# Patient Record
Sex: FEMALE | Race: WHITE | NOT HISPANIC OR LATINO
[De-identification: names, ages, dates, MRNs, and addresses within clinical notes are randomized per-mention and may not be internally consistent; named-entity substitution may affect disease eponyms.]

---

## 2018-06-22 ENCOUNTER — APPOINTMENT (OUTPATIENT)
Dept: ANESTHESIOLOGY | Facility: CLINIC | Age: 68
End: 2018-06-22

## 2018-06-22 ENCOUNTER — OUTPATIENT (OUTPATIENT)
Dept: OUTPATIENT SERVICES | Facility: HOSPITAL | Age: 68
LOS: 1 days | End: 2018-06-22
Payer: MEDICARE

## 2018-06-22 DIAGNOSIS — M54.16 RADICULOPATHY, LUMBAR REGION: ICD-10-CM

## 2018-06-22 PROCEDURE — 64483 NJX AA&/STRD TFRM EPI L/S 1: CPT

## 2018-07-13 ENCOUNTER — OUTPATIENT (OUTPATIENT)
Dept: OUTPATIENT SERVICES | Facility: HOSPITAL | Age: 68
LOS: 1 days | End: 2018-07-13
Payer: MEDICARE

## 2018-07-13 ENCOUNTER — APPOINTMENT (OUTPATIENT)
Dept: ANESTHESIOLOGY | Facility: CLINIC | Age: 68
End: 2018-07-13

## 2018-07-13 ENCOUNTER — TRANSCRIPTION ENCOUNTER (OUTPATIENT)
Age: 68
End: 2018-07-13

## 2018-07-13 DIAGNOSIS — M54.16 RADICULOPATHY, LUMBAR REGION: ICD-10-CM

## 2018-07-13 PROCEDURE — 62323 NJX INTERLAMINAR LMBR/SAC: CPT

## 2020-12-05 ENCOUNTER — TRANSCRIPTION ENCOUNTER (OUTPATIENT)
Age: 70
End: 2020-12-05

## 2020-12-17 ENCOUNTER — TRANSCRIPTION ENCOUNTER (OUTPATIENT)
Age: 70
End: 2020-12-17

## 2020-12-21 ENCOUNTER — TRANSCRIPTION ENCOUNTER (OUTPATIENT)
Age: 70
End: 2020-12-21

## 2021-01-19 DIAGNOSIS — Z80.3 FAMILY HISTORY OF MALIGNANT NEOPLASM OF BREAST: ICD-10-CM

## 2021-01-19 DIAGNOSIS — E55.9 VITAMIN D DEFICIENCY, UNSPECIFIED: ICD-10-CM

## 2021-01-20 ENCOUNTER — NON-APPOINTMENT (OUTPATIENT)
Age: 71
End: 2021-01-20

## 2021-01-21 ENCOUNTER — APPOINTMENT (OUTPATIENT)
Dept: INTERNAL MEDICINE | Facility: CLINIC | Age: 71
End: 2021-01-21
Payer: MEDICARE

## 2021-01-21 ENCOUNTER — NON-APPOINTMENT (OUTPATIENT)
Age: 71
End: 2021-01-21

## 2021-01-21 VITALS — DIASTOLIC BLOOD PRESSURE: 40 MMHG | SYSTOLIC BLOOD PRESSURE: 82 MMHG

## 2021-01-21 VITALS
BODY MASS INDEX: 22.2 KG/M2 | TEMPERATURE: 96.6 F | HEIGHT: 64 IN | HEART RATE: 77 BPM | WEIGHT: 130 LBS | OXYGEN SATURATION: 96 % | DIASTOLIC BLOOD PRESSURE: 64 MMHG | SYSTOLIC BLOOD PRESSURE: 112 MMHG

## 2021-01-21 PROCEDURE — 93000 ELECTROCARDIOGRAM COMPLETE: CPT

## 2021-01-21 PROCEDURE — G0439: CPT

## 2021-01-21 PROCEDURE — 36415 COLL VENOUS BLD VENIPUNCTURE: CPT

## 2021-01-21 RX ORDER — ROPINIROLE 0.25 MG/1
0.25 TABLET, FILM COATED ORAL
Refills: 0 | Status: ACTIVE | COMMUNITY

## 2021-01-21 NOTE — PHYSICAL EXAM
[No Acute Distress] : no acute distress [Well Nourished] : well nourished [Well Developed] : well developed [Well-Appearing] : well-appearing [Normal Sclera/Conjunctiva] : normal sclera/conjunctiva [PERRL] : pupils equal round and reactive to light [EOMI] : extraocular movements intact [Normal Outer Ear/Nose] : the outer ears and nose were normal in appearance [Normal Oropharynx] : the oropharynx was normal [No JVD] : no jugular venous distention [No Lymphadenopathy] : no lymphadenopathy [Supple] : supple [Thyroid Normal, No Nodules] : the thyroid was normal and there were no nodules present [No Respiratory Distress] : no respiratory distress  [No Accessory Muscle Use] : no accessory muscle use [Clear to Auscultation] : lungs were clear to auscultation bilaterally [Normal Rate] : normal rate  [Regular Rhythm] : with a regular rhythm [Normal S1, S2] : normal S1 and S2 [No Murmur] : no murmur heard [No Carotid Bruits] : no carotid bruits [No Varicosities] : no varicosities [No Abdominal Bruit] : a ~M bruit was not heard ~T in the abdomen [Pedal Pulses Present] : the pedal pulses are present [No Edema] : there was no peripheral edema [No Palpable Aorta] : no palpable aorta [No Extremity Clubbing/Cyanosis] : no extremity clubbing/cyanosis [Normal Appearance] : normal in appearance [No Nipple Discharge] : no nipple discharge [No Axillary Lymphadenopathy] : no axillary lymphadenopathy [Soft] : abdomen soft [Non Tender] : non-tender [Non-distended] : non-distended [No Masses] : no abdominal mass palpated [No HSM] : no HSM [Normal Bowel Sounds] : normal bowel sounds [No Stool to Guaiac] : no stool to guaiac [No Mass] : no mass [Normal Sphincter Tone] : normal sphincter tone [Normal Posterior Cervical Nodes] : no posterior cervical lymphadenopathy [Normal Anterior Cervical Nodes] : no anterior cervical lymphadenopathy [No CVA Tenderness] : no CVA  tenderness [No Spinal Tenderness] : no spinal tenderness [No Joint Swelling] : no joint swelling [Grossly Normal Strength/Tone] : grossly normal strength/tone [No Rash] : no rash [Coordination Grossly Intact] : coordination grossly intact [No Focal Deficits] : no focal deficits [Normal Gait] : normal gait [Deep Tendon Reflexes (DTR)] : deep tendon reflexes were 2+ and symmetric [Normal Affect] : the affect was normal [Normal Insight/Judgement] : insight and judgment were intact [Stool Occult Blood] : stool negative for occult blood

## 2021-01-21 NOTE — ASSESSMENT
[FreeTextEntry1] : hold FLU and shingrx in hopes of immunization for Covid 19\par All preventative measures were reviewed with the patient and the patient is due for and agrees to the following as outlined  in the plan  below.\par  75.12

## 2021-01-21 NOTE — HEALTH RISK ASSESSMENT
[No] : In the past 12 months have you used drugs other than those required for medical reasons? No [0] : 2) Feeling down, depressed, or hopeless: Not at all (0) [Smoke Detector] : smoke detector [Carbon Monoxide Detector] : carbon monoxide detector [Seat Belt] :  uses seat belt [Sunscreen] : uses sunscreen [30 or more] : 30 or more [No falls in past year] : Patient reported no falls in the past year [None] : None [With Significant Other] : lives with significant other [] :  [Fully functional (bathing, dressing, toileting, transferring, walking, feeding)] : Fully functional (bathing, dressing, toileting, transferring, walking, feeding) [Fully functional (using the telephone, shopping, preparing meals, housekeeping, doing laundry, using] : Fully functional and needs no help or supervision to perform IADLs (using the telephone, shopping, preparing meals, housekeeping, doing laundry, using transportation, managing medications and managing finances) [Reports changes in hearing] : Reports changes in hearing [With Patient/Caregiver] : With Patient/Caregiver [] : No [YearQuit] : 1990 [de-identified] : not regular [de-identified] : reg [GQX5Ovnaq] : 0 [EyeExamDate] : 03/2020 [Change in mental status noted] : No change in mental status noted [Guns at Home] : no guns at home [MammogramDate] : 07/2020 [PapSmearDate] : 0 [BoneDensityDate] : 07/2020 [AdvancecareDate] : 1/21/21

## 2021-01-25 ENCOUNTER — TRANSCRIPTION ENCOUNTER (OUTPATIENT)
Age: 71
End: 2021-01-25

## 2021-01-25 LAB
25(OH)D3 SERPL-MCNC: 34.1 NG/ML
ALBUMIN SERPL ELPH-MCNC: 4.2 G/DL
ALP BLD-CCNC: 153 U/L
ALT SERPL-CCNC: 14 U/L
ANION GAP SERPL CALC-SCNC: 15 MMOL/L
APPEARANCE: CLEAR
AST SERPL-CCNC: 18 U/L
BACTERIA: NEGATIVE
BASOPHILS # BLD AUTO: 0.05 K/UL
BASOPHILS NFR BLD AUTO: 1.1 %
BILIRUB SERPL-MCNC: 0.4 MG/DL
BILIRUBIN URINE: NEGATIVE
BLOOD URINE: NEGATIVE
BUN SERPL-MCNC: 15 MG/DL
CALCIUM SERPL-MCNC: 9.6 MG/DL
CHLORIDE SERPL-SCNC: 105 MMOL/L
CHOLEST SERPL-MCNC: 185 MG/DL
CO2 SERPL-SCNC: 23 MMOL/L
COLOR: YELLOW
CREAT SERPL-MCNC: 0.95 MG/DL
EOSINOPHIL # BLD AUTO: 0.26 K/UL
EOSINOPHIL NFR BLD AUTO: 5.5 %
GLUCOSE QUALITATIVE U: NEGATIVE
GLUCOSE SERPL-MCNC: 89 MG/DL
HCT VFR BLD CALC: 42.7 %
HDLC SERPL-MCNC: 45 MG/DL
HGB BLD-MCNC: 13.7 G/DL
HYALINE CASTS: 0 /LPF
IMM GRANULOCYTES NFR BLD AUTO: 0.2 %
KETONES URINE: NEGATIVE
LDLC SERPL CALC-MCNC: 123 MG/DL
LEUKOCYTE ESTERASE URINE: NEGATIVE
LYMPHOCYTES # BLD AUTO: 1.95 K/UL
LYMPHOCYTES NFR BLD AUTO: 41.2 %
MAN DIFF?: NORMAL
MCHC RBC-ENTMCNC: 30.9 PG
MCHC RBC-ENTMCNC: 32.1 GM/DL
MCV RBC AUTO: 96.4 FL
MICROSCOPIC-UA: NORMAL
MONOCYTES # BLD AUTO: 0.55 K/UL
MONOCYTES NFR BLD AUTO: 11.6 %
NEUTROPHILS # BLD AUTO: 1.91 K/UL
NEUTROPHILS NFR BLD AUTO: 40.4 %
NITRITE URINE: NEGATIVE
NONHDLC SERPL-MCNC: 140 MG/DL
PH URINE: 6
PLATELET # BLD AUTO: 250 K/UL
POTASSIUM SERPL-SCNC: 5.5 MMOL/L
PROT SERPL-MCNC: 6.4 G/DL
PROTEIN URINE: NEGATIVE
RBC # BLD: 4.43 M/UL
RBC # FLD: 13.4 %
RED BLOOD CELLS URINE: 2 /HPF
SODIUM SERPL-SCNC: 143 MMOL/L
SPECIFIC GRAVITY URINE: 1.02
SQUAMOUS EPITHELIAL CELLS: 0 /HPF
T4 SERPL-MCNC: 5.3 UG/DL
TRIGL SERPL-MCNC: 83 MG/DL
TSH SERPL-ACNC: 3.39 UIU/ML
UROBILINOGEN URINE: NORMAL
WBC # FLD AUTO: 4.73 K/UL
WHITE BLOOD CELLS URINE: 1 /HPF

## 2021-03-31 ENCOUNTER — RX RENEWAL (OUTPATIENT)
Age: 71
End: 2021-03-31

## 2021-04-12 ENCOUNTER — TRANSCRIPTION ENCOUNTER (OUTPATIENT)
Age: 71
End: 2021-04-12

## 2021-04-13 ENCOUNTER — TRANSCRIPTION ENCOUNTER (OUTPATIENT)
Age: 71
End: 2021-04-13

## 2021-07-13 ENCOUNTER — TRANSCRIPTION ENCOUNTER (OUTPATIENT)
Age: 71
End: 2021-07-13

## 2021-08-12 ENCOUNTER — NON-APPOINTMENT (OUTPATIENT)
Age: 71
End: 2021-08-12

## 2021-08-12 ENCOUNTER — TRANSCRIPTION ENCOUNTER (OUTPATIENT)
Age: 71
End: 2021-08-12

## 2021-10-07 ENCOUNTER — RX RENEWAL (OUTPATIENT)
Age: 71
End: 2021-10-07

## 2021-10-26 ENCOUNTER — TRANSCRIPTION ENCOUNTER (OUTPATIENT)
Age: 71
End: 2021-10-26

## 2021-10-28 ENCOUNTER — TRANSCRIPTION ENCOUNTER (OUTPATIENT)
Age: 71
End: 2021-10-28

## 2021-12-01 ENCOUNTER — TRANSCRIPTION ENCOUNTER (OUTPATIENT)
Age: 71
End: 2021-12-01

## 2021-12-09 ENCOUNTER — APPOINTMENT (OUTPATIENT)
Dept: PULMONOLOGY | Facility: CLINIC | Age: 71
End: 2021-12-09
Payer: MEDICARE

## 2021-12-09 ENCOUNTER — APPOINTMENT (OUTPATIENT)
Dept: INTERNAL MEDICINE | Facility: CLINIC | Age: 71
End: 2021-12-09
Payer: MEDICARE

## 2021-12-09 VITALS
DIASTOLIC BLOOD PRESSURE: 62 MMHG | BODY MASS INDEX: 21.34 KG/M2 | SYSTOLIC BLOOD PRESSURE: 104 MMHG | OXYGEN SATURATION: 95 % | HEART RATE: 94 BPM | RESPIRATION RATE: 18 BRPM | HEIGHT: 64 IN | WEIGHT: 125 LBS | TEMPERATURE: 98 F

## 2021-12-09 DIAGNOSIS — Z80.1 FAMILY HISTORY OF MALIGNANT NEOPLASM OF TRACHEA, BRONCHUS AND LUNG: ICD-10-CM

## 2021-12-09 LAB — SARS-COV-2 N GENE NPH QL NAA+PROBE: NOT DETECTED

## 2021-12-09 PROCEDURE — 94727 GAS DIL/WSHOT DETER LNG VOL: CPT

## 2021-12-09 PROCEDURE — 99204 OFFICE O/P NEW MOD 45 MIN: CPT | Mod: 25

## 2021-12-09 PROCEDURE — ZZZZZ: CPT

## 2021-12-09 PROCEDURE — 94729 DIFFUSING CAPACITY: CPT

## 2021-12-09 PROCEDURE — 94010 BREATHING CAPACITY TEST: CPT

## 2021-12-09 RX ORDER — ALBUTEROL SULFATE 90 UG/1
108 (90 BASE) INHALANT RESPIRATORY (INHALATION)
Qty: 2 | Refills: 3 | Status: ACTIVE | COMMUNITY
Start: 2021-12-09 | End: 1900-01-01

## 2021-12-10 NOTE — ASSESSMENT
[FreeTextEntry1] : 71 year-old woman presents for shortness of breath.\par --She has severe emphysema and obstructive lung defect c/w COPD.\par \par Recommendations:\par --Start Anoro Ellipta Daily \par --Albuterol as needed\par --Advised to get influenza vaccination\par --Will refer to cardiology to insure no underlying CAD\par --She will likely need repeat CT chest - I discussed with radiology - she has compressive atelectasis in RML which is slightly larger than previos scans. We can consider repeat CT chest at reasonable time 3-6 months to insure no growth. \par \par RTC in 2 months

## 2021-12-10 NOTE — PROCEDURE
[FreeTextEntry1] : CT Chest NON Contrast\par 10/26/2021\par \par Impression:\par 1. Marked emphysema\par 2. Linear parenchymal markings in the medial segment of the RML\par 3. Scattered areas of linear scarring appear unchanged\par \par

## 2021-12-10 NOTE — HISTORY OF PRESENT ILLNESS
[TextBox_4] : 71 year-old woman presents for shorntess of breath.\par \par She describes intermittent shortness of breath, mostly with exertion. \par This has been present for a few years. \par She believes it is slowly getting worse. \par She has no associated symptoms, no chest tightness, no cough or wheezing. \par She has no nocturnal symptoms. \par No preceding events \par No relieving symptoms. \par No previous history of lung disorders. \par \par Former smoker\par quit >20 years\par Started age 16, 1.5ppd \par \par

## 2022-01-05 ENCOUNTER — RX RENEWAL (OUTPATIENT)
Age: 72
End: 2022-01-05

## 2022-01-17 ENCOUNTER — TRANSCRIPTION ENCOUNTER (OUTPATIENT)
Age: 72
End: 2022-01-17

## 2022-01-26 ENCOUNTER — NON-APPOINTMENT (OUTPATIENT)
Age: 72
End: 2022-01-26

## 2022-01-26 ENCOUNTER — APPOINTMENT (OUTPATIENT)
Dept: INTERNAL MEDICINE | Facility: CLINIC | Age: 72
End: 2022-01-26
Payer: MEDICARE

## 2022-01-26 VITALS
DIASTOLIC BLOOD PRESSURE: 56 MMHG | SYSTOLIC BLOOD PRESSURE: 100 MMHG | WEIGHT: 131 LBS | HEIGHT: 64 IN | BODY MASS INDEX: 22.36 KG/M2 | OXYGEN SATURATION: 97 % | TEMPERATURE: 98.6 F | HEART RATE: 77 BPM

## 2022-01-26 VITALS — DIASTOLIC BLOOD PRESSURE: 50 MMHG | SYSTOLIC BLOOD PRESSURE: 90 MMHG

## 2022-01-26 PROCEDURE — 36415 COLL VENOUS BLD VENIPUNCTURE: CPT

## 2022-01-26 PROCEDURE — 93000 ELECTROCARDIOGRAM COMPLETE: CPT

## 2022-01-26 PROCEDURE — G0439: CPT

## 2022-01-26 NOTE — HEALTH RISK ASSESSMENT
[Former] : Former [20 or more] : 20 or more [No] : In the past 12 months have you used drugs other than those required for medical reasons? No [No falls in past year] : Patient reported no falls in the past year [0] : 2) Feeling down, depressed, or hopeless: Not at all (0) [PHQ-2 Negative - No further assessment needed] : PHQ-2 Negative - No further assessment needed [I have developed a follow-up plan documented below in the note.] : I have developed a follow-up plan documented below in the note. [Fully functional (bathing, dressing, toileting, transferring, walking, feeding)] : Fully functional (bathing, dressing, toileting, transferring, walking, feeding) [Fully functional (using the telephone, shopping, preparing meals, housekeeping, doing laundry, using] : Fully functional and needs no help or supervision to perform IADLs (using the telephone, shopping, preparing meals, housekeeping, doing laundry, using transportation, managing medications and managing finances) [Smoke Detector] : smoke detector [Carbon Monoxide Detector] : carbon monoxide detector [Seat Belt] :  uses seat belt [Sunscreen] : uses sunscreen [With Patient/Caregiver] : , with patient/caregiver [de-identified] : no [de-identified] : no [[id number]] : 0 [EyeExamDate] : 2021 [MammogramDate] : 07/2021 [PapSmearComments] : Total Hysterectomy  [BoneDensityDate] : 07/2020 [ColonoscopyDate] : 03/2006 [AdvancecareDate] : 1/26/22

## 2022-01-27 ENCOUNTER — TRANSCRIPTION ENCOUNTER (OUTPATIENT)
Age: 72
End: 2022-01-27

## 2022-01-27 LAB
25(OH)D3 SERPL-MCNC: 42.7 NG/ML
ALBUMIN SERPL ELPH-MCNC: 4.4 G/DL
ALP BLD-CCNC: 78 U/L
ALT SERPL-CCNC: 18 U/L
ANION GAP SERPL CALC-SCNC: 9 MMOL/L
APPEARANCE: CLEAR
AST SERPL-CCNC: 22 U/L
BACTERIA: NEGATIVE
BASOPHILS # BLD AUTO: 0.05 K/UL
BASOPHILS NFR BLD AUTO: 0.9 %
BILIRUB SERPL-MCNC: 0.5 MG/DL
BILIRUBIN URINE: NEGATIVE
BLOOD URINE: NEGATIVE
BUN SERPL-MCNC: 12 MG/DL
CALCIUM SERPL-MCNC: 9.6 MG/DL
CHLORIDE SERPL-SCNC: 106 MMOL/L
CHOLEST SERPL-MCNC: 147 MG/DL
CO2 SERPL-SCNC: 27 MMOL/L
COLOR: NORMAL
CREAT SERPL-MCNC: 0.77 MG/DL
EOSINOPHIL # BLD AUTO: 0.21 K/UL
EOSINOPHIL NFR BLD AUTO: 3.7 %
GLUCOSE QUALITATIVE U: NEGATIVE
GLUCOSE SERPL-MCNC: 97 MG/DL
HCT VFR BLD CALC: 43.1 %
HCV AB SER QL: NONREACTIVE
HCV S/CO RATIO: 0.08 S/CO
HDLC SERPL-MCNC: 46 MG/DL
HGB BLD-MCNC: 13.6 G/DL
HYALINE CASTS: 0 /LPF
IMM GRANULOCYTES NFR BLD AUTO: 0.4 %
KETONES URINE: NEGATIVE
LDLC SERPL CALC-MCNC: 82 MG/DL
LEUKOCYTE ESTERASE URINE: NEGATIVE
LYMPHOCYTES # BLD AUTO: 1.84 K/UL
LYMPHOCYTES NFR BLD AUTO: 32.3 %
MAN DIFF?: NORMAL
MCHC RBC-ENTMCNC: 30.9 PG
MCHC RBC-ENTMCNC: 31.6 GM/DL
MCV RBC AUTO: 98 FL
MICROSCOPIC-UA: NORMAL
MONOCYTES # BLD AUTO: 0.63 K/UL
MONOCYTES NFR BLD AUTO: 11.1 %
NEUTROPHILS # BLD AUTO: 2.94 K/UL
NEUTROPHILS NFR BLD AUTO: 51.6 %
NITRITE URINE: NEGATIVE
NONHDLC SERPL-MCNC: 100 MG/DL
PH URINE: 6
PLATELET # BLD AUTO: 282 K/UL
POTASSIUM SERPL-SCNC: 5.2 MMOL/L
PROT SERPL-MCNC: 6.1 G/DL
PROTEIN URINE: NEGATIVE
RBC # BLD: 4.4 M/UL
RBC # FLD: 14.3 %
RED BLOOD CELLS URINE: 2 /HPF
SODIUM SERPL-SCNC: 142 MMOL/L
SPECIFIC GRAVITY URINE: 1.01
SQUAMOUS EPITHELIAL CELLS: 0 /HPF
T4 SERPL-MCNC: 5.6 UG/DL
TRIGL SERPL-MCNC: 95 MG/DL
TSH SERPL-ACNC: 3.14 UIU/ML
UROBILINOGEN URINE: NORMAL
WBC # FLD AUTO: 5.69 K/UL
WHITE BLOOD CELLS URINE: 0 /HPF

## 2022-01-28 ENCOUNTER — RX RENEWAL (OUTPATIENT)
Age: 72
End: 2022-01-28

## 2022-02-09 ENCOUNTER — APPOINTMENT (OUTPATIENT)
Dept: PULMONOLOGY | Facility: CLINIC | Age: 72
End: 2022-02-09
Payer: MEDICARE

## 2022-02-09 VITALS
BODY MASS INDEX: 21.85 KG/M2 | WEIGHT: 128 LBS | DIASTOLIC BLOOD PRESSURE: 64 MMHG | RESPIRATION RATE: 16 BRPM | SYSTOLIC BLOOD PRESSURE: 98 MMHG | HEIGHT: 64 IN | HEART RATE: 78 BPM | TEMPERATURE: 97.9 F | OXYGEN SATURATION: 95 %

## 2022-02-09 PROCEDURE — 99214 OFFICE O/P EST MOD 30 MIN: CPT

## 2022-02-09 NOTE — ASSESSMENT
[FreeTextEntry1] : 71 year-old woman presents for shortness of breath.\par --She has severe emphysema and obstructive lung defect c/w COPD.\par \par Recommendations:\par --c/w Anoro Ellipta Daily . Given 90 day supply. \par --Albuterol as needed - has not used. \par --Obtain repeat CT chest. At her last visit I discussed with radiology - she has compressive atelectasis in RML which is slightly larger than previous scans. Recommended repeat CT chest at  3-6 months to insure no growth. \par RTC in 3 weeks to review CT chest

## 2022-02-09 NOTE — HISTORY OF PRESENT ILLNESS
[TextBox_4] : 71 year-old woman presents for shortness of breath.\par \par \par Former smoker\par quit >20 years\par Started age 16, 1.5ppd \par \par At her last visit 12/9/2021 she was started on Anoro Ellipta for COPD. \par Presents today for follow up. \par \par She describes intermittent shortness of breath, mostly with exertion. \par This has been present for a few years. \par She believes it is slowly getting worse. \par Since starting Anoro Ellipta she has some mild improvement. \par She has no associated symptoms, no chest tightness, no cough or wheezing. \par She has no nocturnal symptoms. \par Has chronic back pain - follows with a pain specialist. \par \par \par

## 2022-03-02 ENCOUNTER — APPOINTMENT (OUTPATIENT)
Dept: PULMONOLOGY | Facility: CLINIC | Age: 72
End: 2022-03-02
Payer: MEDICARE

## 2022-03-02 VITALS
OXYGEN SATURATION: 95 % | RESPIRATION RATE: 16 BRPM | HEART RATE: 75 BPM | DIASTOLIC BLOOD PRESSURE: 54 MMHG | SYSTOLIC BLOOD PRESSURE: 96 MMHG | WEIGHT: 128 LBS | BODY MASS INDEX: 21.85 KG/M2 | TEMPERATURE: 97.8 F | HEIGHT: 64 IN

## 2022-03-02 PROCEDURE — 99213 OFFICE O/P EST LOW 20 MIN: CPT

## 2022-03-03 ENCOUNTER — TRANSCRIPTION ENCOUNTER (OUTPATIENT)
Age: 72
End: 2022-03-03

## 2022-03-03 NOTE — HISTORY OF PRESENT ILLNESS
[TextBox_4] : 71 year-old woman presents for follow up of shortness of breath.\par \par Former smoker\par quit >20 years\par Started age 16, 1.5ppd \par \par At her initial visit 12/9/2021 she was started on Anoro Ellipta for COPD. She has no felt significant improvement in her breathing  using this medication. She still has occasional shortness of breath. . \par No cough or wheezing. \par She has no nocturnal symptoms. \par Has chronic back pain - follows with a pain specialist. \par \par At her last visit, CT chest report done at [facility] was reviewed which noted a linear parenchymal marking in the medial segment of the RML, slightly greater in extendt compared to prior study. She has since underwent repeat CT chest at [facility] 2/16/22. Repeat CT chest showed stable mild scarring atelectasis in RML. \par \par \par

## 2022-03-03 NOTE — ASSESSMENT
[FreeTextEntry1] : 71 year-old woman presents for shortness of breath.\par --She has severe emphysema and moderate obstructive lung defect on PFT c/w COPD. Has not had significant relief by inhaler regimen with Anoro Ellipta.She has albuterol which she has not used. \par --At her last visit, CT chest report done at [facility] 10/2021 was reviewed which noted a linear parenchymal marking in the medial segment of the RML, slightly greater in extent compared to prior study. She was therefor sent for repeat CT chest which she had done 2/16/22. Report and imaging reviewed, she has stable scarring in RML, this is very mild, and has been present since 2019. \par \par \par Recommendations:\par --Can stop Anoro Ellipta and monitor symptoms off therapy. \par --Use albuterol as needed - both when she has symptoms and prophylactically\par --Regarding RML scarring is mild, and present since 2019 no additional follow up for this lesion is needed.\par --Should continue to follow with PMD. Recommended cardiac eval if dyspnea persists or worsenings. She would like ot hold off for now and continue to follow with PMD.\par --She would like to follow with pulmonologist - will refer to Dr. Mata in my absence.

## 2022-04-08 ENCOUNTER — NON-APPOINTMENT (OUTPATIENT)
Age: 72
End: 2022-04-08

## 2022-04-08 ENCOUNTER — TRANSCRIPTION ENCOUNTER (OUTPATIENT)
Age: 72
End: 2022-04-08

## 2022-05-27 ENCOUNTER — TRANSCRIPTION ENCOUNTER (OUTPATIENT)
Age: 72
End: 2022-05-27

## 2022-06-06 ENCOUNTER — TRANSCRIPTION ENCOUNTER (OUTPATIENT)
Age: 72
End: 2022-06-06

## 2022-06-06 ENCOUNTER — RX RENEWAL (OUTPATIENT)
Age: 72
End: 2022-06-06

## 2022-07-07 ENCOUNTER — NON-APPOINTMENT (OUTPATIENT)
Age: 72
End: 2022-07-07

## 2022-07-07 ENCOUNTER — APPOINTMENT (OUTPATIENT)
Dept: INTERNAL MEDICINE | Facility: CLINIC | Age: 72
End: 2022-07-07

## 2022-07-07 VITALS
RESPIRATION RATE: 16 BRPM | WEIGHT: 129 LBS | DIASTOLIC BLOOD PRESSURE: 62 MMHG | HEART RATE: 78 BPM | BODY MASS INDEX: 22.02 KG/M2 | TEMPERATURE: 98.7 F | OXYGEN SATURATION: 96 % | HEIGHT: 64 IN | SYSTOLIC BLOOD PRESSURE: 108 MMHG

## 2022-07-07 PROCEDURE — 99214 OFFICE O/P EST MOD 30 MIN: CPT | Mod: 25

## 2022-07-07 PROCEDURE — 94060 EVALUATION OF WHEEZING: CPT

## 2022-07-07 RX ORDER — GABAPENTIN 300 MG/1
300 CAPSULE ORAL
Qty: 270 | Refills: 0 | Status: ACTIVE | COMMUNITY
Start: 2022-06-06

## 2022-07-07 RX ORDER — TRAMADOL HYDROCHLORIDE 50 MG/1
50 TABLET, COATED ORAL
Qty: 90 | Refills: 0 | Status: ACTIVE | COMMUNITY
Start: 2022-03-28

## 2022-07-07 NOTE — DISCUSSION/SUMMARY
[FreeTextEntry1] : Ms. Pugh is a 72-year-old female who presents for a pulmonary followup. She has moderate COPD as indicated by previous pulmonary function tests and current spirometry. She will restart Anoro Ellipta 62.5-25 one puff daily. Previous CT scan of the chest showed stable linear atelectasis. Patient has albuterol metered-dose inhaler for rescue therapy. She will not require oral steroids. Followup in 6 months.

## 2022-07-07 NOTE — HISTORY OF PRESENT ILLNESS
[TextBox_4] : Ms. Pugh presents for followup evaluation. She has history of COPD. She previously been under the care of Dr. Cohn. Patient had been started on Anoro Ellipta for her COPD, but, she discontinued it because she felt no significant improvement in her breathing. Patient does get short of breath with exertion. Activity such as going uphill, going up stairs or carrying heavy objects will close and shortness of breath. The symptoms do resolve with rest. She has no nocturnal symptoms of cough or dyspnea.

## 2022-07-07 NOTE — PROCEDURE
[FreeTextEntry1] : Spirometry pre-and postbronchodilator therapy shows moderate obstructive lung disease. FEV1 is 1.27 L which is 57% predicted. FVC is 2.44 L which is 83% predicted. FEV1/FVC ratio is 52%. There is significant bronchodilator response.

## 2022-08-23 ENCOUNTER — TRANSCRIPTION ENCOUNTER (OUTPATIENT)
Age: 72
End: 2022-08-23

## 2022-09-07 ENCOUNTER — RX RENEWAL (OUTPATIENT)
Age: 72
End: 2022-09-07

## 2022-09-29 ENCOUNTER — TRANSCRIPTION ENCOUNTER (OUTPATIENT)
Age: 72
End: 2022-09-29

## 2022-10-02 ENCOUNTER — RX RENEWAL (OUTPATIENT)
Age: 72
End: 2022-10-02

## 2022-10-26 ENCOUNTER — APPOINTMENT (OUTPATIENT)
Dept: INTERNAL MEDICINE | Facility: CLINIC | Age: 72
End: 2022-10-26

## 2022-10-26 VITALS
DIASTOLIC BLOOD PRESSURE: 62 MMHG | HEIGHT: 64 IN | TEMPERATURE: 98.1 F | OXYGEN SATURATION: 96 % | WEIGHT: 129 LBS | BODY MASS INDEX: 22.02 KG/M2 | HEART RATE: 87 BPM | SYSTOLIC BLOOD PRESSURE: 98 MMHG

## 2022-10-26 PROCEDURE — 99214 OFFICE O/P EST MOD 30 MIN: CPT | Mod: 25

## 2022-10-26 PROCEDURE — G0008: CPT

## 2022-10-26 PROCEDURE — 90662 IIV NO PRSV INCREASED AG IM: CPT

## 2022-10-26 RX ORDER — UMECLIDINIUM BROMIDE AND VILANTEROL TRIFENATATE 62.5; 25 UG/1; UG/1
62.5-25 POWDER RESPIRATORY (INHALATION)
Qty: 6 | Refills: 0 | Status: DISCONTINUED | COMMUNITY
Start: 2022-02-09 | End: 2022-10-26

## 2022-10-26 RX ORDER — GABAPENTIN 300 MG
300 TABLET ORAL 3 TIMES DAILY
Refills: 0 | Status: DISCONTINUED | COMMUNITY
End: 2022-10-26

## 2022-10-26 NOTE — PHYSICAL EXAM
[No Acute Distress] : no acute distress [Well Nourished] : well nourished [Well Developed] : well developed [No Respiratory Distress] : no respiratory distress  [No Accessory Muscle Use] : no accessory muscle use [Clear to Auscultation] : lungs were clear to auscultation bilaterally [Normal Rate] : normal rate  [Regular Rhythm] : with a regular rhythm [Normal S1, S2] : normal S1 and S2 [Pedal Pulses Present] : the pedal pulses are present [No Extremity Clubbing/Cyanosis] : no extremity clubbing/cyanosis [Coordination Grossly Intact] : coordination grossly intact [No Focal Deficits] : no focal deficits [Normal Gait] : normal gait [Normal Affect] : the affect was normal [Alert and Oriented x3] : oriented to person, place, and time [Normal Insight/Judgement] : insight and judgment were intact [de-identified] : left arm tremor noted when outstretched

## 2022-10-26 NOTE — HISTORY OF PRESENT ILLNESS
[FreeTextEntry1] : f/up  [de-identified] : Pt is a 72 yr. old female with a h/o of benign essential tremors, COPD, HLD, thyroid nodule. She is here for followup. She is requesting renewal of Ambien 10 m gpo daily PRN . . She takes it occasionally for insomnia .\par She continues to see  neurology Dr. Reddy  for h/ of essential tremors of her left arm. She takes Primidone with little relief of sx's.   \par Pt has appt on Friday with Dr. Gardiner for followup on her thyroid nodule. \par She is requesting a flu shot today. \par Denies fever, chills, SOB, chest pain, palpitations.

## 2022-10-26 NOTE — ASSESSMENT
[FreeTextEntry1] : 1. insomnia \par Ambien renewed, I stop reviewed prior \par potential for sedation discussed with pt \par 2. essential tremors\par F/up with neuro MD \par 3. thyroid nodule \par Has appt Friday with endocrinologist \par 4. encounter for immunization \par High dose flu vax given \par CPE scheduled 2/7/22 Dr. Lopez \par

## 2022-11-01 ENCOUNTER — TRANSCRIPTION ENCOUNTER (OUTPATIENT)
Age: 72
End: 2022-11-01

## 2022-11-06 ENCOUNTER — RX RENEWAL (OUTPATIENT)
Age: 72
End: 2022-11-06

## 2022-12-17 ENCOUNTER — INPATIENT (INPATIENT)
Facility: HOSPITAL | Age: 72
LOS: 7 days | Discharge: HOME HEALTH | DRG: 177 | End: 2022-12-25
Attending: STUDENT IN AN ORGANIZED HEALTH CARE EDUCATION/TRAINING PROGRAM | Admitting: INTERNAL MEDICINE
Payer: MEDICARE

## 2022-12-17 VITALS — HEIGHT: 64 IN | WEIGHT: 126.1 LBS

## 2022-12-17 DIAGNOSIS — U07.1 COVID-19: ICD-10-CM

## 2022-12-17 LAB
ALBUMIN SERPL ELPH-MCNC: 3.3 G/DL — SIGNIFICANT CHANGE UP (ref 3.3–5)
ALP SERPL-CCNC: 61 U/L — SIGNIFICANT CHANGE UP (ref 40–120)
ALT FLD-CCNC: 24 U/L — SIGNIFICANT CHANGE UP (ref 12–78)
ANION GAP SERPL CALC-SCNC: 6 MMOL/L — SIGNIFICANT CHANGE UP (ref 5–17)
APPEARANCE CSF: CLEAR — SIGNIFICANT CHANGE UP
AST SERPL-CCNC: 31 U/L — SIGNIFICANT CHANGE UP (ref 15–37)
BASOPHILS # BLD AUTO: 0.01 K/UL — SIGNIFICANT CHANGE UP (ref 0–0.2)
BASOPHILS NFR BLD AUTO: 0.3 % — SIGNIFICANT CHANGE UP (ref 0–2)
BILIRUB SERPL-MCNC: 0.5 MG/DL — SIGNIFICANT CHANGE UP (ref 0.2–1.2)
BUN SERPL-MCNC: 14 MG/DL — SIGNIFICANT CHANGE UP (ref 7–23)
CALCIUM SERPL-MCNC: 8.3 MG/DL — LOW (ref 8.5–10.1)
CHLORIDE SERPL-SCNC: 99 MMOL/L — SIGNIFICANT CHANGE UP (ref 96–108)
CO2 SERPL-SCNC: 28 MMOL/L — SIGNIFICANT CHANGE UP (ref 22–31)
COLOR CSF: SIGNIFICANT CHANGE UP
CREAT SERPL-MCNC: 0.69 MG/DL — SIGNIFICANT CHANGE UP (ref 0.5–1.3)
EGFR: 93 ML/MIN/1.73M2 — SIGNIFICANT CHANGE UP
EOSINOPHIL # BLD AUTO: 0 K/UL — SIGNIFICANT CHANGE UP (ref 0–0.5)
EOSINOPHIL NFR BLD AUTO: 0 % — SIGNIFICANT CHANGE UP (ref 0–6)
FLUAV AG NPH QL: SIGNIFICANT CHANGE UP
FLUBV AG NPH QL: SIGNIFICANT CHANGE UP
GLUCOSE SERPL-MCNC: 91 MG/DL — SIGNIFICANT CHANGE UP (ref 70–99)
HCT VFR BLD CALC: 40.2 % — SIGNIFICANT CHANGE UP (ref 34.5–45)
HGB BLD-MCNC: 13.9 G/DL — SIGNIFICANT CHANGE UP (ref 11.5–15.5)
IMM GRANULOCYTES NFR BLD AUTO: 0.8 % — SIGNIFICANT CHANGE UP (ref 0–0.9)
LYMPHOCYTES # BLD AUTO: 0.92 K/UL — LOW (ref 1–3.3)
LYMPHOCYTES # BLD AUTO: 26 % — SIGNIFICANT CHANGE UP (ref 13–44)
MCHC RBC-ENTMCNC: 31 PG — SIGNIFICANT CHANGE UP (ref 27–34)
MCHC RBC-ENTMCNC: 34.6 GM/DL — SIGNIFICANT CHANGE UP (ref 32–36)
MCV RBC AUTO: 89.7 FL — SIGNIFICANT CHANGE UP (ref 80–100)
MONOCYTES # BLD AUTO: 0.66 K/UL — SIGNIFICANT CHANGE UP (ref 0–0.9)
MONOCYTES NFR BLD AUTO: 18.6 % — HIGH (ref 2–14)
NEUTROPHILS # BLD AUTO: 1.92 K/UL — SIGNIFICANT CHANGE UP (ref 1.8–7.4)
NEUTROPHILS NFR BLD AUTO: 54.3 % — SIGNIFICANT CHANGE UP (ref 43–77)
NRBC NFR CSF: <1 — SIGNIFICANT CHANGE UP (ref 0–5)
PLATELET # BLD AUTO: 173 K/UL — SIGNIFICANT CHANGE UP (ref 150–400)
POTASSIUM SERPL-MCNC: 3.7 MMOL/L — SIGNIFICANT CHANGE UP (ref 3.5–5.3)
POTASSIUM SERPL-SCNC: 3.7 MMOL/L — SIGNIFICANT CHANGE UP (ref 3.5–5.3)
PROT SERPL-MCNC: 6.6 GM/DL — SIGNIFICANT CHANGE UP (ref 6–8.3)
RBC # BLD: 4.48 M/UL — SIGNIFICANT CHANGE UP (ref 3.8–5.2)
RBC # CSF: 0 /UL — SIGNIFICANT CHANGE UP (ref 0–0)
RBC # FLD: 13.5 % — SIGNIFICANT CHANGE UP (ref 10.3–14.5)
RSV RNA NPH QL NAA+NON-PROBE: SIGNIFICANT CHANGE UP
SARS-COV-2 RNA SPEC QL NAA+PROBE: DETECTED
SODIUM SERPL-SCNC: 133 MMOL/L — LOW (ref 135–145)
TUBE TYPE: SIGNIFICANT CHANGE UP
WBC # BLD: 3.54 K/UL — LOW (ref 3.8–10.5)
WBC # FLD AUTO: 3.54 K/UL — LOW (ref 3.8–10.5)

## 2022-12-17 PROCEDURE — G1004: CPT

## 2022-12-17 PROCEDURE — 70551 MRI BRAIN STEM W/O DYE: CPT

## 2022-12-17 PROCEDURE — 71045 X-RAY EXAM CHEST 1 VIEW: CPT | Mod: 26

## 2022-12-17 PROCEDURE — 97535 SELF CARE MNGMENT TRAINING: CPT | Mod: GO

## 2022-12-17 PROCEDURE — 97112 NEUROMUSCULAR REEDUCATION: CPT | Mod: GO

## 2022-12-17 PROCEDURE — 97116 GAIT TRAINING THERAPY: CPT | Mod: GP

## 2022-12-17 PROCEDURE — 85027 COMPLETE CBC AUTOMATED: CPT

## 2022-12-17 PROCEDURE — 83615 LACTATE (LD) (LDH) ENZYME: CPT

## 2022-12-17 PROCEDURE — 82607 VITAMIN B-12: CPT

## 2022-12-17 PROCEDURE — 85379 FIBRIN DEGRADATION QUANT: CPT

## 2022-12-17 PROCEDURE — 97166 OT EVAL MOD COMPLEX 45 MIN: CPT | Mod: GO

## 2022-12-17 PROCEDURE — 70544 MR ANGIOGRAPHY HEAD W/O DYE: CPT

## 2022-12-17 PROCEDURE — 97110 THERAPEUTIC EXERCISES: CPT | Mod: GO

## 2022-12-17 PROCEDURE — 36415 COLL VENOUS BLD VENIPUNCTURE: CPT

## 2022-12-17 PROCEDURE — 93306 TTE W/DOPPLER COMPLETE: CPT

## 2022-12-17 PROCEDURE — 93010 ELECTROCARDIOGRAM REPORT: CPT

## 2022-12-17 PROCEDURE — 86140 C-REACTIVE PROTEIN: CPT

## 2022-12-17 PROCEDURE — 70450 CT HEAD/BRAIN W/O DYE: CPT | Mod: 26,MG

## 2022-12-17 PROCEDURE — 82728 ASSAY OF FERRITIN: CPT

## 2022-12-17 PROCEDURE — 72141 MRI NECK SPINE W/O DYE: CPT

## 2022-12-17 PROCEDURE — 97533 SENSORY INTEGRATION: CPT | Mod: GO

## 2022-12-17 PROCEDURE — 99285 EMERGENCY DEPT VISIT HI MDM: CPT | Mod: CS,25

## 2022-12-17 PROCEDURE — 80053 COMPREHEN METABOLIC PANEL: CPT

## 2022-12-17 PROCEDURE — 97162 PT EVAL MOD COMPLEX 30 MIN: CPT | Mod: GP

## 2022-12-17 PROCEDURE — 80061 LIPID PANEL: CPT

## 2022-12-17 PROCEDURE — 80076 HEPATIC FUNCTION PANEL: CPT

## 2022-12-17 PROCEDURE — 80048 BASIC METABOLIC PNL TOTAL CA: CPT

## 2022-12-17 PROCEDURE — 86803 HEPATITIS C AB TEST: CPT

## 2022-12-17 PROCEDURE — 70547 MR ANGIOGRAPHY NECK W/O DYE: CPT

## 2022-12-17 PROCEDURE — 82248 BILIRUBIN DIRECT: CPT

## 2022-12-17 PROCEDURE — 82565 ASSAY OF CREATININE: CPT

## 2022-12-17 PROCEDURE — 94640 AIRWAY INHALATION TREATMENT: CPT

## 2022-12-17 PROCEDURE — 85652 RBC SED RATE AUTOMATED: CPT

## 2022-12-17 PROCEDURE — 97530 THERAPEUTIC ACTIVITIES: CPT | Mod: GP

## 2022-12-17 PROCEDURE — 92610 EVALUATE SWALLOWING FUNCTION: CPT | Mod: GN

## 2022-12-17 PROCEDURE — 62270 DX LMBR SPI PNXR: CPT

## 2022-12-17 PROCEDURE — 82746 ASSAY OF FOLIC ACID SERUM: CPT

## 2022-12-17 RX ORDER — ALBUTEROL 90 UG/1
1 AEROSOL, METERED ORAL ONCE
Refills: 0 | Status: COMPLETED | OUTPATIENT
Start: 2022-12-17 | End: 2022-12-17

## 2022-12-17 RX ORDER — NIRMATRELVIR AND RITONAVIR 150-100 MG
3 KIT ORAL
Qty: 30 | Refills: 0
Start: 2022-12-17 | End: 2022-12-21

## 2022-12-17 RX ORDER — IPRATROPIUM/ALBUTEROL SULFATE 18-103MCG
3 AEROSOL WITH ADAPTER (GRAM) INHALATION ONCE
Refills: 0 | Status: COMPLETED | OUTPATIENT
Start: 2022-12-17 | End: 2022-12-17

## 2022-12-17 RX ORDER — SODIUM CHLORIDE 9 MG/ML
1000 INJECTION INTRAMUSCULAR; INTRAVENOUS; SUBCUTANEOUS ONCE
Refills: 0 | Status: COMPLETED | OUTPATIENT
Start: 2022-12-17 | End: 2022-12-17

## 2022-12-17 RX ADMIN — Medication 3 MILLILITER(S): at 20:12

## 2022-12-17 RX ADMIN — SODIUM CHLORIDE 2000 MILLILITER(S): 9 INJECTION INTRAMUSCULAR; INTRAVENOUS; SUBCUTANEOUS at 20:12

## 2022-12-17 NOTE — ED ADULT TRIAGE NOTE - CHIEF COMPLAINT QUOTE
pt bibems from home for b/l lower extremities weakness, "I can't walk, feels llike jello."  pt also reports flu like symptoms 2 nights ago; coughing, difficulty breathing.

## 2022-12-17 NOTE — ED ADULT NURSE NOTE - NSIMPLEMENTINTERV_GEN_ALL_ED
Implemented All Universal Safety Interventions:  [place] to call system. Call bell, personal items and telephone within reach. Instruct patient to call for assistance. Room bathroom lighting operational. Non-slip footwear when patient is off stretcher. Physically safe environment: no spills, clutter or unnecessary equipment. Stretcher in lowest position, wheels locked, appropriate side rails in place.

## 2022-12-17 NOTE — ED PROVIDER NOTE - NS ED ROS FT
Patient made aware of Bone Density Test results and that a referral has been placed to Endocrine for further evaluation and treatment options for her condition. Patient verbalized understanding of all of the above. Patient has no further questions at this time.    Gen: No fever, normal appetite, + weakness   Eyes: No eye irritation or discharge  ENT: No ear pain, congestion, sore throat  Resp: + cough and trouble breathing  Cardiovascular: No chest pain or palpitation  Gastroenteric: No nausea/vomiting, diarrhea, constipation  :  No change in urine output; no dysuria  MS: No joint or muscle pain  Skin: No rashes  Neuro: No headache; no abnormal movements  Remainder negative, except as per the HPI

## 2022-12-17 NOTE — ED ADULT NURSE NOTE - OBJECTIVE STATEMENT
Pt presents A&OX4 c/o generalized weakness and flu like symptoms X 2 days. PT reports legs feel like they are going to give out, denies falling. PT denies subjective fever, nvd. PT skin color appropriate for race, respirations even and unlabored. ED workup in progress, medicated as per orders. Will continue to monitor. Michael Hartley RN

## 2022-12-17 NOTE — ED PROVIDER NOTE - PROGRESS NOTE DETAILS
Yang Dsouza for Dr. Young     On ambulation trial pt unable to stand secondary to leg weakness. Pt lacks the ability to flex her left foot. Denies bowel or bladder incontinence. Denies any history of injection drug use. Endorses her Flu shot was 2-3 months ago. Pt notes associated tingling in finger tips and on her face. Given left foot drop, concern for Guillain-Barré Syndrome. Will consult Neuro. Yang Dsouza for Dr. Young     On ambulation trial pt unable to stand secondary to leg weakness.  + foot drop on left, 4/5 strength bilaterally.  Denies bowel or bladder incontinence. no saddle anesthesia. Denies any history of injection drug use. Endorses her Flu shot was 2-3 months ago. Pt notes associated tingling in finger tips and on her face. given ascending weakness, concern for Guillain-Barré Syndrome. Will consult Neuro. Suman Young MD : signed out to Dr. Maldonado pending CT head Yang Dsouza for Dr. Young     On ambulation trial pt unable to stand secondary to leg weakness.  + foot drop on left, 4/5 strength bilaterally.  Denies bowel or bladder incontinence. no saddle anesthesia. Denies any history of injection drug use. Endorses her Flu shot was 2-3 months ago. Pt notes associated tingling in finger tips bilaterally and on her face. given ascending weakness, concern for Guillain-Barré Syndrome. Will consult Neuro. Yang Dsouza for Dr. Young     On ambulation trial pt unable to stand secondary to leg weakness.  + foot drop on left, 4/5 strength bilaterally.  Denies bowel or bladder incontinence. no saddle anesthesia. Denies any history of injection drug use. Endorses her Flu shot was 2-3 months ago. Pt notes associated tingling in finger tips bilaterally and on her face. given ascending weakness, concern for Guillain-Barré Syndrome, also possible CVA however no clear territory w/ bilateral symptoms and unclear onset. Will consult Neuro. Suman Young MD : signed out to Dr. Maldonado pending CT head, neuro aware

## 2022-12-17 NOTE — ED PROVIDER NOTE - CLINICAL SUMMARY MEDICAL DECISION MAKING FREE TEXT BOX
Pt with hx COPD presenting with viral syndrome and COPD exacerbation. Will give nebs, check labs, chest x-ray to rule out pneumonia, RVP, and reassess.

## 2022-12-17 NOTE — ED PROVIDER NOTE - CARE PLAN
1 Principal Discharge DX:	2019 novel coronavirus disease (COVID-19)  Secondary Diagnosis:	Lower extremity weakness

## 2022-12-17 NOTE — ED PROVIDER NOTE - PHYSICAL EXAMINATION
GEN - NAD; well appearing; A+O x3   HEAD - NC/AT     EYES - EOMI, no conjunctival pallor, no scleral icterus  ENT -   mucous membranes  moist , no discharge      NECK - Neck supple  PULM - symmetric breath sounds, bilateral wheezes  COR -  RRR, S1 S2, no murmurs  ABD - , ND, NT, soft, no guarding, no rebound, no masses    BACK - no CVA tenderness, nontender spine     EXTREMS - no edema, no deformity, warm and well perfused    SKIN - no rash or bruising      NEUROLOGIC - alert, sensation nl, motor 5/5 RUE/LUE/RLE/LLE GEN - NAD; well appearing; A+O x3   HEAD - NC/AT     EYES - EOMI, no conjunctival pallor, no scleral icterus  ENT -   mucous membranes  moist , no discharge      NECK - Neck supple  PULM - bilateral wheezes  COR -  RRR, S1 S2, no murmurs  ABD - , ND, NT, soft, no guarding, no rebound, no masses    BACK - no CVA tenderness, nontender spine     EXTREMS - no edema, no deformity, warm and well perfused    SKIN - no rash or bruising   Neuro- A&Ox3, PERRLA, EOMI, no nystagmus, mild facial asymmetry-  notes looks same as usual , no pronator drift, normal finger to nose  normal sensation and 4/5 strength bilaterally  NIH2

## 2022-12-17 NOTE — ED PROVIDER NOTE - OBJECTIVE STATEMENT
72 year old female with PMHx of COPD/emphysema presents to the ED BIBEMS complaining of flu-like symptoms, worsening weakness in bilateral lower extremities, and difficulty breathing x 12/14. Pt uses nebulizers 1x/day at home. Pt is COVID vaccinated and received the Flu shot this year. 72 year old female with PMHx of COPD/emphysema presents to the ED BIBEMS complaining of flu-like symptoms, worsening weakness in bilateral lower extremities, and difficulty breathing x 12/14. pt states legs feel like jello.  reports she was crawling to the bathroom. Pt uses nebulizers 1x/day at home. Pt is COVID vaccinated and received the Flu shot this year. 72 year old female with PMHx of COPD/emphysema presents to the ED BIBEMS complaining of flu-like symptoms, worsening weakness in bilateral lower extremities, and difficulty breathing x 12/14. pt states legs feel like jello.  reports she was crawling to the bathroom, unclear when weakness started.. Pt uses nebulizers 1x/day at home. Pt is COVID vaccinated and received the Flu shot this year.

## 2022-12-18 LAB
ALBUMIN SERPL ELPH-MCNC: 3.2 G/DL — LOW (ref 3.3–5)
ALP SERPL-CCNC: 62 U/L — SIGNIFICANT CHANGE UP (ref 40–120)
ALT FLD-CCNC: 25 U/L — SIGNIFICANT CHANGE UP (ref 12–78)
AST SERPL-CCNC: 33 U/L — SIGNIFICANT CHANGE UP (ref 15–37)
BILIRUB DIRECT SERPL-MCNC: <0.1 MG/DL — SIGNIFICANT CHANGE UP (ref 0–0.3)
BILIRUB INDIRECT FLD-MCNC: >0.3 MG/DL — SIGNIFICANT CHANGE UP (ref 0.2–1)
BILIRUB SERPL-MCNC: 0.4 MG/DL — SIGNIFICANT CHANGE UP (ref 0.2–1.2)
CREAT SERPL-MCNC: 0.59 MG/DL — SIGNIFICANT CHANGE UP (ref 0.5–1.3)
CSF PCR RESULT: SIGNIFICANT CHANGE UP
EGFR: 96 ML/MIN/1.73M2 — SIGNIFICANT CHANGE UP
FOLATE SERPL-MCNC: >20 NG/ML — SIGNIFICANT CHANGE UP
HCV AB S/CO SERPL IA: 0.1 S/CO — SIGNIFICANT CHANGE UP (ref 0–0.99)
HCV AB SERPL-IMP: SIGNIFICANT CHANGE UP
NEUTROPHILS # CSF: SIGNIFICANT CHANGE UP (ref 0–6)
PROT SERPL-MCNC: 6.5 GM/DL — SIGNIFICANT CHANGE UP (ref 6–8.3)
VIT B12 SERPL-MCNC: 804 PG/ML — SIGNIFICANT CHANGE UP (ref 232–1245)

## 2022-12-18 PROCEDURE — 12345: CPT | Mod: NC

## 2022-12-18 PROCEDURE — 99223 1ST HOSP IP/OBS HIGH 75: CPT

## 2022-12-18 PROCEDURE — 99222 1ST HOSP IP/OBS MODERATE 55: CPT

## 2022-12-18 RX ORDER — REMDESIVIR 5 MG/ML
100 INJECTION INTRAVENOUS EVERY 24 HOURS
Refills: 0 | Status: COMPLETED | OUTPATIENT
Start: 2022-12-19 | End: 2022-12-22

## 2022-12-18 RX ORDER — ALBUTEROL 90 UG/1
2 AEROSOL, METERED ORAL EVERY 6 HOURS
Refills: 0 | Status: DISCONTINUED | OUTPATIENT
Start: 2022-12-18 | End: 2022-12-25

## 2022-12-18 RX ORDER — TIOTROPIUM BROMIDE 18 UG/1
2 CAPSULE ORAL; RESPIRATORY (INHALATION) DAILY
Refills: 0 | Status: DISCONTINUED | OUTPATIENT
Start: 2022-12-18 | End: 2022-12-25

## 2022-12-18 RX ORDER — REMDESIVIR 5 MG/ML
200 INJECTION INTRAVENOUS EVERY 24 HOURS
Refills: 0 | Status: COMPLETED | OUTPATIENT
Start: 2022-12-18 | End: 2022-12-18

## 2022-12-18 RX ORDER — REMDESIVIR 5 MG/ML
INJECTION INTRAVENOUS
Refills: 0 | Status: COMPLETED | OUTPATIENT
Start: 2022-12-18 | End: 2022-12-22

## 2022-12-18 RX ORDER — DULOXETINE HYDROCHLORIDE 30 MG/1
60 CAPSULE, DELAYED RELEASE ORAL DAILY
Refills: 0 | Status: DISCONTINUED | OUTPATIENT
Start: 2022-12-18 | End: 2022-12-25

## 2022-12-18 RX ORDER — ZOLPIDEM TARTRATE 10 MG/1
5 TABLET ORAL AT BEDTIME
Refills: 0 | Status: DISCONTINUED | OUTPATIENT
Start: 2022-12-18 | End: 2022-12-18

## 2022-12-18 RX ORDER — CHOLECALCIFEROL (VITAMIN D3) 125 MCG
1000 CAPSULE ORAL DAILY
Refills: 0 | Status: DISCONTINUED | OUTPATIENT
Start: 2022-12-18 | End: 2022-12-25

## 2022-12-18 RX ORDER — GABAPENTIN 400 MG/1
300 CAPSULE ORAL AT BEDTIME
Refills: 0 | Status: DISCONTINUED | OUTPATIENT
Start: 2022-12-18 | End: 2022-12-25

## 2022-12-18 RX ORDER — TRAMADOL HYDROCHLORIDE 50 MG/1
50 TABLET ORAL EVERY 6 HOURS
Refills: 0 | Status: DISCONTINUED | OUTPATIENT
Start: 2022-12-18 | End: 2022-12-22

## 2022-12-18 RX ORDER — ATORVASTATIN CALCIUM 80 MG/1
80 TABLET, FILM COATED ORAL AT BEDTIME
Refills: 0 | Status: DISCONTINUED | OUTPATIENT
Start: 2022-12-18 | End: 2022-12-25

## 2022-12-18 RX ORDER — ZOLPIDEM TARTRATE 10 MG/1
5 TABLET ORAL AT BEDTIME
Refills: 0 | Status: DISCONTINUED | OUTPATIENT
Start: 2022-12-18 | End: 2022-12-25

## 2022-12-18 RX ORDER — ENOXAPARIN SODIUM 100 MG/ML
40 INJECTION SUBCUTANEOUS EVERY 24 HOURS
Refills: 0 | Status: DISCONTINUED | OUTPATIENT
Start: 2022-12-18 | End: 2022-12-25

## 2022-12-18 RX ORDER — ASPIRIN/CALCIUM CARB/MAGNESIUM 324 MG
81 TABLET ORAL DAILY
Refills: 0 | Status: DISCONTINUED | OUTPATIENT
Start: 2022-12-18 | End: 2022-12-25

## 2022-12-18 RX ORDER — ROPINIROLE 8 MG/1
0.25 TABLET, FILM COATED, EXTENDED RELEASE ORAL DAILY
Refills: 0 | Status: DISCONTINUED | OUTPATIENT
Start: 2022-12-18 | End: 2022-12-25

## 2022-12-18 RX ORDER — DEXAMETHASONE 0.5 MG/5ML
6 ELIXIR ORAL DAILY
Refills: 0 | Status: DISCONTINUED | OUTPATIENT
Start: 2022-12-18 | End: 2022-12-22

## 2022-12-18 RX ORDER — PRIMIDONE 250 MG/1
50 TABLET ORAL
Refills: 0 | Status: DISCONTINUED | OUTPATIENT
Start: 2022-12-18 | End: 2022-12-25

## 2022-12-18 RX ORDER — ATORVASTATIN CALCIUM 80 MG/1
10 TABLET, FILM COATED ORAL AT BEDTIME
Refills: 0 | Status: DISCONTINUED | OUTPATIENT
Start: 2022-12-18 | End: 2022-12-18

## 2022-12-18 RX ADMIN — Medication 81 MILLIGRAM(S): at 10:46

## 2022-12-18 RX ADMIN — GABAPENTIN 300 MILLIGRAM(S): 400 CAPSULE ORAL at 21:04

## 2022-12-18 RX ADMIN — Medication 600 MILLIGRAM(S): at 21:04

## 2022-12-18 RX ADMIN — ATORVASTATIN CALCIUM 80 MILLIGRAM(S): 80 TABLET, FILM COATED ORAL at 21:03

## 2022-12-18 RX ADMIN — REMDESIVIR 200 MILLIGRAM(S): 5 INJECTION INTRAVENOUS at 06:58

## 2022-12-18 RX ADMIN — PRIMIDONE 50 MILLIGRAM(S): 250 TABLET ORAL at 18:12

## 2022-12-18 RX ADMIN — ENOXAPARIN SODIUM 40 MILLIGRAM(S): 100 INJECTION SUBCUTANEOUS at 06:48

## 2022-12-18 RX ADMIN — ALBUTEROL 1 PUFF(S): 90 AEROSOL, METERED ORAL at 05:19

## 2022-12-18 RX ADMIN — TRAMADOL HYDROCHLORIDE 50 MILLIGRAM(S): 50 TABLET ORAL at 06:47

## 2022-12-18 RX ADMIN — Medication 6 MILLIGRAM(S): at 10:46

## 2022-12-18 RX ADMIN — TRAMADOL HYDROCHLORIDE 50 MILLIGRAM(S): 50 TABLET ORAL at 07:41

## 2022-12-18 RX ADMIN — PRIMIDONE 50 MILLIGRAM(S): 250 TABLET ORAL at 10:46

## 2022-12-18 RX ADMIN — TRAMADOL HYDROCHLORIDE 50 MILLIGRAM(S): 50 TABLET ORAL at 23:36

## 2022-12-18 RX ADMIN — ROPINIROLE 0.25 MILLIGRAM(S): 8 TABLET, FILM COATED, EXTENDED RELEASE ORAL at 10:45

## 2022-12-18 RX ADMIN — DULOXETINE HYDROCHLORIDE 60 MILLIGRAM(S): 30 CAPSULE, DELAYED RELEASE ORAL at 10:45

## 2022-12-18 RX ADMIN — Medication 1000 UNIT(S): at 10:47

## 2022-12-18 RX ADMIN — Medication 600 MILLIGRAM(S): at 18:13

## 2022-12-18 RX ADMIN — PRIMIDONE 50 MILLIGRAM(S): 250 TABLET ORAL at 14:18

## 2022-12-18 RX ADMIN — PRIMIDONE 50 MILLIGRAM(S): 250 TABLET ORAL at 23:10

## 2022-12-18 NOTE — H&P ADULT - NSHPREVIEWOFSYSTEMS_GEN_ALL_CORE
REVIEW OF SYSTEMS:  CONSTITUTIONAL: (+) weakness, (-) fevers, (-) chills  EYES/ENT: (-) visual changes,  (-) vertigo,  (-) throat pain   NECK:  (-) pain, (-) stiffness  RESPIRATORY:  (+) shortness of breath, (+) cough,  (-) wheezing,  (-) hemoptysis   CARDIOVASCULAR:  (-) chest pain, (-) palpitations  GASTROINTESTINAL:  (-) abdominal or epigastric pain, (-) nausea, (-) vomiting, (-) diarrhea, (-) constipation, (-) melena,  (-) hematemesis,  (-) hematochezia  GENITOURINARY: (-) dysuria, (-) frequency, (-) hematuria  NEUROLOGICAL: (-) numbness, (+) weakness  SKIN: (-) itching, (-) rashes, (-) lesions

## 2022-12-18 NOTE — H&P ADULT - HISTORY OF PRESENT ILLNESS
71 y/o F with PMHx of Emphysema, Breast Ca s/p mastectomy, Melanoma, Ovarian tumor (benign, per pt) Hx of herniated disc, chronic back pain, neuropathy with chronic left foot drop, Essential Tremors presents to  c/o URI like symptoms. Reports of cough, congestion, genearlized malaise. States symptoms started on 12/14. Patient also reports of generalized weakness, reports of perioral tingling. Reports of "curling" of the fingers in the right hand. Patient reports Hx of multiple herniated discs in the "L region" and had MRI done last year at [facility]. Patient denies  headache, dizziness, change in vision, blurry vision, anosmia, ageusia, chest pain, palpitations, shortness of breath, abdominal pain, nausea, vomiting, diarrhea, constipation, change in bowel movement, weight loss, dysuria, urinary frequency, urgency, hematuria, urinary/bowel incontinence or saddle anesthesia.   In ED, T: 98.2, HR: 77, BP: 105/60, RR: 18, SpO2 90% on RA. Pt received IV fluids and dose of duoneb. Reports feeling better after the treatment. LP was performed as there was concern for ascending weakness/paralysis     PMH  1) Emphysema, not on home oxygen   2) Breast CA   3) Melanoma   4) Herniated disc, chronic back pain with sciatica   5) Neuropathy with chronic left foot   6) Benign ovarian tumor   7) Essential Tremor     SHx  1) Mastectomy   2) Stapedectomy    FHx  1) Father: none   2) Mother: Breast CA

## 2022-12-18 NOTE — PROGRESS NOTE ADULT - SUBJECTIVE AND OBJECTIVE BOX
CC: shortness of breath (18 Dec 2022 10:57)    HPI:  71 y/o F with PMHx of Emphysema, Breast Ca s/p mastectomy, Melanoma, Ovarian tumor (benign, per pt) Hx of herniated disc, chronic back pain, neuropathy with chronic left foot drop, Essential Tremors presents to  c/o URI like symptoms. Reports of cough, congestion, genearlized malaise. States symptoms started on 12/14. Patient also reports of generalized weakness, reports of perioral tingling. Reports of "curling" of the fingers in the right hand. Patient reports Hx of multiple herniated discs in the "L region" and had MRI done last year at [facility], [place]. Patient denies  headache, dizziness, change in vision, blurry vision, anosmia, ageusia, chest pain, palpitations, shortness of breath, abdominal pain, nausea, vomiting, diarrhea, constipation, change in bowel movement, weight loss, dysuria, urinary frequency, urgency, hematuria, urinary/bowel incontinence or saddle anesthesia.   In ED, T: 98.2, HR: 77, BP: 105/60, RR: 18, SpO2 90% on RA. Pt received IV fluids and dose of duoneb. Reports feeling better after the treatment. LP was performed as there was concern for ascending weakness/paralysis       INTERVAL HPI/OVERNIGHT EVENTS:    Vital Signs Last 24 Hrs  T(C): 36.6 (18 Dec 2022 08:51), Max: 37.2 (18 Dec 2022 03:21)  T(F): 97.8 (18 Dec 2022 08:51), Max: 99 (18 Dec 2022 03:21)  HR: 88 (18 Dec 2022 08:51) (68 - 88)  BP: 104/69 (18 Dec 2022 08:51) (104/69 - 121/67)  BP(mean): --  RR: 18 (18 Dec 2022 08:51) (18 - 18)  SpO2: 90% (18 Dec 2022 08:51) (90% - 95%)    Parameters below as of 18 Dec 2022 08:51  Patient On (Oxygen Delivery Method): room air      I&O's Detail    REVIEW OF SYSTEMS:    CONSTITUTIONAL: No weakness, fevers or chills  EYES/ENT: No visual changes;  No vertigo or throat pain   NECK: No pain or stiffness  RESPIRATORY: No cough, wheezing, hemoptysis; No shortness of breath  CARDIOVASCULAR: No chest pain or palpitations  GASTROINTESTINAL: No abdominal or epigastric pain. No nausea, vomiting, or hematemesis; No diarrhea or constipation. No melena or hematochezia.  GENITOURINARY: No dysuria, frequency or hematuria  NEUROLOGICAL: No numbness or weakness  SKIN: No itching, burning, rashes, or lesions   All other review of systems is negative unless indicated above.  PHYSICAL EXAM:    General: Well developed; well nourished; in no acute distress  Eyes: PERRLA, EOMI; conjunctiva and sclera clear  Head: Normocephalic; atraumatic  ENMT: No nasal discharge; airway clear  Neck: Supple; non tender; no masses  Respiratory: No wheezes, rales or rhonchi  Cardiovascular: Regular rate and rhythm. S1 and S2 Normal; No murmurs, gallops or rubs  Gastrointestinal: Soft non-tender non-distended; Normal bowel sounds  Genitourinary: No  suprapubic  tenderness  Extremities: Normal range of motion, No clubbing, cyanosis or edema  Vascular: Peripheral pulses palpable 2+ bilaterally  Neurological: Alert and oriented x4  Skin: Warm and dry. No acute rash  Lymph Nodes: No acute cervical adenopathy  Musculoskeletal: Normal muscle tone, without deformities  Psychiatric: Cooperative and appropriate                            13.9   3.54  )-----------( 173      ( 17 Dec 2022 20:01 )             40.2     18 Dec 2022 08:14    x      |  x      |  x      ----------------------------<  x      x       |  x      |  0.59     Ca    8.3        17 Dec 2022 20:01    TPro  6.5    /  Alb  3.2    /  TBili  0.4    /  DBili  <0.1   /  AST  33     /  ALT  25     /  AlkPhos  62     18 Dec 2022 08:14      CAPILLARY BLOOD GLUCOSE        LIVER FUNCTIONS - ( 18 Dec 2022 08:14 )  Alb: 3.2 g/dL / Pro: 6.5 gm/dL / ALK PHOS: 62 U/L / ALT: 25 U/L / AST: 33 U/L / GGT: x               MEDICATIONS  (STANDING):  aspirin enteric coated 81 milliGRAM(s) Oral daily  atorvastatin 10 milliGRAM(s) Oral at bedtime  cholecalciferol 1000 Unit(s) Oral daily  dexAMETHasone  Injectable 6 milliGRAM(s) IV Push daily  DULoxetine 60 milliGRAM(s) Oral daily  enoxaparin Injectable 40 milliGRAM(s) SubCutaneous every 24 hours  gabapentin 300 milliGRAM(s) Oral at bedtime  primidone 50 milliGRAM(s) Oral four times a day  remdesivir  IVPB   IV Intermittent   rOPINIRole 0.25 milliGRAM(s) Oral daily    MEDICATIONS  (PRN):  traMADol 50 milliGRAM(s) Oral every 6 hours PRN Moderate Pain (4 - 6)  zolpidem 5 milliGRAM(s) Oral at bedtime PRN Insomnia  zolpidem 5 milliGRAM(s) Oral at bedtime PRN Insomnia      RADIOLOGY & ADDITIONAL TESTS: CC: shortness of breath (18 Dec 2022 10:57)    HPI:  71 y/o F with PMHx of Emphysema, Breast Ca s/p mastectomy, Melanoma, Ovarian tumor (benign, per pt) Hx of herniated disc, chronic back pain, neuropathy with chronic left foot drop, Essential Tremors presents to  c/o URI like symptoms. Reports of cough, congestion, genearlized malaise. States symptoms started on 12/14. Patient also reports of generalized weakness, reports of perioral tingling. Reports of "curling" of the fingers in the right hand. Patient reports Hx of multiple herniated discs in the "L region" and had MRI done last year at [facility]. Patient denies  headache, dizziness, change in vision, blurry vision, anosmia, ageusia, chest pain, palpitations, shortness of breath, abdominal pain, nausea, vomiting, diarrhea, constipation, change in bowel movement, weight loss, dysuria, urinary frequency, urgency, hematuria, urinary/bowel incontinence or saddle anesthesia.   In ED, T: 98.2, HR: 77, BP: 105/60, RR: 18, SpO2 90% on RA. Pt received IV fluids and dose of duoneb. Reports feeling better after the treatment. LP was performed as there was concern for ascending weakness/paralysis       INTERVAL HPI/ OVERNIGHT EVENTS: chart reviewed, Pt was seen and examined, confirmed history above, reports start feeling not well last Wednesday, has some cough, congestion with SOB and wheezing which is all better now,  later Pt noted that her L foot weaker then usual  (has L neuropathy and weakness) but also noted R leg and R hand weakness. Denies difficulty swallowing or speech problem. Results and further plan d/w Pt     Vital Signs Last 24 Hrs  T(C): 36.6 (18 Dec 2022 08:51), Max: 37.2 (18 Dec 2022 03:21)  T(F): 97.8 (18 Dec 2022 08:51), Max: 99 (18 Dec 2022 03:21)  HR: 88 (18 Dec 2022 08:51) (68 - 88)  BP: 104/69 (18 Dec 2022 08:51) (104/69 - 121/67)  RR: 18 (18 Dec 2022 08:51) (18 - 18)  SpO2: 90% (18 Dec 2022 08:51) (90% - 95%)    Parameters below as of 18 Dec 2022 08:51  Patient On (Oxygen Delivery Method): room air          REVIEW OF SYSTEMS:  All other review of systems is negative unless indicated above.        PHYSICAL EXAM:  General: Well developed;  frail elderly female,  in no acute distress  Eyes: PERRLA, EOMI; conjunctiva and sclera clear  Head: Normocephalic; atraumatic  ENMT: No nasal discharge; airway clear  Neck: Supple; non tender; no masses  Respiratory: No wheezes, rales or rhonchi  Cardiovascular: Regular rate and rhythm. S1 and S2 Normal; No murmurs, gallops or rubs  Gastrointestinal: Soft non-tender non-distended; Normal bowel sounds  Genitourinary: No  suprapubic  tenderness  Extremities: Normal range of motion, No clubbing, cyanosis or edema  Vascular: Peripheral pulses palpable 2+ bilaterally  Neurological: Alert and oriented x4  Skin: Warm and dry. No acute rash  Lymph Nodes: No acute cervical adenopathy  Musculoskeletal: Normal muscle tone, without deformities  Psychiatric: Cooperative and appropriate                            13.9   3.54  )-----------( 173      ( 17 Dec 2022 20:01 )             40.2     18 Dec 2022 08:14    x      |  x      |  x      ----------------------------<  x      x       |  x      |  0.59     Ca    8.3        17 Dec 2022 20:01    TPro  6.5    /  Alb  3.2    /  TBili  0.4    /  DBili  <0.1   /  AST  33     /  ALT  25     /  AlkPhos  62     18 Dec 2022 08:14      CAPILLARY BLOOD GLUCOSE        LIVER FUNCTIONS - ( 18 Dec 2022 08:14 )  Alb: 3.2 g/dL / Pro: 6.5 gm/dL / ALK PHOS: 62 U/L / ALT: 25 U/L / AST: 33 U/L / GGT: x               MEDICATIONS  (STANDING):  aspirin enteric coated 81 milliGRAM(s) Oral daily  atorvastatin 10 milliGRAM(s) Oral at bedtime  cholecalciferol 1000 Unit(s) Oral daily  dexAMETHasone  Injectable 6 milliGRAM(s) IV Push daily  DULoxetine 60 milliGRAM(s) Oral daily  enoxaparin Injectable 40 milliGRAM(s) SubCutaneous every 24 hours  gabapentin 300 milliGRAM(s) Oral at bedtime  primidone 50 milliGRAM(s) Oral four times a day  remdesivir  IVPB   IV Intermittent   rOPINIRole 0.25 milliGRAM(s) Oral daily    MEDICATIONS  (PRN):  traMADol 50 milliGRAM(s) Oral every 6 hours PRN Moderate Pain (4 - 6)  zolpidem 5 milliGRAM(s) Oral at bedtime PRN Insomnia  zolpidem 5 milliGRAM(s) Oral at bedtime PRN Insomnia      RADIOLOGY & ADDITIONAL TESTS:

## 2022-12-18 NOTE — H&P ADULT - ASSESSMENT
71 y/o F with PMHx of Emphysema, Breast Ca s/p mastectomy, Melanoma, Ovarian tumor (benign, per pt) Hx of herniated disc, chronic back pain, neuropathy with chronic left foot drop, Essential Tremors presents to  c/o URI like symptom, back pain      #Acute Hypoxic Respiratory Failure due to COVID-19   -Remdesvir + Decadron   -duonebs PRN   -supplemental oxygen PRN with target SpO2>92  -antipyretics and antiemetics PRN   -ID consult     #Chronic back pain, Hx of herniated disc   -Duloxetine   -Gabapentin   -Ropinirole    #Essential Tremor   -Primidone     #Insomnia   -Zolpidem     #DVT Prophylaxis       #DIET   -DASH/TLC    73 y/o F with PMHx of Emphysema, Breast Ca s/p mastectomy, Melanoma, Ovarian tumor (benign, per pt) Hx of herniated disc, chronic back pain, neuropathy with chronic left foot drop, Essential Tremors presents to  c/o URI like symptom, back pain      #Acute Hypoxic Respiratory Failure due to COVID-19   -Remdesvir + Decadron   -duonebs PRN   -supplemental oxygen PRN with target SpO2>92  -antipyretics and antiemetics PRN   -ID consult     #Chronic back pain, Hx of herniated disc, ????worsening LE weakness and right hand cramping, ? concern for GBS s/p flu vaccination 2 months ago   -s/p LP in ED   -f/u on CSF studies   -f/u on Vitamin B-12, folate   -neurochecks q4   -Neurology consult   -Duloxetine   -Gabapentin   -Ropinirole    #Essential Tremor   -Primidone     #Insomnia   -Zolpidem     #DVT Prophylaxis   -Lovenox     #DIET   -DASH/TLC    73 y/o F with PMHx of Emphysema, Breast Ca s/p mastectomy, Melanoma, Ovarian tumor (benign, per pt) Hx of herniated disc, chronic back pain, neuropathy with chronic left foot drop, Essential Tremors presents to  c/o URI like symptom, back pain      #Acute Hypoxic Respiratory Failure due to COVID-19   -Remdesvir + Decadron   -duonebs PRN   -supplemental oxygen PRN with target SpO2>92  -antipyretics and antiemetics PRN   -ID consult     #Hyponatremia   -mild   -trend     #Chronic back pain, Hx of herniated disc, ????worsening LE weakness and right hand cramping, ? concern for GBS s/p flu vaccination 2 months ago   -s/p LP in ED   -f/u on CSF studies   -f/u on Vitamin B-12, folate   -neurochecks q4   -Neurology consult   -Duloxetine   -Gabapentin   -Ropinirole    #Emphysema   -on Anoro Ellipta     #Essential Tremor   -Primidone     #Insomnia   -Zolpidem     #DVT Prophylaxis   -Lovenox     #DIET   -DASH/TLC    73 y/o F with PMHx of Emphysema, Breast Ca s/p mastectomy, Melanoma, Ovarian tumor (benign, per pt) Hx of herniated disc, chronic back pain, neuropathy with chronic left foot drop, Essential Tremors presents to  c/o URI like symptom, back pain      #Acute Hypoxic Respiratory Failure due to COVID-19   -Remdesvir + Decadron   -duonebs PRN   -supplemental oxygen PRN with target SpO2>92  -antipyretics and antiemetics PRN   -ID consult     #Hyponatremia   -mild   -trend     #Chronic back pain, Hx of herniated disc, ????worsening LE weakness and right hand cramping, ? concern for GBS s/p flu vaccination 2 months ago   -s/p LP in ED   -f/u on CSF studies   -f/u on Vitamin B-12, folate   -neurochecks q4   -Neurology consult   -Duloxetine   -Gabapentin   -Ropinirole  -consider ortho eval     #Emphysema   -on Anoro Ellipta     #Essential Tremor   -Primidone     #Insomnia   -Zolpidem     #DVT Prophylaxis   -Lovenox     #DIET   -DASH/TLC

## 2022-12-18 NOTE — CONSULT NOTE ADULT - SUBJECTIVE AND OBJECTIVE BOX
Patient is a 72y old  Female who presents with a chief complaint of shortness of breath    HPI:  71 y/o Female with h/o Emphysema, Breast Ca s/p mastectomy, Melanoma, Ovarian tumor (benign, per pt), lumbar herniated disc, chronic back pain, neuropathy with chronic left foot drop, essential tremors was admitted on 12/17 for URI like symptoms. Reports of cough, congestion, generalized malaise. States symptoms started on 12/14. Patient also reports of generalized weakness, reports of perioral tingling. Reports of "curling" of the fingers in the right hand. Patient denies  headache, dizziness, change in vision, blurry vision, anosmia, ageusia, chest pain, palpitations, abdominal pain, nausea, vomiting, diarrhea, constipation, change in bowel movement, weight loss, dysuria, urinary frequency, urgency, hematuria, urinary/bowel incontinence or saddle anesthesia. In ER she was noted afebrile, but hypoxic and an LP was performed as there was concern for ascending weakness/paralysis. A COVID-19 PCR test was detected. She received remdesivir.     PMH  1) Emphysema, not on home oxygen   2) Breast CA   3) Melanoma   4) Herniated disc, chronic back pain with sciatica   5) Neuropathy with chronic left foot   6) Benign ovarian tumor   7) Essential Tremor     SHx  1) Mastectomy   2) Stapedectomy    Meds: per reconciliation sheet, noted below  MEDICATIONS  (STANDING):  aspirin enteric coated 81 milliGRAM(s) Oral daily  atorvastatin 10 milliGRAM(s) Oral at bedtime  cholecalciferol 1000 Unit(s) Oral daily  dexAMETHasone  Injectable 6 milliGRAM(s) IV Push daily  DULoxetine 60 milliGRAM(s) Oral daily  enoxaparin Injectable 40 milliGRAM(s) SubCutaneous every 24 hours  gabapentin 300 milliGRAM(s) Oral at bedtime  primidone 50 milliGRAM(s) Oral four times a day  remdesivir  IVPB   IV Intermittent   rOPINIRole 0.25 milliGRAM(s) Oral daily    MEDICATIONS  (PRN):  traMADol 50 milliGRAM(s) Oral every 6 hours PRN Moderate Pain (4 - 6)  zolpidem 5 milliGRAM(s) Oral at bedtime PRN Insomnia  zolpidem 5 milliGRAM(s) Oral at bedtime PRN Insomnia    Allergies    No Known Allergies    Intolerances      Social: no smoking, no alcohol, no illegal drugs; no recent travel, no exposure to TB  FAMILY HISTORY:    no history of premature cardiovascular disease in first degree relatives    ROS: the patient denies HA, no seizures, no dizziness, no sore throat, no nasal congestion, no blurry vision, no CP, no palpitations, no SOB, has cough, no abdominal pain, no diarrhea, no N/V, no dysuria, no leg pain, no claudication, no rash, no joint aches, no rectal pain or bleeding, no night sweats, has low back pain, has increased weakness  All other systems reviewed and are negative    Vital Signs Last 24 Hrs  T(C): 36.6 (18 Dec 2022 08:51), Max: 37.2 (18 Dec 2022 03:21)  T(F): 97.8 (18 Dec 2022 08:51), Max: 99 (18 Dec 2022 03:21)  HR: 88 (18 Dec 2022 08:51) (68 - 88)  BP: 104/69 (18 Dec 2022 08:51) (104/69 - 121/67)  BP(mean): --  RR: 18 (18 Dec 2022 08:51) (18 - 18)  SpO2: 90% (18 Dec 2022 08:51) (90% - 95%)    Parameters below as of 18 Dec 2022 08:51  Patient On (Oxygen Delivery Method): room air      Daily Height in cm: 162.56 (17 Dec 2022 19:04)    Daily     PE:    Constitutional:  No acute distress  HEENT: NC/AT, EOMI, PERRLA, conjunctivae clear; ears and nose atraumatic; pharynx benign  Neck: supple; thyroid not palpable  Back: no tenderness  Respiratory: respiratory effort normal; crackles at bases  Cardiovascular: S1S2 regular, no murmurs  Abdomen: soft, not tender, not distended, positive BS; no liver or spleen organomegaly  Genitourinary: no suprapubic tenderness  Lymphatic: no LN palpable  Musculoskeletal: no muscle tenderness, no joint swelling or tenderness  Extremities: no pedal edema  Neurological/ Psychiatric: AxOx3, judgement and insight normal; moving all extremities  Skin: no rashes; no palpable lesions    Labs: all available labs reviewed                        13.9   3.54  )-----------( 173      ( 17 Dec 2022 20:01 )             40.2     12-18    x   |  x   |  x   ----------------------------<  x   x    |  x   |  0.59    Ca    8.3<L>      17 Dec 2022 20:01    TPro  6.5  /  Alb  3.2<L>  /  TBili  0.4  /  DBili  <0.1  /  AST  33  /  ALT  25  /  AlkPhos  62  12-18     LIVER FUNCTIONS - ( 18 Dec 2022 08:14 )  Alb: 3.2 g/dL / Pro: 6.5 gm/dL / ALK PHOS: 62 U/L / ALT: 25 U/L / AST: 33 U/L / GGT: x             Culture - CSF with Gram Stain (collected 17 Dec 2022 22:24)  Source: .CSF  Gram Stain (18 Dec 2022 06:18):    No polymorphonuclear leukocytes seen    No organisms seen    by cytocentrifuge    Radiology: all available radiological tests reviewed    < from: CT Head No Cont (12.17.22 @ 23:20) >  No acute intracranial hemorrhage, vasogenic edema, extra-axial collection   or hydrocephalus. Tiny chronic lacunar infarcts head of the right caudate   nucleus and right corona radiata.  < end of copied text >    Advanced directives addressed: full resuscitation

## 2022-12-18 NOTE — H&P ADULT - NSHPPHYSICALEXAM_GEN_ALL_CORE
Vitals  T(F): 99 (12-18-22 @ 03:21), Max: 99 (12-18-22 @ 03:21)  HR: 69 (12-18-22 @ 03:21) (68 - 77)  BP: 116/60 (12-18-22 @ 03:21) (105/60 - 116/60)  RR: 18 (12-18-22 @ 03:21) (18 - 18)  SpO2: 95% (12-18-22 @ 03:21) (90% - 95%)    PHYSICAL EXAM   Gen: NAD, comfortable, AA&Ox4  HEENT: head atrumatic and normocephalic, PEERLA, EOMI,  no gross abnormalities of ears, mucous membranes moist, no oral lesions, neck supple without masses/goiter/lymphadenopathy, no JVD  CVS: +s1, s2, regular rate and rhythm, no murmurs, rubs or gallops, no thrill, normal PMI  Pulmonary: normal respiratory effort, clear to auscultation b/l, no wheezes/crackles/rhonchi  Abdomen: soft, non-tender, non-distended, +bowel sounds in all 4 quadrants, no masses noted, no guarding or rigidity   Back: no scoliosis, lordosis, or kyphosis, no point tenderness, no CVA tenderness   Extremities: no pedal edema, pedal pulses palpable, <2 sec. cap refill   Skin: nl warm and dry, no wounds   Neuro: answering questions appropriately, face symmetric, sensation equal bilaterally in face, tongue midline, no dysarthria, 5/5 strength in upper and lower extremities bilaterally, sensation intact in upper and lower extremities bilaterally, nl finger to nose, and nl heel to shin Vitals  T(F): 99 (12-18-22 @ 03:21), Max: 99 (12-18-22 @ 03:21)  HR: 69 (12-18-22 @ 03:21) (68 - 77)  BP: 116/60 (12-18-22 @ 03:21) (105/60 - 116/60)  RR: 18 (12-18-22 @ 03:21) (18 - 18)  SpO2: 95% (12-18-22 @ 03:21) (90% - 95%)    PHYSICAL EXAM   Gen: NAD, comfortable, AA&Ox4  HEENT: head atraumatic and normocephalic, PEERLA, EOMI,  no gross abnormalities of ears, mucous membranes moist, no oral lesions, neck supple without masses/goiter/lymphadenopathy, no JVD  CVS: +s1, s2, regular rate and rhythm, no murmurs, rubs or gallops, no thrill, normal PMI  Pulmonary: +expiratory wheeze noted   Abdomen: soft, non-tender, non-distended, +bowel sounds in all 4 quadrants, no masses noted, no guarding or rigidity   Back no point tenderness, no CVA tenderness   Extremities: no pedal edema, pedal pulses palpable, <2 sec. cap refill   Skin: nl warm and dry, no wounds   Neuro: answering questions appropriately, face symmetric, sensation equal bilaterally in face, tongue midline, no dysarthria, 5/5 strength in upper extremities and 4/5 in LLE and 5/5 in RLE extremities, sensation intact in upper and lower extremities bilaterally, nl finger to nose, and nl heel to shin

## 2022-12-18 NOTE — CONSULT NOTE ADULT - SUBJECTIVE AND OBJECTIVE BOX
Patient is a 72y old  Female who presents with a chief complaint of shortness of breath (18 Dec 2022 11:47)      CC: weakness    HPI:  71 y/o F with PMHx of Emphysema, Breast Ca s/p mastectomy, Melanoma, Ovarian tumor (benign, per pt) Hx of herniated disc, chronic back pain, neuropathy with chronic left foot drop, Essential Tremors presents to  c/o URI like symptoms. Reports of cough, congestion, genearlized malaise. States symptoms started on 12/14. Patient also reports of generalized weakness, reports of perioral tingling. Reports of "curling" of the fingers in the right hand. Patient reports Hx of multiple herniated discs in the "L region" and had MRI done last year at [facility]. Patient denies  headache, dizziness, change in vision, blurry vision, anosmia, ageusia, chest pain, palpitations, shortness of breath, abdominal pain, nausea, vomiting, diarrhea, constipation, change in bowel movement, weight loss, dysuria, urinary frequency, urgency, hematuria, urinary/bowel incontinence or saddle anesthesia.   In ED, T: 98.2, HR: 77, BP: 105/60, RR: 18, SpO2 90% on RA. Pt received IV fluids and dose of duoneb. Reports feeling better after the treatment. LP was performed as there was concern for ascending weakness/paralysis     Patient reports weakness in the right hand/forearm and right leg since Friday.    CSF: WBC < 1, protein = 39, glucose = 63    PAST MEDICAL & SURGICAL HISTORY:  Emphysema/COPD  Breast CA  melanoma  benign ovarian tumor  chronic LBP with left foot drop    Social Hx:  Nonsmoker, no drug or alcohol use    MEDICATIONS  (STANDING):  aspirin enteric coated 81 milliGRAM(s) Oral daily  atorvastatin 80 milliGRAM(s) Oral at bedtime  cholecalciferol 1000 Unit(s) Oral daily  dexAMETHasone  Injectable 6 milliGRAM(s) IV Push daily  DULoxetine 60 milliGRAM(s) Oral daily  enoxaparin Injectable 40 milliGRAM(s) SubCutaneous every 24 hours  gabapentin 300 milliGRAM(s) Oral at bedtime  guaiFENesin  milliGRAM(s) Oral every 12 hours  primidone 50 milliGRAM(s) Oral four times a day  remdesivir  IVPB   IV Intermittent   rOPINIRole 0.25 milliGRAM(s) Oral daily  tiotropium 2.5 MICROgram(s) Inhaler 2 Puff(s) Inhalation daily     Allergies  No Known Allergies    ROS: Pertinent positives in HPI, all other ROS were reviewed and are negative.      Vital Signs Last 24 Hrs  T(C): 36.7 (18 Dec 2022 17:23), Max: 37.2 (18 Dec 2022 03:21)  T(F): 98 (18 Dec 2022 17:23), Max: 99 (18 Dec 2022 03:21)  HR: 88 (18 Dec 2022 17:23) (68 - 88)  BP: 120/67 (18 Dec 2022 17:23) (104/69 - 121/67)  BP(mean): --  RR: 18 (18 Dec 2022 17:23) (18 - 18)  SpO2: 94% (18 Dec 2022 17:23) (90% - 95%)    Parameters below as of 18 Dec 2022 17:23  Patient On (Oxygen Delivery Method): room air      Neurological exam:  HF: A x O x 3. Appropriately interactive, normal affect. Speech fluent, No Aphasia or paraphasic errors. Naming /repetition intact   CN: ADAN, EOMI, VFF, facial sensation normal, +flattened left NL fold, tongue midline, Palate moves equally, SCM equal bilaterally  Motor: 4/5 right hand , +right pronator drift, RLE 3/5, LUE 5/5, LLE 5/5 proximally, 2/5 left foot dorsiflexion (chronic)  Sens: Intact to light touch / PP/ VS/ JS    Reflexes: 2+ in LUE/LLE (except for 0 ankle jerk), relatively brisk in RUE/RLE, downgoing toe on left, mute toe on right  Coord:  No dysmetria    NIHSS: 5    1A: Level of consciousness       0= Alert; keenly responsive       +1= Arouses to minor stimulation       +2= Requires repeated stimulation to arouse       +2= Movements to pain       +3= Postures or unresponsive    1B: Ask month and age       0= Both questions right       +1= 1 question right       +2= 0 questions right       +1= Dysarthric/intubated/trauma/language barrier       +2= Aphasic    1C: "Blink eyes" and "Squeeze Hands"       0= Performs both       +1= Performs 1 task       +2= Performs 0 tasks    2: Horizontal EOMs       0= Normal       +1= Partial gaze palsy: can be overcome       +1= Partial gaze palsy: corrects w/ oculocephalic reflex        +2= Forzed gaze palsy: cannot be overcome    3: Visual fields       0= No visual loss       +1= Partial hemianopia       +2= Complete hemianopia       +3= Patient is b/l blind       +3= B/l hemianopia    4: Facial palsy (use grimace if obtunded)       0= Normal symmetry       +1= Minor paralysis (flat NLF, smile asymmetry)       +2= Partial paralysis ( lower face)       +3= Unilateral complete paralysis (upper/lower face)       +3= B/l complete paralysis (upper/lower face)    5A: Left arm motor drift (count out loud and use fingers to show count)       0= No drift x 10 seconds       +1= Drift but doesn't hit bed       +2= Drift, hits bed       +2= Some effort against gravity       +3= No effort against gravity       +4= No movement       0= Amputation/joint fusion    5B: Right arm motor drift       0= No drift x 10 seconds       +1= Drift but doesn't hit bed       +2= Drift, hits bed       +2= Some effort against gravity       +3= No effort against gravity       +4= No movement       0= Amputation/joint fusion    6A: Left leg motor drift       0= No drift x 10 seconds       +1= Drift but doesn't hit bed       +2= Drift, hits bed       +2= Some effort against gravity       +3= No effort against gravity       +4= No movement       0= Amputation/joint fusion    6B: Right leg motor drift       0= No drift x 10 seconds       +1= Drift but doesn't hit bed       +2= Drift, hits bed       +2= Some effort against gravity       +3= No effort against gravity       +4= No movement       0= Amputation/joint fusion    7: Limb ataxia (FNF/heel-shin)       0= No ataxia       +1= Ataxia in 1 limb       +2= Ataxia in 2 limbs       0= Does not understand       0= Paralyzed       0= Amputation/joint fusion    8: Sensation       0= Normal, no sensory loss       +1= mild-moderate loss: less sharp/more dull       +1= mild-moderate loss: can sense being touched       +2= Complete loss: cannot sense being touched at all       +2= No response and quadriplegic       +2= Coma/unresponsive    9: Language/aphasia- describe the scene (on dennis); name the items; read the sentences (on dennis)       0= Normal, no aphasia       +1= mild-moderate aphaisa: some obvious changes without significant limitation       +2= Severe aphasia: fragmentary expression, inference needed, cannot identify materials       +3= Mute/global aphasia: no usable speech/auditory comprehension       +3= coma/unresponsive    10: Dysarthria- read the words       0= Normal       +1= mild-moderate dysarthria: slurring but can be understood       +2= Severe dysarthria: unintelligible slurring or out of proportion to dysphasia       +2= Mute/anarthric        0= Intubated/unable to test    11: Extinction/inattention       0= No abnormality       +1= visual/tactile/auditory/spatial/personal inattention       +1= Extinction to b/l simultaneous stimulation       +2= Profound shivam-inattention (e.g. does not recognize own hand)       +2= extinction to > 1 modality          A/P:   73 yo woman with acute COVID infection, reporting symptoms of a right hemiparesis since Friday, and exam suggestive of an acute ischemic stroke (apparent crossed findings of left facial weakness and right hemiparesis which might suggest brainstem infarct).    Recommend:  1. Aspirin 81mg daily  2. Intensive statin therapy, target LDL < 70   3. Can optimize BP control as patient is beyone 24-48 hrs from onset  4. MRI brain, MRA head/neck  5. TTE  6. Telemetry monitoring  7. PT/OT    Beni Tafoya MD  Neurology Attending Physician

## 2022-12-18 NOTE — CONSULT NOTE ADULT - ASSESSMENT
73 y/o Female with h/o Emphysema, Breast Ca s/p mastectomy, Melanoma, Ovarian tumor (benign, per pt), lumbar herniated disc, chronic back pain, neuropathy with chronic left foot drop, essential tremors was admitted on 12/17 for URI like symptoms. Reports of cough, congestion, generalized malaise. States symptoms started on 12/14. Patient also reports of generalized weakness, reports of perioral tingling. Reports of "curling" of the fingers in the right hand. Patient denies  headache, dizziness, change in vision, blurry vision, anosmia, ageusia, chest pain, palpitations, abdominal pain, nausea, vomiting, diarrhea, constipation, change in bowel movement, weight loss, dysuria, urinary frequency, urgency, hematuria, urinary/bowel incontinence or saddle anesthesia. In ER she was noted afebrile and an LP was performed as there was concern for ascending weakness/paralysis. A COVID-19 PCR test was detected.     1. COVID-19 viral syndrome. ?possible early multifocal pneumonia. Breast Ca. Melanoma. Ovarian tumor. COPD.  -obtain BC x 2, urine c/s  -agree with remdesivir protocol  -remdesivir risks and benefits reviewed with patient  -O2 therapy  -steroids  -AC  -droplet isolation  -respiratory care  -old chart reviewed to assess prior cultures  -monitor temps  -f/u CBC  -supportive care  2. Other issues:   -care per medicine     73 y/o Female with h/o Emphysema, Breast Ca s/p mastectomy, Melanoma, Ovarian tumor (benign, per pt), lumbar herniated disc, chronic back pain, neuropathy with chronic left foot drop, essential tremors was admitted on 12/17 for URI like symptoms. Reports of cough, congestion, generalized malaise. States symptoms started on 12/14. Patient also reports of generalized weakness, reports of perioral tingling. Reports of "curling" of the fingers in the right hand. Patient denies  headache, dizziness, change in vision, blurry vision, anosmia, ageusia, chest pain, palpitations, abdominal pain, nausea, vomiting, diarrhea, constipation, change in bowel movement, weight loss, dysuria, urinary frequency, urgency, hematuria, urinary/bowel incontinence or saddle anesthesia. In ER she was noted afebrile and an LP was performed as there was concern for ascending weakness/paralysis. A COVID-19 PCR test was detected.     1. COVID-19 viral syndrome. ?possible early multifocal pneumonia. Breast Ca. Melanoma. Ovarian tumor. COPD.  -difficulty walking  -neurological evaluation in progress  -obtain BC x 2, urine c/s  -agree with remdesivir protocol  -remdesivir risks and benefits reviewed with patient  -O2 therapy  -steroids  -AC  -droplet isolation  -respiratory care  -old chart reviewed to assess prior cultures  -monitor temps  -f/u CBC  -supportive care  2. Other issues:   -care per medicine

## 2022-12-18 NOTE — PATIENT PROFILE ADULT - FALL HARM RISK - HARM RISK INTERVENTIONS
Assistance with ambulation/Assistance OOB with selected safe patient handling equipment/Communicate Risk of Fall with Harm to all staff/Discuss with provider need for PT consult/Monitor gait and stability/Reinforce activity limits and safety measures with patient and family/Tailored Fall Risk Interventions/Visual Cue: Yellow wristband and red socks/Bed in lowest position, wheels locked, appropriate side rails in place/Call bell, personal items and telephone in reach/Instruct patient to call for assistance before getting out of bed or chair/Non-slip footwear when patient is out of bed/[place] to call system/Physically safe environment - no spills, clutter or unnecessary equipment/Purposeful Proactive Rounding/Room/bathroom lighting operational, light cord in reach

## 2022-12-19 ENCOUNTER — TRANSCRIPTION ENCOUNTER (OUTPATIENT)
Age: 72
End: 2022-12-19

## 2022-12-19 LAB
ANION GAP SERPL CALC-SCNC: 11 MMOL/L — SIGNIFICANT CHANGE UP (ref 5–17)
BUN SERPL-MCNC: 16 MG/DL — SIGNIFICANT CHANGE UP (ref 7–23)
CALCIUM SERPL-MCNC: 8.2 MG/DL — LOW (ref 8.5–10.1)
CHLORIDE SERPL-SCNC: 106 MMOL/L — SIGNIFICANT CHANGE UP (ref 96–108)
CHOLEST SERPL-MCNC: 127 MG/DL — SIGNIFICANT CHANGE UP
CO2 SERPL-SCNC: 21 MMOL/L — LOW (ref 22–31)
CREAT SERPL-MCNC: 0.55 MG/DL — SIGNIFICANT CHANGE UP (ref 0.5–1.3)
CRP SERPL-MCNC: 9 MG/L — HIGH
D DIMER BLD IA.RAPID-MCNC: <150 NG/ML DDU — SIGNIFICANT CHANGE UP
EGFR: 98 ML/MIN/1.73M2 — SIGNIFICANT CHANGE UP
ERYTHROCYTE [SEDIMENTATION RATE] IN BLOOD: 10 MM/HR — SIGNIFICANT CHANGE UP (ref 0–20)
FERRITIN SERPL-MCNC: 393 NG/ML — HIGH (ref 15–150)
GLUCOSE SERPL-MCNC: 95 MG/DL — SIGNIFICANT CHANGE UP (ref 70–99)
HCT VFR BLD CALC: 39.3 % — SIGNIFICANT CHANGE UP (ref 34.5–45)
HDLC SERPL-MCNC: 36 MG/DL — LOW
HGB BLD-MCNC: 13.6 G/DL — SIGNIFICANT CHANGE UP (ref 11.5–15.5)
LDH SERPL L TO P-CCNC: 199 U/L — SIGNIFICANT CHANGE UP (ref 84–241)
LIPID PNL WITH DIRECT LDL SERPL: 81 MG/DL — SIGNIFICANT CHANGE UP
MCHC RBC-ENTMCNC: 30.9 PG — SIGNIFICANT CHANGE UP (ref 27–34)
MCHC RBC-ENTMCNC: 34.6 GM/DL — SIGNIFICANT CHANGE UP (ref 32–36)
MCV RBC AUTO: 89.3 FL — SIGNIFICANT CHANGE UP (ref 80–100)
NON HDL CHOLESTEROL: 91 MG/DL — SIGNIFICANT CHANGE UP
PLATELET # BLD AUTO: 197 K/UL — SIGNIFICANT CHANGE UP (ref 150–400)
POTASSIUM SERPL-MCNC: 3.8 MMOL/L — SIGNIFICANT CHANGE UP (ref 3.5–5.3)
POTASSIUM SERPL-SCNC: 3.8 MMOL/L — SIGNIFICANT CHANGE UP (ref 3.5–5.3)
RBC # BLD: 4.4 M/UL — SIGNIFICANT CHANGE UP (ref 3.8–5.2)
RBC # FLD: 13.8 % — SIGNIFICANT CHANGE UP (ref 10.3–14.5)
SODIUM SERPL-SCNC: 138 MMOL/L — SIGNIFICANT CHANGE UP (ref 135–145)
TRIGL SERPL-MCNC: 51 MG/DL — SIGNIFICANT CHANGE UP
WBC # BLD: 4.74 K/UL — SIGNIFICANT CHANGE UP (ref 3.8–10.5)
WBC # FLD AUTO: 4.74 K/UL — SIGNIFICANT CHANGE UP (ref 3.8–10.5)

## 2022-12-19 PROCEDURE — 99233 SBSQ HOSP IP/OBS HIGH 50: CPT

## 2022-12-19 PROCEDURE — 99232 SBSQ HOSP IP/OBS MODERATE 35: CPT

## 2022-12-19 RX ORDER — CLOPIDOGREL BISULFATE 75 MG/1
75 TABLET, FILM COATED ORAL DAILY
Refills: 0 | Status: DISCONTINUED | OUTPATIENT
Start: 2022-12-19 | End: 2022-12-19

## 2022-12-19 RX ADMIN — PRIMIDONE 50 MILLIGRAM(S): 250 TABLET ORAL at 17:41

## 2022-12-19 RX ADMIN — Medication 1000 UNIT(S): at 08:48

## 2022-12-19 RX ADMIN — TRAMADOL HYDROCHLORIDE 50 MILLIGRAM(S): 50 TABLET ORAL at 00:15

## 2022-12-19 RX ADMIN — DULOXETINE HYDROCHLORIDE 60 MILLIGRAM(S): 30 CAPSULE, DELAYED RELEASE ORAL at 08:48

## 2022-12-19 RX ADMIN — PRIMIDONE 50 MILLIGRAM(S): 250 TABLET ORAL at 12:37

## 2022-12-19 RX ADMIN — Medication 600 MILLIGRAM(S): at 08:48

## 2022-12-19 RX ADMIN — Medication 81 MILLIGRAM(S): at 08:47

## 2022-12-19 RX ADMIN — TRAMADOL HYDROCHLORIDE 50 MILLIGRAM(S): 50 TABLET ORAL at 23:00

## 2022-12-19 RX ADMIN — PRIMIDONE 50 MILLIGRAM(S): 250 TABLET ORAL at 23:50

## 2022-12-19 RX ADMIN — REMDESIVIR 200 MILLIGRAM(S): 5 INJECTION INTRAVENOUS at 05:05

## 2022-12-19 RX ADMIN — ENOXAPARIN SODIUM 40 MILLIGRAM(S): 100 INJECTION SUBCUTANEOUS at 05:04

## 2022-12-19 RX ADMIN — ATORVASTATIN CALCIUM 80 MILLIGRAM(S): 80 TABLET, FILM COATED ORAL at 22:25

## 2022-12-19 RX ADMIN — PRIMIDONE 50 MILLIGRAM(S): 250 TABLET ORAL at 05:05

## 2022-12-19 RX ADMIN — ROPINIROLE 0.25 MILLIGRAM(S): 8 TABLET, FILM COATED, EXTENDED RELEASE ORAL at 08:48

## 2022-12-19 RX ADMIN — GABAPENTIN 300 MILLIGRAM(S): 400 CAPSULE ORAL at 22:25

## 2022-12-19 RX ADMIN — Medication 6 MILLIGRAM(S): at 08:49

## 2022-12-19 RX ADMIN — TRAMADOL HYDROCHLORIDE 50 MILLIGRAM(S): 50 TABLET ORAL at 22:25

## 2022-12-19 RX ADMIN — Medication 600 MILLIGRAM(S): at 22:25

## 2022-12-19 NOTE — PROGRESS NOTE ADULT - SUBJECTIVE AND OBJECTIVE BOX
CC: shortness of breath (18 Dec 2022 10:57)    Patient is a 71 y/o F with PMHx of Emphysema, Breast Ca s/p mastectomy, Melanoma, Ovarian tumor (benign, per pt) Hx of herniated disc, chronic back pain, neuropathy with chronic left foot drop, Essential Tremors presents to  c/o URI like symptoms. Reports of cough, congestion, genearlized malaise. States symptoms started on 12/14. Patient also reports of generalized weakness, reports of perioral tingling. Reports of "curling" of the fingers in the right hand. Patient reports Hx of multiple herniated discs in the "L region" and had MRI done last year at [facility]. Patient denies  headache, dizziness, change in vision, blurry vision, anosmia, ageusia, chest pain, palpitations, shortness of breath, abdominal pain, nausea, vomiting, diarrhea, constipation, change in bowel movement, weight loss, dysuria, urinary frequency, urgency, hematuria, urinary/bowel incontinence or saddle anesthesia.     Patient is comfortably resting. No noted facial griemace. patient's  asked me to come back as patient is comfortably sleeping. Labs and vitals reviewed. Care discussed with PT -  OT / Acute rehab evaluation    REVIEW OF SYSTEMS:  All other review of systems is negative unless indicated above.    PHYSICAL EXAM:  ICU Vital Signs Last 24 Hrs  T(C): 36.6 (19 Dec 2022 08:15), Max: 36.7 (18 Dec 2022 17:23)  T(F): 97.8 (19 Dec 2022 08:15), Max: 98 (18 Dec 2022 17:23)  HR: 73 (19 Dec 2022 08:15) (73 - 88)  BP: 101/58 (19 Dec 2022 08:15) (101/58 - 120/67)  RR: 18 (19 Dec 2022 08:15) (18 - 18)  SpO2: 93% (19 Dec 2022 08:15) (93% - 94%)  General: Deconditioned, frail.   Eyes: PERRLA, EOMI; conjunctiva and sclera clear  Head: Normocephalic; atraumatic  ENMT: No nasal discharge; airway clear  Neck: Supple; non tender; no masses  Respiratory: No wheezes, rales or rhonchi  Cardiovascular: Regular rate and rhythm. S1 and S2 Normal; No murmurs, gallops or rubs  Gastrointestinal: Soft non-tender non-distended; Normal bowel sounds  Genitourinary: No  suprapubic  tenderness  Extremities: Normal range of motion, No clubbing, cyanosis or edema  Vascular: Peripheral pulses palpable 2+ bilaterally  Neurological: Alert and oriented x4  Skin: Warm and dry. No acute rash  Lymph Nodes: No acute cervical adenopathy  Musculoskeletal: Normal muscle tone, without deformities  Psychiatric: Cooperative and appropriate      CBC Full  -  ( 19 Dec 2022 07:23 )  WBC Count : 4.74 K/uL  RBC Count : 4.40 M/uL  Hemoglobin : 13.6 g/dL  Hematocrit : 39.3 %  Platelet Count - Automated : 197 K/uL  Mean Cell Volume : 89.3 fl  Mean Cell Hemoglobin : 30.9 pg  Mean Cell Hemoglobin Concentration : 34.6 gm/dL  Auto Neutrophil # : x  Auto Lymphocyte # : x  Auto Monocyte # : x  Auto Eosinophil # : x  Auto Basophil # : x  Auto Neutrophil % : x  Auto Lymphocyte % : x  Auto Monocyte % : x  Auto Eosinophil % : x  Auto Basophil % : x        12-19    138  |  106  |  16  ----------------------------<  95  3.8   |  21<L>  |  0.55    Ca    8.2<L>      19 Dec 2022 07:23    TPro  6.4  /  Alb  3.1<L>  /  TBili  0.4  /  DBili  <0.1  /  AST  40<H>  /  ALT  27  /  AlkPhos  61  12-19      71 y/o F with PMHx of Emphysema, Breast Ca s/p mastectomy, Melanoma, Ovarian tumor (benign, per pt) Hx of herniated disc, chronic back pain, neuropathy with chronic left foot drop, Essential Tremors admitted for:         # COVID-19  URI   - No respiratory failure, Pts pulse ox above 90%, tolerates RA  - Monitor pulse ox, supplemental oxygen PRN with target SpO2>92  - Started on Remdesvir + Decadron, will consider 3  days course unless will become hypoxic   - C/w albuterol  PRN   - antipyretics and antiemetics PRN   - check proinflammatory markers   - D/w Dr Mehta    # Hyponatremia   - Na -  138    # Worsening LLE weakness,  new RLE/ R hand weakness with L facial droop - pending ruled out stroke  - s/p LP in ED   - CSF studies d/w neuro,  not suggestive of infection or GBS, will need to r/o stroke   - NIHSS 4   - neurochecks q4   - C/w ASA  - C/w Lipitor   - MRI/MRA ordered   - Speech and swallow eval     # Hypokalemia  - replace PO    # Depression. Neuropathy,  foot drop   -Duloxetine   -Gabapentin   -Ropinirole    #Emphysema, stable   -on Anoro Ellipta     #Essential Tremor   -Primidone     #Insomnia   -Zolpidem     #DVT Prophylaxis   -Lovenox     #DIET   -DASH/TLC

## 2022-12-19 NOTE — PHYSICAL THERAPY INITIAL EVALUATION ADULT - PERSONAL SAFETY AND JUDGMENT, REHAB EVAL
Vascular Surgery Progress Note    Pt is being seen in f/u today regarding history of vascular disease status post intervention    Subjective:  Andrade Arana is a 79 y.o. male status post intervention of her right critical limb ischemia. Overall he states he thinks he has more feeling in his foot as well as an overall feeling of increased warmth the right foot. He denies any active chest pain or shortness of breath he denies any access site complications. Current Medications:    dextrose        sodium chloride flush, magnesium hydroxide, ondansetron, acetaminophen, glucose, dextrose, glucagon (rDNA), dextrose    clopidogrel  75 mg Oral Daily    lisinopril  10 mg Oral Daily    tamsulosin  0.4 mg Oral Nightly    meropenem  1 g Intravenous Q8H    sodium chloride flush  10 mL Intravenous 2 times per day    enoxaparin  40 mg Subcutaneous Daily    insulin lispro  0-12 Units Subcutaneous TID WC    insulin lispro  0-6 Units Subcutaneous Nightly        PHYSICAL EXAM:    BP (!) 180/80   Pulse 69   Temp 97.1 °F (36.2 °C) (Temporal)   Resp 18   Ht 5' 10\" (1.778 m)   Wt 140 lb (63.5 kg)   SpO2 96%   BMI 20.09 kg/m²     Intake/Output Summary (Last 24 hours) at 4/24/2019 2126  Last data filed at 4/24/2019 2048  Gross per 24 hour   Intake 700 ml   Output 1000 ml   Net -300 ml          General: Awake alert answers questions appropriately currently no acute distress   Skin: Dry no changes in turgor no jaundice no sclera icterus. HEENT: Normocephalic atraumatic trachea is midline no jugular venous distention  CVS: Currently regular rate and rhythm no murmur rub or gallop  Resp: Clear to auscultation bilaterally no wheezes rales or rhonchi   Abd: Soft nontender no rebound or guarding. Access site is unremarkable  Extremities: Motor and sensation are intact bilaterally. Capillary refill on the right side is less than 2 seconds on the left side is also less than 2 seconds.  He's currently bandaged with his right lower extremity. And he is currently eating so I did not take down any of his dressings. Neuro: Grossly intact bilaterally. LABS:    Lab Results   Component Value Date    WBC 9.0 04/24/2019    HGB 9.8 (L) 04/24/2019    HCT 30.7 (L) 04/24/2019     04/24/2019    PROTIME 14.7 (H) 04/22/2019    INR 1.3 04/22/2019    APTT 29.8 04/23/2019    K 3.9 04/24/2019    BUN 15 04/24/2019    CREATININE 0.8 04/24/2019       RADIOLOGY:  No orders to display       ASSESSMENT/PLAN:   · #1 history of peripheral vascular disease with intervention and below-knee popliteal artery occlusion with tibial vessel disease status post balloon angioplasty. · Currently his foot is perfused. He's on antiplatelet therapy with Plavix. This will be maintained I will defer to podiatry for any additional treatment at this point.         Electronically signed by Maurice Belcher MD on 4/24/2019 at 9:26 PM intact

## 2022-12-19 NOTE — PHYSICAL THERAPY INITIAL EVALUATION ADULT - LEVEL OF INDEPENDENCE: GAIT, REHAB EVAL
Able to take one step up the bed with (+) R knee buckling.  Patient c/o nausea, refused further trials.

## 2022-12-19 NOTE — OCCUPATIONAL THERAPY INITIAL EVALUATION ADULT - PRECAUTIONS/LIMITATIONS, REHAB EVAL
diet: regular, airborne/droplet: COVID (+), bedrest order/isolation precautions diet: regular, airborne/droplet: COVID (+), bedrest order (spoke with Dr. Dan to update- reports okay for patient to be out of bed)/isolation precautions

## 2022-12-19 NOTE — SWALLOW BEDSIDE ASSESSMENT ADULT - SWALLOW EVAL: DIAGNOSIS
oral-pharyngeal swallow function within normal limits for regular consistency. .Given left essential tremor, and now right sided weakness, pt will need help with set u and with opening the containers.

## 2022-12-19 NOTE — OCCUPATIONAL THERAPY INITIAL EVALUATION ADULT - GENERAL OBSERVATIONS, REHAB EVAL
Patient rec'd Patient rec'd semi-supine in bed, bed alarmed, NAD,  present, slight L facial droop noted, +prima fit, +CM, lines intact, agreeable to OT IE, CB/TT/phone IR.

## 2022-12-19 NOTE — PHYSICAL THERAPY INITIAL EVALUATION ADULT - MODALITIES TREATMENT COMMENTS
Patient able to stand x 2 then c/o nausea, refused further attempts. Patient returned to bed by request, call bell in reach and bed alarm active. JOSE, RN informed of session/status.

## 2022-12-19 NOTE — PHYSICAL THERAPY INITIAL EVALUATION ADULT - PLANNED THERAPY INTERVENTIONS, PT EVAL
today: therapeutic exercises x 12 min, gait training x 12 min/bed mobility training/gait training/strengthening/transfer training

## 2022-12-19 NOTE — PHYSICAL THERAPY INITIAL EVALUATION ADULT - PATIENT PROFILE REVIEW, REHAB EVAL
PMHx of Emphysema, Breast Ca s/p mastectomy, Melanoma, Ovarian tumor (benign, per pt) Hx of herniated disc, chronic back pain, neuropathy with chronic left foot drop, Essential Tremors/yes

## 2022-12-19 NOTE — OCCUPATIONAL THERAPY INITIAL EVALUATION ADULT - ADDITIONAL COMMENTS
Patient resides in a  with her , Patient resides in a Condo with her , ~8 steps + HR-->landing-->~8 more steps +HR, FOS down to basement (her sewing room is here), walk in shower stall with built in seat, standard toilet. Patient reports being (I) w/ ADL, IADL tasks PTA, walked without a AD. LHD, decreased hearing L ear, reading glasses, drives.

## 2022-12-19 NOTE — OCCUPATIONAL THERAPY INITIAL EVALUATION ADULT - BALANCE TRAINING, PT EVAL
Pt will demonstrate improved static/dynamic balance by 1/2 grade in order to increase safety and independence in higher level ADLs within 4 weeks

## 2022-12-19 NOTE — OCCUPATIONAL THERAPY INITIAL EVALUATION ADULT - TRANSFER TRAINING, PT EVAL
Patient will improve toilet/commode transfers to minimal assistance x 1 in 2 weeks using necessary DME/AE.

## 2022-12-19 NOTE — PROGRESS NOTE ADULT - SUBJECTIVE AND OBJECTIVE BOX
Interval History:  12/19/22: States she is starting to feel better.    MEDICATIONS  (STANDING):  aspirin enteric coated 81 milliGRAM(s) Oral daily  atorvastatin 80 milliGRAM(s) Oral at bedtime  cholecalciferol 1000 Unit(s) Oral daily  dexAMETHasone  Injectable 6 milliGRAM(s) IV Push daily  DULoxetine 60 milliGRAM(s) Oral daily  enoxaparin Injectable 40 milliGRAM(s) SubCutaneous every 24 hours  gabapentin 300 milliGRAM(s) Oral at bedtime  guaiFENesin  milliGRAM(s) Oral every 12 hours  primidone 50 milliGRAM(s) Oral four times a day  remdesivir  IVPB   IV Intermittent   remdesivir  IVPB 100 milliGRAM(s) IV Intermittent every 24 hours  rOPINIRole 0.25 milliGRAM(s) Oral daily  tiotropium 2.5 MICROgram(s) Inhaler 2 Puff(s) Inhalation daily    MEDICATIONS  (PRN):  albuterol    90 MICROgram(s) HFA Inhaler 2 Puff(s) Inhalation every 6 hours PRN Shortness of Breath and/or Wheezing  traMADol 50 milliGRAM(s) Oral every 6 hours PRN Moderate Pain (4 - 6)  zolpidem 5 milliGRAM(s) Oral at bedtime PRN Insomnia  zolpidem 5 milliGRAM(s) Oral at bedtime PRN Insomnia      Allergies    No Known Allergies    Intolerances        PHYSICAL EXAM:  Vital Signs Last 24 Hrs  T(F): 97.8 (12-19-22 @ 08:15)  HR: 73 (12-19-22 @ 08:15)  BP: 101/58 (12-19-22 @ 08:15)  RR: 18 (12-19-22 @ 08:15)    GENERAL: NAD, well-groomed  HEAD:  Atraumatic, Normocephalic  HF: A x O x 3. Appropriately interactive, normal affect. Speech fluent, No Aphasia or paraphasic errors. Naming /repetition intact   CN: ADAN, EOMI, VFF, facial sensation normal, +flattened left NL fold, tongue midline, Palate moves equally, SCM equal bilaterally  Motor: 4/5 right hand , +right pronator drift, RLE 3/5, LUE 5/5, LLE 5/5 proximally, 2/5 left foot dorsiflexion (chronic)  Sens: Intact to light touch   Reflexes: 2+ in LUE/LLE (except for 0 ankle jerk), relatively brisk in RUE/RLE, downgoing toe on left, mute toe on right  Coord:  No dysmetria        1A: Level of consciousness       0= Alert; keenly responsive       +1= Arouses to minor stimulation       +2= Requires repeated stimulation to arouse       +2= Movements to pain       +3= Postures or unresponsive    1B: Ask month and age       0= Both questions right       +1= 1 question right       +2= 0 questions right       +1= Dysarthric/intubated/trauma/language barrier       +2= Aphasic    1C: "Blink eyes" and "Squeeze Hands"       0= Performs both       +1= Performs 1 task       +2= Performs 0 tasks    2: Horizontal EOMs       0= Normal       +1= Partial gaze palsy: can be overcome       +1= Partial gaze palsy: corrects w/ oculocephalic reflex        +2= Forzed gaze palsy: cannot be overcome    3: Visual fields       0= No visual loss       +1= Partial hemianopia       +2= Complete hemianopia       +3= Patient is b/l blind       +3= B/l hemianopia    4: Facial palsy (use grimace if obtunded)       0= Normal symmetry       +1= Minor paralysis (flat NLF, smile asymmetry)       +2= Partial paralysis ( lower face)       +3= Unilateral complete paralysis (upper/lower face)       +3= B/l complete paralysis (upper/lower face)    5A: Left arm motor drift (count out loud and use fingers to show count)       0= No drift x 10 seconds       +1= Drift but doesn't hit bed       +2= Drift, hits bed       +2= Some effort against gravity       +3= No effort against gravity       +4= No movement       0= Amputation/joint fusion    5B: Right arm motor drift       0= No drift x 10 seconds       +1= Drift but doesn't hit bed       +2= Drift, hits bed       +2= Some effort against gravity       +3= No effort against gravity       +4= No movement       0= Amputation/joint fusion    6A: Left leg motor drift       0= No drift x 10 seconds       +1= Drift but doesn't hit bed       +2= Drift, hits bed       +2= Some effort against gravity       +3= No effort against gravity       +4= No movement       0= Amputation/joint fusion    6B: Right leg motor drift       0= No drift x 10 seconds       +1= Drift but doesn't hit bed       +2= Drift, hits bed       +2= Some effort against gravity       +3= No effort against gravity       +4= No movement       0= Amputation/joint fusion    7: Limb ataxia (FNF/heel-shin)       0= No ataxia       +1= Ataxia in 1 limb       +2= Ataxia in 2 limbs       0= Does not understand       0= Paralyzed       0= Amputation/joint fusion    8: Sensation       0= Normal, no sensory loss       +1= mild-moderate loss: less sharp/more dull       +1= mild-moderate loss: can sense being touched       +2= Complete loss: cannot sense being touched at all       +2= No response and quadriplegic       +2= Coma/unresponsive    9: Language/aphasia- describe the scene (on dennis); name the items; read the sentences (on dennis)       0= Normal, no aphasia       +1= mild-moderate aphasia: some obvious changes without significant limitation       +2= Severe aphasia: fragmentary expression, inference needed, cannot identify materials       +3= Mute/global aphasia: no usable speech/auditory comprehension       +3= coma/unresponsive    10: Dysarthria- read the words       0= Normal       +1= mild-moderate dysarthria: slurring but can be understood       +2= Severe dysarthria: unintelligible slurring or out of proportion to dysphasia       +2= Mute/anarthric        0= Intubated/unable to test    11: Extinction/inattention       0= No abnormality       +1= visual/tactile/auditory/spatial/personal inattention       +1= Extinction to b/l simultaneous stimulation       +2= Profound shivam-inattention (e.g. does not recognize own hand)       +2= extinction to > 1 modality      Total 3      LABS:                        13.6   4.74  )-----------( 197      ( 19 Dec 2022 07:23 )             39.3     12-19    138  |  106  |  16  ----------------------------<  95  3.8   |  21<L>  |  0.55    Ca    8.2<L>      19 Dec 2022 07:23    TPro  6.4  /  Alb  3.1<L>  /  TBili  0.4  /  DBili  <0.1  /  AST  40<H>  /  ALT  27  /  AlkPhos  61  12-19          RADIOLOGY & ADDITIONAL STUDIES:  CT head 12/17/22:  No acute intracranial hemorrhage, vasogenic edema, extra-axial collection   or hydrocephalus. Tiny chronic lacunar infarcts head of the right caudate   nucleus and right corona radiata.

## 2022-12-19 NOTE — PHYSICAL THERAPY INITIAL EVALUATION ADULT - NSPTDISCHREC_GEN_A_CORE
Patient would benefit from an acute rehab facility which includes  PT, OT and Speech therapy for a total of 3 hours per day./Acute Inpatient Rehab

## 2022-12-19 NOTE — OCCUPATIONAL THERAPY INITIAL EVALUATION ADULT - ADL RETRAINING, OT EVAL
Patient will improve LBD using AE prn to minimal assistance in 2 weeks. Patient will improve toileting to minimal assistance in 2 weeks using DME/AE prn.

## 2022-12-19 NOTE — OCCUPATIONAL THERAPY INITIAL EVALUATION ADULT - HOME MANAGEMENT SKILLS, PREVIOUS LEVEL OF FUNCTION, OT EVAL
independent
Anus position normal and patency confirmed, rectal-cutaneous fistula absent, normal anal wink.

## 2022-12-19 NOTE — SWALLOW BEDSIDE ASSESSMENT ADULT - COMMENTS
71 y/o F with PMHx of Emphysema, Breast Ca s/p mastectomy, Melanoma, Ovarian tumor (benign, per pt) Hx of herniated disc, chronic back pain, neuropathy with chronic left foot drop, Essential Tremors presents to  c/o URI like symptoms. Reports of cough, congestion, genearlized malaise. States symptoms started on 12/14. Patient also reports of generalized weakness, reports of perioral tingling. Reports of "curling" of the fingers in the right hand. Patient reports Hx of multiple herniated discs in the "L region" and had MRI done last year at [facility]. Patient denies  headache, dizziness, change in vision, blurry vision, anosmia, ageusia, chest pain, palpitations, shortness of breath, abdominal pain, nausea, vomiting, diarrhea, constipation, change in bowel movement, weight loss, dysuria, urinary frequency, urgency, hematuria,    Pt is seen by Service for po intake: suspected stroke 2/2 weakness of right arm and hand.  Note Pt has essential tremor on left hand.

## 2022-12-19 NOTE — OCCUPATIONAL THERAPY INITIAL EVALUATION ADULT - MD ORDER
"OT Evaluate and Treat"- MD orders received. Chart reviewed, contents noted, conferred with RN. "OT Evaluate and Treat"- MD orders received. Chart reviewed, contents noted, conferred with RN DIANNE Parkinson (101/53, HR 65, O2- 93% on RA, 0/10 pain).

## 2022-12-19 NOTE — SWALLOW BEDSIDE ASSESSMENT ADULT - NS SPL SWALLOW CLINIC TRIAL FT
Rx: Maintain regular consistency> pt presents with no contraindication for regular consistency at this itme. Gena with set up and opening the containers.  Service will not follow.  Please re-consult prn.

## 2022-12-19 NOTE — OCCUPATIONAL THERAPY INITIAL EVALUATION ADULT - STRENGTHENING, PT EVAL
Patient will improve R wrist/hand strength, and L shoulder strength by 1/2 grade in 2 weeks to facilitate improved independence and safety with self-care ADLs.

## 2022-12-19 NOTE — PHYSICAL THERAPY INITIAL EVALUATION ADULT - GENERAL OBSERVATIONS, REHAB EVAL
Patient received in bed on 3N, +Airborne Precautions for CV-19, +HM. No O2 in use, +PrimaFit.  Patient denied pain.

## 2022-12-19 NOTE — SWALLOW BEDSIDE ASSESSMENT ADULT - SLP GENERAL OBSERVATIONS
pt seated in bed, awake and alert, verbally conversant and appropriate . able t explain hx and events.

## 2022-12-19 NOTE — PHYSICAL THERAPY INITIAL EVALUATION ADULT - MANUAL MUSCLE TESTING RESULTS, REHAB EVAL
except: L shoulder FF: 3-/5; R wrist and hand: 2/5. R quads: 2/5.  Chronic L drop foot. (pt reports not using her brace at home)./no strength deficits were identified

## 2022-12-19 NOTE — PROGRESS NOTE ADULT - SUBJECTIVE AND OBJECTIVE BOX
Date of service: 12-19-22 @ 12:28    Lying in bed in NAD  Has dry cough  Difficulty walking  No fever    ROS: no fever or chills; denies dizziness, no HA, no abdominal pain, no diarrhea or constipation; no dysuria, no legs pain, no rashes    MEDICATIONS  (STANDING):  aspirin enteric coated 81 milliGRAM(s) Oral daily  atorvastatin 80 milliGRAM(s) Oral at bedtime  cholecalciferol 1000 Unit(s) Oral daily  dexAMETHasone  Injectable 6 milliGRAM(s) IV Push daily  DULoxetine 60 milliGRAM(s) Oral daily  enoxaparin Injectable 40 milliGRAM(s) SubCutaneous every 24 hours  gabapentin 300 milliGRAM(s) Oral at bedtime  guaiFENesin  milliGRAM(s) Oral every 12 hours  primidone 50 milliGRAM(s) Oral four times a day  remdesivir  IVPB   IV Intermittent   remdesivir  IVPB 100 milliGRAM(s) IV Intermittent every 24 hours  rOPINIRole 0.25 milliGRAM(s) Oral daily  tiotropium 2.5 MICROgram(s) Inhaler 2 Puff(s) Inhalation daily    Vital Signs Last 24 Hrs  T(C): 36.6 (19 Dec 2022 08:15), Max: 36.7 (18 Dec 2022 17:23)  T(F): 97.8 (19 Dec 2022 08:15), Max: 98 (18 Dec 2022 17:23)  HR: 73 (19 Dec 2022 08:15) (73 - 88)  BP: 101/58 (19 Dec 2022 08:15) (101/58 - 120/67)  BP(mean): --  RR: 18 (19 Dec 2022 08:15) (18 - 18)  SpO2: 93% (19 Dec 2022 08:15) (93% - 94%)    Parameters below as of 19 Dec 2022 08:15  Patient On (Oxygen Delivery Method): room air         Physical exam:    Constitutional:  No acute distress  HEENT: NC/AT, EOMI, PERRLA, conjunctivae clear; ears and nose atraumatic  Neck: supple; thyroid not palpable  Back: no tenderness  Respiratory: respiratory effort normal; crackles at bases  Cardiovascular: S1S2 regular, no murmurs  Abdomen: soft, not tender, not distended, positive BS  Genitourinary: no suprapubic tenderness  Lymphatic: no LN palpable  Musculoskeletal: no muscle tenderness, no joint swelling or tenderness  Extremities: no pedal edema  Neurological/ Psychiatric: AxOx3, judgement and insight normal; moving all extremities; gait weakness  Skin: no rashes; no palpable lesions    Labs: reviewed                        13.6   4.74  )-----------( 197      ( 19 Dec 2022 07:23 )             39.3     12-19    138  |  106  |  16  ----------------------------<  95  3.8   |  21<L>  |  0.55    Ca    8.2<L>      19 Dec 2022 07:23    TPro  6.4  /  Alb  3.1<L>  /  TBili  0.4  /  DBili  <0.1  /  AST  40<H>  /  ALT  27  /  AlkPhos  61  12-19    D-Dimer Assay, Quantitative: <150 ng/mL DDU (12-19-22 @ 07:23)  C-Reactive Protein, Serum: 9 mg/L (12-19-22 @ 07:23)  Ferritin, Serum: 393 ng/mL (12-19-22 @ 07:23)                        13.9   3.54  )-----------( 173      ( 17 Dec 2022 20:01 )             40.2     12-18    x   |  x   |  x   ----------------------------<  x   x    |  x   |  0.59    Ca    8.3<L>      17 Dec 2022 20:01    TPro  6.5  /  Alb  3.2<L>  /  TBili  0.4  /  DBili  <0.1  /  AST  33  /  ALT  25  /  AlkPhos  62  12-18     LIVER FUNCTIONS - ( 18 Dec 2022 08:14 )  Alb: 3.2 g/dL / Pro: 6.5 gm/dL / ALK PHOS: 62 U/L / ALT: 25 U/L / AST: 33 U/L / GGT: x             Culture - CSF with Gram Stain (collected 17 Dec 2022 22:24)  Source: .CSF  Gram Stain (18 Dec 2022 06:18):    No polymorphonuclear leukocytes seen    No organisms seen    by cytocentrifuge    Radiology: all available radiological tests reviewed    < from: CT Head No Cont (12.17.22 @ 23:20) >  No acute intracranial hemorrhage, vasogenic edema, extra-axial collection   or hydrocephalus. Tiny chronic lacunar infarcts head of the right caudate   nucleus and right corona radiata.  < end of copied text >    Advanced directives addressed: full resuscitation

## 2022-12-19 NOTE — OCCUPATIONAL THERAPY INITIAL EVALUATION ADULT - NSACTIVITYREC_GEN_A_OT
Patient educated on purpose of OT, compensatory techniques and safety with ADL tasks. Patient provided with HEP and provided with yellow foam block to promote improved strength/ROM in b/l UE's, recommend patient to complete exercises as outline on HEP. Pt presents with impaired balance, endurance and muscle strength and will benefit from skilled OT to improve independence in ADLs, reduce fall risk and chance for readmission.

## 2022-12-19 NOTE — PHYSICAL THERAPY INITIAL EVALUATION ADULT - PERTINENT HX OF CURRENT PROBLEM, REHAB EVAL
71 yo F admitted with c/o URI like symptoms. Reports of cough, congestion, generalized malaise. States symptoms started on 12/14. Patient also reports of generalized weakness, reports of perioral tingling. Reports of "curling" of the fingers in the right hand. Patient reports Hx of multiple herniated discs in the "L region"  CT head (-) acute changes., MRI ordered. 73 yo F admitted with c/o URI like symptoms. Reports of cough, congestion, generalized malaise. States symptoms started on 12/14. Patient also reports of generalized weakness, reports of perioral tingling. Reports of "curling" of the fingers in the right hand. Patient reports Hx of multiple herniated discs in the "L region"  LP done in ED to r/o Guillain-Barré Syndrome.  CT head (-) acute changes., MRI ordered.

## 2022-12-19 NOTE — PHYSICAL THERAPY INITIAL EVALUATION ADULT - NSACTIVITYREC_GEN_A_PT
Maximal Assistance Lift, such as Chung, Candice Flex or Candice Stedy, is recommended for OOB transfers for safe staff and patient handling.

## 2022-12-19 NOTE — OCCUPATIONAL THERAPY INITIAL EVALUATION ADULT - MANUAL MUSCLE TESTING RESULTS, REHAB EVAL
RUE: SF 4/5, elbow 4/5, wrist extension 2-/5, wrist flexion 3/5, hand: grasp decreased. LUE: SF 3- to 3/5 (initially only AROM to 90 degrees then with extensive time full ROM), elbow 4/5, wrist 4/5, hand WFL

## 2022-12-19 NOTE — PHYSICAL THERAPY INITIAL EVALUATION ADULT - ASR WT BEARING STATUS EVAL
+BEDREST orders, spoke with Sy Campbell to update.  OK for patient to be out of bed./no weight-bearing restrictions

## 2022-12-19 NOTE — OCCUPATIONAL THERAPY INITIAL EVALUATION ADULT - RANGE OF MOTION EXAMINATION
RUE: shoulder WFL, elbow WFL, wrist flexion WFL, wrist extension impaired- slight AROM/PROM WFL, hand- AROM WFL +increased time, weakness noted. LUE: shoulder WFL with increased time, elbow/wrist/hand WFL

## 2022-12-19 NOTE — OCCUPATIONAL THERAPY INITIAL EVALUATION ADULT - PERTINENT HX OF CURRENT PROBLEM, REHAB EVAL
Patient is a 71 y/o female w/ PMHx of Emphysema, Breast Ca s/p mastectomy, Melanoma, Ovarian tumor (benign, per pt) Hx of herniated disc, chronic back pain, neuropathy with chronic left foot drop, Essential Tremors is admitted with c/o URI like symptoms. Reports of cough, congestion, generalized malaise. Per H&P- states symptoms started on 12/14. Patient also reports of generalized weakness, reports of perioral tingling. Reports of "curling" of the fingers in the right hand. Patient reports Hx of multiple herniated discs in the "L region"  LP done in ED to r/o Guillain-Barré Syndrome. CT head (-) acute changes., MRI ordered.

## 2022-12-20 LAB
ALBUMIN SERPL ELPH-MCNC: 3 G/DL — LOW (ref 3.3–5)
ALP SERPL-CCNC: 58 U/L — SIGNIFICANT CHANGE UP (ref 40–120)
ALT FLD-CCNC: 27 U/L — SIGNIFICANT CHANGE UP (ref 12–78)
ANION GAP SERPL CALC-SCNC: 7 MMOL/L — SIGNIFICANT CHANGE UP (ref 5–17)
AST SERPL-CCNC: 39 U/L — HIGH (ref 15–37)
BILIRUB SERPL-MCNC: 0.3 MG/DL — SIGNIFICANT CHANGE UP (ref 0.2–1.2)
BUN SERPL-MCNC: 22 MG/DL — SIGNIFICANT CHANGE UP (ref 7–23)
CALCIUM SERPL-MCNC: 8.2 MG/DL — LOW (ref 8.5–10.1)
CHLORIDE SERPL-SCNC: 106 MMOL/L — SIGNIFICANT CHANGE UP (ref 96–108)
CO2 SERPL-SCNC: 25 MMOL/L — SIGNIFICANT CHANGE UP (ref 22–31)
CREAT SERPL-MCNC: 0.61 MG/DL — SIGNIFICANT CHANGE UP (ref 0.5–1.3)
EGFR: 96 ML/MIN/1.73M2 — SIGNIFICANT CHANGE UP
GLUCOSE SERPL-MCNC: 94 MG/DL — SIGNIFICANT CHANGE UP (ref 70–99)
HCT VFR BLD CALC: 39 % — SIGNIFICANT CHANGE UP (ref 34.5–45)
HGB BLD-MCNC: 13.1 G/DL — SIGNIFICANT CHANGE UP (ref 11.5–15.5)
MCHC RBC-ENTMCNC: 30.3 PG — SIGNIFICANT CHANGE UP (ref 27–34)
MCHC RBC-ENTMCNC: 33.6 GM/DL — SIGNIFICANT CHANGE UP (ref 32–36)
MCV RBC AUTO: 90.1 FL — SIGNIFICANT CHANGE UP (ref 80–100)
PLATELET # BLD AUTO: 200 K/UL — SIGNIFICANT CHANGE UP (ref 150–400)
POTASSIUM SERPL-MCNC: 4.2 MMOL/L — SIGNIFICANT CHANGE UP (ref 3.5–5.3)
POTASSIUM SERPL-SCNC: 4.2 MMOL/L — SIGNIFICANT CHANGE UP (ref 3.5–5.3)
PROT SERPL-MCNC: 6.2 GM/DL — SIGNIFICANT CHANGE UP (ref 6–8.3)
RBC # BLD: 4.33 M/UL — SIGNIFICANT CHANGE UP (ref 3.8–5.2)
RBC # FLD: 13.8 % — SIGNIFICANT CHANGE UP (ref 10.3–14.5)
SODIUM SERPL-SCNC: 138 MMOL/L — SIGNIFICANT CHANGE UP (ref 135–145)
WBC # BLD: 6.5 K/UL — SIGNIFICANT CHANGE UP (ref 3.8–10.5)
WBC # FLD AUTO: 6.5 K/UL — SIGNIFICANT CHANGE UP (ref 3.8–10.5)

## 2022-12-20 PROCEDURE — 99233 SBSQ HOSP IP/OBS HIGH 50: CPT

## 2022-12-20 PROCEDURE — 99221 1ST HOSP IP/OBS SF/LOW 40: CPT

## 2022-12-20 PROCEDURE — 99232 SBSQ HOSP IP/OBS MODERATE 35: CPT

## 2022-12-20 RX ADMIN — PRIMIDONE 50 MILLIGRAM(S): 250 TABLET ORAL at 12:04

## 2022-12-20 RX ADMIN — PRIMIDONE 50 MILLIGRAM(S): 250 TABLET ORAL at 06:07

## 2022-12-20 RX ADMIN — Medication 600 MILLIGRAM(S): at 08:53

## 2022-12-20 RX ADMIN — ATORVASTATIN CALCIUM 80 MILLIGRAM(S): 80 TABLET, FILM COATED ORAL at 21:47

## 2022-12-20 RX ADMIN — DULOXETINE HYDROCHLORIDE 60 MILLIGRAM(S): 30 CAPSULE, DELAYED RELEASE ORAL at 08:53

## 2022-12-20 RX ADMIN — PRIMIDONE 50 MILLIGRAM(S): 250 TABLET ORAL at 17:40

## 2022-12-20 RX ADMIN — REMDESIVIR 200 MILLIGRAM(S): 5 INJECTION INTRAVENOUS at 06:07

## 2022-12-20 RX ADMIN — TIOTROPIUM BROMIDE 2 PUFF(S): 18 CAPSULE ORAL; RESPIRATORY (INHALATION) at 09:04

## 2022-12-20 RX ADMIN — GABAPENTIN 300 MILLIGRAM(S): 400 CAPSULE ORAL at 21:47

## 2022-12-20 RX ADMIN — Medication 600 MILLIGRAM(S): at 21:47

## 2022-12-20 RX ADMIN — Medication 1000 UNIT(S): at 08:52

## 2022-12-20 RX ADMIN — ENOXAPARIN SODIUM 40 MILLIGRAM(S): 100 INJECTION SUBCUTANEOUS at 06:07

## 2022-12-20 RX ADMIN — ROPINIROLE 0.25 MILLIGRAM(S): 8 TABLET, FILM COATED, EXTENDED RELEASE ORAL at 08:53

## 2022-12-20 RX ADMIN — Medication 6 MILLIGRAM(S): at 08:56

## 2022-12-20 RX ADMIN — Medication 81 MILLIGRAM(S): at 08:52

## 2022-12-20 NOTE — CONSULT NOTE ADULT - ASSESSMENT
ASSESSMENT/PLAN  72yFemale with functional deficits after  Pain - Tylenol  DVT PPX - SCDs  Rehab -    Recommend ACUTE inpatient rehabilitation for the functional deficits consisting of 3 hours of therapy/day & 24 hour RN/daily PMR physician for comorbid medical management. Patient will be able to tolerate 3 hours a day.   Recommend AC, patient DOES NOT meet acute inpatient rehabilitation criteria. Patient needs a more prolonged stay to achieve transition to community.    Expect patient to achieve functional goals for DC HOME with OUTPATIENT   Expect patient to achieve functional goals for DC HOME with HOME CARE   Follow up with CONCUSSION PROGRAM - Call [phone] for an appointment    Will continue to follow. Functional progress will determine ongoing rehab dispo recommendations, which may change.    Continue bedside therapy as well as OOB throughout the day with mobilization by staff to maintain cardiopulmonary function and prevention of secondary complications related to debility.     Discussed with rehab team.  ASSESSMENT/PLAN  72yFemale with functional deficits after CVA RHP with slurred speech, chronic low back pain, neuropathy, left foot drop.   Pain - Tylenol  DVT PPX - SCDs  Rehab -    Recommend ACUTE inpatient rehabilitation for the functional deficits consisting of 3 hours of therapy/day & 24 hour RN/daily PMR physician for comorbid medical management. Patient will be able to tolerate 3 hours a day.   The patient will need 2 negative covid tests 24 hours a part after day 7 of isolation for Covid.  Will continue to follow. Functional progress will determine ongoing rehab dispo recommendations, which may change.    Continue bedside therapy as well as OOB throughout the day with mobilization by staff to maintain cardiopulmonary function and prevention of secondary complications related to debility.   Speech evaluation

## 2022-12-20 NOTE — PROGRESS NOTE ADULT - SUBJECTIVE AND OBJECTIVE BOX
Interval History:  12/20/22: No new complaints. States she is feeling better.    MEDICATIONS  (STANDING):  aspirin enteric coated 81 milliGRAM(s) Oral daily  atorvastatin 80 milliGRAM(s) Oral at bedtime  cholecalciferol 1000 Unit(s) Oral daily  dexAMETHasone  Injectable 6 milliGRAM(s) IV Push daily  DULoxetine 60 milliGRAM(s) Oral daily  enoxaparin Injectable 40 milliGRAM(s) SubCutaneous every 24 hours  gabapentin 300 milliGRAM(s) Oral at bedtime  guaiFENesin  milliGRAM(s) Oral every 12 hours  primidone 50 milliGRAM(s) Oral four times a day  remdesivir  IVPB   IV Intermittent   remdesivir  IVPB 100 milliGRAM(s) IV Intermittent every 24 hours  rOPINIRole 0.25 milliGRAM(s) Oral daily  tiotropium 2.5 MICROgram(s) Inhaler 2 Puff(s) Inhalation daily    MEDICATIONS  (PRN):  albuterol    90 MICROgram(s) HFA Inhaler 2 Puff(s) Inhalation every 6 hours PRN Shortness of Breath and/or Wheezing  traMADol 50 milliGRAM(s) Oral every 6 hours PRN Moderate Pain (4 - 6)  zolpidem 5 milliGRAM(s) Oral at bedtime PRN Insomnia  zolpidem 5 milliGRAM(s) Oral at bedtime PRN Insomnia      Allergies    No Known Allergies    Intolerances        PHYSICAL EXAM:  Vital Signs Last 24 Hrs  T(F): 97.5 (12-20-22 @ 09:12)  HR: 77 (12-20-22 @ 09:12)  BP: 112/60 (12-20-22 @ 09:12)  RR: 18 (12-20-22 @ 09:12)    GENERAL: NAD, well-groomed  HEAD:  Atraumatic, Normocephalic  Neuro:  HF: A x O x 3. Appropriately interactive, normal affect. Speech fluent, No Aphasia or paraphasic errors. Naming /repetition intact   CN: ADAN, EOMI, VFF, facial sensation normal, +flattened left NL fold, tongue midline, Palate moves equally, SCM equal bilaterally  Motor: 4/5 right hand , no pronator drift, RLE 4/5, LUE 5/5, LLE 5/5 proximally, 2/5 left foot dorsiflexion (chronic)  Sens: Intact to light touch   Reflexes: 2+ in LUE/LLE (except for 0 ankle jerk), relatively brisk in RUE/RLE, downgoing toe on left, mute toe on right  Coord:  No dysmetria        LABS:                        13.1   6.50  )-----------( 200      ( 20 Dec 2022 07:48 )             39.0     12-20    138  |  106  |  22  ----------------------------<  94  4.2   |  25  |  0.61    Ca    8.2<L>      20 Dec 2022 07:48    TPro  6.2  /  Alb  3.0<L>  /  TBili  0.3  /  DBili  0.1  /  AST  39<H>  /  ALT  27  /  AlkPhos  58  12-20          RADIOLOGY & ADDITIONAL STUDIES:  CT head 12/17/22:  No acute intracranial hemorrhage, vasogenic edema, extra-axial collection   or hydrocephalus. Tiny chronic lacunar infarcts head of the right caudate   nucleus and right corona radiata.

## 2022-12-20 NOTE — DISCHARGE NOTE NURSING/CASE MANAGEMENT/SOCIAL WORK - NSDCPEFALRISK_GEN_ALL_CORE
For information on Fall & Injury Prevention, visit: [email or web address] OR  [email or web address] OR  https://www.cdc.gov/steadi/patient.html

## 2022-12-20 NOTE — CONSULT NOTE ADULT - SUBJECTIVE AND OBJECTIVE BOX
72yF was admitted on 12-17    Patient is a 72y old  Female who presents with a chief complaint of shortness of breath (19 Dec 2022 16:41)    HPI:  71 y/o F with PMHx of Emphysema, Breast Ca s/p mastectomy, Melanoma, Ovarian tumor (benign, per pt) Hx of herniated disc, chronic back pain, neuropathy with chronic left foot drop, Essential Tremors presents to  c/o URI like symptoms. Reports of cough, congestion, genearlized malaise. States symptoms started on 12/14. Patient also reports of generalized weakness, reports of perioral tingling. Reports of "curling" of the fingers in the right hand. Patient reports Hx of multiple herniated discs in the "L region" and had MRI done last year at [facility]. Patient denies  headache, dizziness, change in vision, blurry vision, anosmia, ageusia, chest pain, palpitations, shortness of breath, abdominal pain, nausea, vomiting, diarrhea, constipation, change in bowel movement, weight loss, dysuria, urinary frequency, urgency, hematuria, urinary/bowel incontinence or saddle anesthesia.   In ED, T: 98.2, HR: 77, BP: 105/60, RR: 18, SpO2 90% on RA. Pt received IV fluids and dose of duoneb. Reports feeling better after the treatment. LP was performed as there was concern for ascending weakness/paralysis     Neurology Evaluation  73 yo woman with acute COVID infection, reporting symptoms of a right hemiparesis since Friday, and exam suggestive of an acute ischemic stroke (apparent crossed findings of left facial weakness and right hemiparesis which might suggest brainstem infarct).    Initially there was concern for GBS. LP was performed, CSF was unremarkable.    PMH  1) Emphysema, not on home oxygen   2) Breast CA   3) Melanoma   4) Herniated disc, chronic back pain with sciatica   5) Neuropathy with chronic left foot   6) Benign ovarian tumor   7) Essential Tremor     SHx  1) Mastectomy   2) Stapedectomy    FHx  1) Father: none   2) Mother: Breast CA     (18 Dec 2022 03:48)      Imaging performed:  Head CT: 12.17.2022 No acute intracranial hemorrhage, vasogenic edema, extra-axial collection or hydrocephalus. Tiny chronic lacunar infarcts head of the right caudate nucleus and right corona radiata.      REVIEW OF SYSTEMS  Constitutional - No fever, No weight loss, No fatigue  HEENT - No eye pain, No visual disturbances, No difficulty hearing, No tinnitus, No vertigo, No neck pain  Respiratory - No cough, No wheezing, No shortness of breath  Cardiovascular - No chest pain, No palpitations  Gastrointestinal - No abdominal pain, No nausea, No vomiting, No diarrhea, No constipation  Genitourinary - No dysuria, No frequency, No hematuria, No incontinence  Neurological - No headaches, No memory loss, No loss of strength, No numbness, No tremors  Skin - No itching, No rashes, No lesions   Endocrine - No temperature intolerance  Musculoskeletal - No joint pain, No joint swelling, No muscle pain  Psychiatric - No depression, No anxiety    VITALS  T(C): 36.7 (12-19-22 @ 21:54), Max: 37.1 (12-19-22 @ 19:19)  HR: 81 (12-19-22 @ 21:54) (70 - 81)  BP: 102/60 (12-19-22 @ 21:54) (102/60 - 120/69)  RR: 18 (12-19-22 @ 21:54) (18 - 18)  SpO2: 95% (12-19-22 @ 21:54) (94% - 95%)  Wt(kg): --    PAST MEDICAL & SURGICAL HISTORY  Emphysema/COPD        SOCIAL HISTORY - as per documentation/history  Smoking - None  EtOH - None  Drugs - None    FUNCTIONAL HISTORY  Lives   Independent    CURRENT FUNCTIONAL STATUS      FAMILY HISTORY       RECENT LABS - Reviewed  CBC Full  -  ( 20 Dec 2022 07:48 )  WBC Count : 6.50 K/uL  RBC Count : 4.33 M/uL  Hemoglobin : 13.1 g/dL  Hematocrit : 39.0 %  Platelet Count - Automated : 200 K/uL  Mean Cell Volume : 90.1 fl  Mean Cell Hemoglobin : 30.3 pg  Mean Cell Hemoglobin Concentration : 33.6 gm/dL  Auto Neutrophil # : x  Auto Lymphocyte # : x  Auto Monocyte # : x  Auto Eosinophil # : x  Auto Basophil # : x  Auto Neutrophil % : x  Auto Lymphocyte % : x  Auto Monocyte % : x  Auto Eosinophil % : x  Auto Basophil % : x    12-19    138  |  106  |  16  ----------------------------<  95  3.8   |  21<L>  |  0.55    Ca    8.2<L>      19 Dec 2022 07:23    TPro  6.4  /  Alb  3.1<L>  /  TBili  0.4  /  DBili  <0.1  /  AST  40<H>  /  ALT  27  /  AlkPhos  61  12-19        ALLERGIES  No Known Allergies      MEDICATIONS   albuterol    90 MICROgram(s) HFA Inhaler 2 Puff(s) Inhalation every 6 hours PRN  aspirin enteric coated 81 milliGRAM(s) Oral daily  atorvastatin 80 milliGRAM(s) Oral at bedtime  cholecalciferol 1000 Unit(s) Oral daily  dexAMETHasone  Injectable 6 milliGRAM(s) IV Push daily  DULoxetine 60 milliGRAM(s) Oral daily  enoxaparin Injectable 40 milliGRAM(s) SubCutaneous every 24 hours  gabapentin 300 milliGRAM(s) Oral at bedtime  guaiFENesin  milliGRAM(s) Oral every 12 hours  primidone 50 milliGRAM(s) Oral four times a day  remdesivir  IVPB   IV Intermittent   remdesivir  IVPB 100 milliGRAM(s) IV Intermittent every 24 hours  rOPINIRole 0.25 milliGRAM(s) Oral daily  tiotropium 2.5 MICROgram(s) Inhaler 2 Puff(s) Inhalation daily  traMADol 50 milliGRAM(s) Oral every 6 hours PRN  zolpidem 5 milliGRAM(s) Oral at bedtime PRN  zolpidem 5 milliGRAM(s) Oral at bedtime PRN      ----------------------------------------------------------------------------------------  PHYSICAL EXAM  Constitutional - NAD, Comfortable  HEENT - NCAT, EOMI  Neck - Supple, No limited ROM  Chest - Breathing comfortably, No wheezing  Cardiovascular - S1S2   Abdomen - Soft   Extremities - No C/C/E, No calf tenderness   Neurologic Exam -                    Cognitive - AAO to self, place, date, year, situation     Communication - Fluent, No dysarthria     Cranial Nerves - CN 2-12 intact     Motor - No focal deficits                    LEFT    UE - ShAB 5/5, EF 5/5, EE 5/5, WE 5/5,  5/5                    RIGHT UE - ShAB 5/5, EF 5/5, EE 5/5, WE 5/5,  5/5                    LEFT    LE - HF 5/5, KE 5/5, DF 5/5, PF 5/5                    RIGHT LE - HF 5/5, KE 5/5, DF 5/5, PF 5/5        Sensory - Intact to LT     Reflexes - DTR Intact, No primitive reflexive     Coordination - FTN intact     OculoVestibular - No saccades, No nystagmus, VOR         Balance - WNL Static  Psychiatric - Mood stable, Affect WNL  ----------------------------------------------------------------------------------------   72yF was admitted on 12-17    Patient is a 72y old  Female who presents with a chief complaint of shortness of breath (19 Dec 2022 16:41)    HPI:  71 y/o F with PMHx of Emphysema, Breast Ca s/p mastectomy, Melanoma, Ovarian tumor (benign, per pt) Hx of herniated disc, chronic back pain, neuropathy with chronic left foot drop, Essential Tremors presents to  c/o URI like symptoms. Reports of cough, congestion, genearlized malaise. States symptoms started on 12/14. Patient also reports of generalized weakness, reports of perioral tingling. Reports of "curling" of the fingers in the right hand. Patient reports Hx of multiple herniated discs in the "L region" and had MRI done last year at [facility]. Patient denies  headache, dizziness, change in vision, blurry vision, anosmia, ageusia, chest pain, palpitations, shortness of breath, abdominal pain, nausea, vomiting, diarrhea, constipation, change in bowel movement, weight loss, dysuria, urinary frequency, urgency, hematuria, urinary/bowel incontinence or saddle anesthesia.   In ED, T: 98.2, HR: 77, BP: 105/60, RR: 18, SpO2 90% on RA. Pt received IV fluids and dose of duoneb. Reports feeling better after the treatment. LP was performed as there was concern for ascending weakness/paralysis     Neurology Evaluation  71 yo woman with acute COVID infection, reporting symptoms of a right hemiparesis since Friday, and exam suggestive of an acute ischemic stroke (apparent crossed findings of left facial weakness and right hemiparesis which might suggest brainstem infarct).    Initially there was concern for GBS. LP was performed, CSF was unremarkable.    The patient has difficulty with mobility due to  CVA RHP with slurred speech, chronic low back pain, neuropathy, left foot drop.   On Isolation for Covid-19 since 12/17/2022    PMH  1) Emphysema, not on home oxygen   2) Breast CA   3) Melanoma   4) Herniated disc, chronic back pain with sciatica   5) Neuropathy with chronic left foot   6) Benign ovarian tumor   7) Essential Tremor     SHx  1) Mastectomy   2) Stapedectomy    FHx  1) Father: none   2) Mother: Breast CA     (18 Dec 2022 03:48)      Imaging performed:  Head CT: 12.17.2022 No acute intracranial hemorrhage, vasogenic edema, extra-axial collection or hydrocephalus. Tiny chronic lacunar infarcts head of the right caudate nucleus and right corona radiata.      REVIEW OF SYSTEMS  Constitutional - No fever, No weight loss, No fatigue  HEENT - No eye pain, No visual disturbances, No difficulty hearing, No tinnitus, No vertigo, No neck pain  Respiratory - No cough, No wheezing, No shortness of breath  Cardiovascular - No chest pain, No palpitations  Gastrointestinal - No abdominal pain, No nausea, No vomiting, No diarrhea, No constipation  Genitourinary - No dysuria, No frequency, No hematuria, No incontinence  Neurological - No headaches, No memory loss, No loss of strength, No numbness, No tremors  Skin - No itching, No rashes, No lesions   Endocrine - No temperature intolerance  Musculoskeletal - No joint pain, No joint swelling, No muscle pain  Psychiatric - No depression, No anxiety    VITALS  T(C): 36.7 (12-19-22 @ 21:54), Max: 37.1 (12-19-22 @ 19:19)  HR: 81 (12-19-22 @ 21:54) (70 - 81)  BP: 102/60 (12-19-22 @ 21:54) (102/60 - 120/69)  RR: 18 (12-19-22 @ 21:54) (18 - 18)  SpO2: 95% (12-19-22 @ 21:54) (94% - 95%)  Wt(kg): --    PAST MEDICAL & SURGICAL HISTORY  Emphysema/COPD        SOCIAL HISTORY - as per documentation/history  Smoking - None  EtOH - None  Drugs - None    FUNCTIONAL HISTORY  Lives with  in a condo. 8+8 LOBO B HR PTA I ADLS    CURRENT FUNCTIONAL STATUS  supine to sit with CG She has difficulty moving RLE  Sit to stand moderate to maximum assist due to pushing right    RECENT LABS - Reviewed  CBC Full  -  ( 20 Dec 2022 07:48 )  WBC Count : 6.50 K/uL  RBC Count : 4.33 M/uL  Hemoglobin : 13.1 g/dL  Hematocrit : 39.0 %  Platelet Count - Automated : 200 K/uL  Mean Cell Volume : 90.1 fl  Mean Cell Hemoglobin : 30.3 pg  Mean Cell Hemoglobin Concentration : 33.6 gm/dL  Auto Neutrophil # : x  Auto Lymphocyte # : x  Auto Monocyte # : x  Auto Eosinophil # : x  Auto Basophil # : x  Auto Neutrophil % : x  Auto Lymphocyte % : x  Auto Monocyte % : x  Auto Eosinophil % : x  Auto Basophil % : x    12-19    138  |  106  |  16  ----------------------------<  95  3.8   |  21<L>  |  0.55    Ca    8.2<L>      19 Dec 2022 07:23    TPro  6.4  /  Alb  3.1<L>  /  TBili  0.4  /  DBili  <0.1  /  AST  40<H>  /  ALT  27  /  AlkPhos  61  12-19    ALLERGIES  No Known Allergies    MEDICATIONS   albuterol    90 MICROgram(s) HFA Inhaler 2 Puff(s) Inhalation every 6 hours PRN  aspirin enteric coated 81 milliGRAM(s) Oral daily  atorvastatin 80 milliGRAM(s) Oral at bedtime  cholecalciferol 1000 Unit(s) Oral daily  dexAMETHasone  Injectable 6 milliGRAM(s) IV Push daily  DULoxetine 60 milliGRAM(s) Oral daily  enoxaparin Injectable 40 milliGRAM(s) SubCutaneous every 24 hours  gabapentin 300 milliGRAM(s) Oral at bedtime  guaiFENesin  milliGRAM(s) Oral every 12 hours  primidone 50 milliGRAM(s) Oral four times a day  remdesivir  IVPB   IV Intermittent   remdesivir  IVPB 100 milliGRAM(s) IV Intermittent every 24 hours  rOPINIRole 0.25 milliGRAM(s) Oral daily  tiotropium 2.5 MICROgram(s) Inhaler 2 Puff(s) Inhalation daily  traMADol 50 milliGRAM(s) Oral every 6 hours PRN  zolpidem 5 milliGRAM(s) Oral at bedtime PRN  zolpidem 5 milliGRAM(s) Oral at bedtime PRN      ----------------------------------------------------------------------------------------  PHYSICAL EXAM  Constitutional - NAD, Comfortable  HEENT - NCAT, EOMI  Neck - Supple, No limited ROM  Chest - Breathing comfortably, No wheezing  Cardiovascular - S1S2   Abdomen - Soft   Extremities - No C/C/E, No calf tenderness   Neurologic Exam -                    Cognitive - AAO to self, place, date, year, situation     Communication - slurred     Cranial Nerves - CN 2-12 intact     Motor - No focal deficits                    LEFT    UE - ShAB 4/5, EF 4/5, EE 4/5, WE 4/5,  4/5                    RIGHT UE - ShAB 3/5, EF 3/5, EE 3/5, WE 3/5,  3/5                    LEFT    LE - HF 4-/5, KE 4-/5, DF 1/5, PF 1/5                    RIGHT LE - HF 3/5, KE 3/5, DF 3/5, PF 3/5        Sensory - decreased to LT in the legs     Reflexes - DTR Intact, No primitive reflexive     Coordination - FTN right fair     OculoVestibular - No saccades, No nystagmus, VOR         Balance -poor  Static  Psychiatric - Mood stable, Affect WNL  ----------------------------------------------------------------------------------------

## 2022-12-20 NOTE — DISCHARGE NOTE NURSING/CASE MANAGEMENT/SOCIAL WORK - PATIENT PORTAL LINK FT
You can access the FollowMyHealth Patient Portal offered by [facility] by registering at the following website: [email or web address]. By joining [email or web address] FollowMyHealth portal, you will also be able to view your health information using other applications (apps) compatible with our system.

## 2022-12-20 NOTE — PROGRESS NOTE ADULT - SUBJECTIVE AND OBJECTIVE BOX
CC: shortness of breath (18 Dec 2022 10:57)    Patient is a 73 y/o F with PMHx of Emphysema, Breast Ca s/p mastectomy, Melanoma, Ovarian tumor (benign, per pt) Hx of herniated disc, chronic back pain, neuropathy with chronic left foot drop, Essential Tremors presents to  c/o URI like symptoms. Reports of cough, congestion, genearlized malaise. States symptoms started on 12/14. Patient also reports of generalized weakness, reports of perioral tingling. Reports of "curling" of the fingers in the right hand. Patient reports Hx of multiple herniated discs in the "L region" and had MRI done last year at [facility]. Patient denies  headache, dizziness, change in vision, blurry vision, anosmia, ageusia, chest pain, palpitations, shortness of breath, abdominal pain, nausea, vomiting, diarrhea, constipation, change in bowel movement, weight loss, dysuria, urinary frequency, urgency, hematuria, urinary/bowel incontinence or saddle anesthesia.     (R) sided weakness improving. Denies any HA, CP, SOB. Pending MRI of the brain    REVIEW OF SYSTEMS:  All other review of systems is negative unless indicated above.    PHYSICAL EXAM:  ICU Vital Signs Last 24 Hrs  T(C): 36.4 (20 Dec 2022 09:12), Max: 37.1 (19 Dec 2022 19:19)  T(F): 97.5 (20 Dec 2022 09:12), Max: 98.7 (19 Dec 2022 19:19)  HR: 77 (20 Dec 2022 09:12) (70 - 86)  BP: 112/60 (20 Dec 2022 09:12) (102/60 - 120/69)  BP(mean): --  ABP: --  ABP(mean): --  RR: 18 (20 Dec 2022 09:12) (18 - 18)  SpO2: 92% (20 Dec 2022 09:12) (92% - 95%)    O2 Parameters below as of 20 Dec 2022 09:12  Patient On (Oxygen Delivery Method): room air          General: Deconditioned, frail.   Eyes: PERRLA, EOMI; conjunctiva and sclera clear  Head: Normocephalic; atraumatic  ENMT: No nasal discharge; airway clear  Neck: Supple; non tender; no masses  Respiratory: No wheezes, rales or rhonchi  Cardiovascular: Regular rate and rhythm. S1 and S2 Normal; No murmurs, gallops or rubs  Gastrointestinal: Soft non-tender non-distended; Normal bowel sounds  Genitourinary: No  suprapubic  tenderness  Extremities: Normal range of motion, No clubbing, cyanosis or edema  Vascular: Peripheral pulses palpable 2+ bilaterally  Neurological: (R) motor deficits  Skin: Warm and dry. No acute rash  Lymph Nodes: No acute cervical adenopathy  Musculoskeletal: Normal muscle tone, without deformities  Psychiatric: Cooperative and appropriate      CBC Full  -  ( 20 Dec 2022 07:48 )  WBC Count : 6.50 K/uL  RBC Count : 4.33 M/uL  Hemoglobin : 13.1 g/dL  Hematocrit : 39.0 %  Platelet Count - Automated : 200 K/uL  Mean Cell Volume : 90.1 fl  Mean Cell Hemoglobin : 30.3 pg  Mean Cell Hemoglobin Concentration : 33.6 gm/dL  Auto Neutrophil # : x  Auto Lymphocyte # : x  Auto Monocyte # : x  Auto Eosinophil # : x  Auto Basophil # : x  Auto Neutrophil % : x  Auto Lymphocyte % : x  Auto Monocyte % : x  Auto Eosinophil % : x  Auto Basophil % : x        12-20    138  |  106  |  22  ----------------------------<  94  4.2   |  25  |  0.61    Ca    8.2<L>      20 Dec 2022 07:48    TPro  6.2  /  Alb  3.0<L>  /  TBili  0.3  /  DBili  0.1  /  AST  39<H>  /  ALT  27  /  AlkPhos  58  12-20      73 y/o F with PMHx of Emphysema, Breast Ca s/p mastectomy, Melanoma, Ovarian tumor (benign, per pt) Hx of herniated disc, chronic back pain, neuropathy with chronic left foot drop, Essential Tremors admitted for:     # COVID-19  URI   - No respiratory failure, Pts pulse ox above 90%, tolerates RA  - Monitor pulse ox, supplemental oxygen PRN with target SpO2>92  - Started on Remdesvir + Decadron, will consider 3  days course unless will become hypoxic   - C/w albuterol  PRN   - antipyretics and antiemetics PRN   - check proinflammatory markers   - D/w Dr Mehta    # Hyponatremia   - Na -  138    # Worsening LLE weakness,  new RLE/ R hand weakness with L facial droop - rule out CVA  - s/p LP in ED   - CSF studies d/w neuro,  not suggestive of infection or GBS, will need to r/o stroke   - NIHSS 4   - neurochecks q4   - C/w ASA  - C/w Lipitor   - pending MRI/MRA   - Speech and swallow eval     # Hypokalemia  - replace PO    # Depression. Neuropathy,  foot drop   -Duloxetine   -Gabapentin   -Ropinirole    #Emphysema, stable   -on Anoro Ellipta     #Essential Tremor   -Primidone     #Insomnia   -Zolpidem     #DVT Prophylaxis   -Lovenox     #DIET   -DASH/TLC

## 2022-12-21 LAB
ALBUMIN SERPL ELPH-MCNC: 2.9 G/DL — LOW (ref 3.3–5)
ALP SERPL-CCNC: 58 U/L — SIGNIFICANT CHANGE UP (ref 40–120)
ALT FLD-CCNC: 35 U/L — SIGNIFICANT CHANGE UP (ref 12–78)
AST SERPL-CCNC: 49 U/L — HIGH (ref 15–37)
BILIRUB DIRECT SERPL-MCNC: 0.1 MG/DL — SIGNIFICANT CHANGE UP (ref 0–0.3)
BILIRUB INDIRECT FLD-MCNC: 0.2 MG/DL — SIGNIFICANT CHANGE UP (ref 0.2–1)
BILIRUB SERPL-MCNC: 0.3 MG/DL — SIGNIFICANT CHANGE UP (ref 0.2–1.2)
PROT SERPL-MCNC: 6 GM/DL — SIGNIFICANT CHANGE UP (ref 6–8.3)

## 2022-12-21 PROCEDURE — 99233 SBSQ HOSP IP/OBS HIGH 50: CPT

## 2022-12-21 PROCEDURE — 99232 SBSQ HOSP IP/OBS MODERATE 35: CPT

## 2022-12-21 PROCEDURE — 93306 TTE W/DOPPLER COMPLETE: CPT | Mod: 26

## 2022-12-21 RX ADMIN — REMDESIVIR 200 MILLIGRAM(S): 5 INJECTION INTRAVENOUS at 06:08

## 2022-12-21 RX ADMIN — DULOXETINE HYDROCHLORIDE 60 MILLIGRAM(S): 30 CAPSULE, DELAYED RELEASE ORAL at 09:56

## 2022-12-21 RX ADMIN — Medication 600 MILLIGRAM(S): at 09:56

## 2022-12-21 RX ADMIN — GABAPENTIN 300 MILLIGRAM(S): 400 CAPSULE ORAL at 22:08

## 2022-12-21 RX ADMIN — PRIMIDONE 50 MILLIGRAM(S): 250 TABLET ORAL at 17:07

## 2022-12-21 RX ADMIN — Medication 6 MILLIGRAM(S): at 10:04

## 2022-12-21 RX ADMIN — TIOTROPIUM BROMIDE 2 PUFF(S): 18 CAPSULE ORAL; RESPIRATORY (INHALATION) at 09:30

## 2022-12-21 RX ADMIN — PRIMIDONE 50 MILLIGRAM(S): 250 TABLET ORAL at 00:17

## 2022-12-21 RX ADMIN — PRIMIDONE 50 MILLIGRAM(S): 250 TABLET ORAL at 06:08

## 2022-12-21 RX ADMIN — Medication 1000 UNIT(S): at 09:56

## 2022-12-21 RX ADMIN — Medication 600 MILLIGRAM(S): at 22:07

## 2022-12-21 RX ADMIN — ROPINIROLE 0.25 MILLIGRAM(S): 8 TABLET, FILM COATED, EXTENDED RELEASE ORAL at 09:56

## 2022-12-21 RX ADMIN — PRIMIDONE 50 MILLIGRAM(S): 250 TABLET ORAL at 12:28

## 2022-12-21 RX ADMIN — ENOXAPARIN SODIUM 40 MILLIGRAM(S): 100 INJECTION SUBCUTANEOUS at 06:07

## 2022-12-21 RX ADMIN — Medication 81 MILLIGRAM(S): at 09:56

## 2022-12-21 RX ADMIN — ATORVASTATIN CALCIUM 80 MILLIGRAM(S): 80 TABLET, FILM COATED ORAL at 22:07

## 2022-12-21 RX ADMIN — ZOLPIDEM TARTRATE 5 MILLIGRAM(S): 10 TABLET ORAL at 22:14

## 2022-12-21 NOTE — PROGRESS NOTE ADULT - SUBJECTIVE AND OBJECTIVE BOX
Date of service: 12-21-22 @ 11:50    Has dry cough  No SOB at rest  No fever    ROS: no fever or chills; denies dizziness, no HA, no abdominal pain, no diarrhea or constipation; no dysuria, no legs pain, no rashes    MEDICATIONS  (STANDING):  aspirin enteric coated 81 milliGRAM(s) Oral daily  atorvastatin 80 milliGRAM(s) Oral at bedtime  cholecalciferol 1000 Unit(s) Oral daily  dexAMETHasone  Injectable 6 milliGRAM(s) IV Push daily  DULoxetine 60 milliGRAM(s) Oral daily  enoxaparin Injectable 40 milliGRAM(s) SubCutaneous every 24 hours  gabapentin 300 milliGRAM(s) Oral at bedtime  guaiFENesin  milliGRAM(s) Oral every 12 hours  primidone 50 milliGRAM(s) Oral four times a day  remdesivir  IVPB   IV Intermittent   remdesivir  IVPB 100 milliGRAM(s) IV Intermittent every 24 hours  rOPINIRole 0.25 milliGRAM(s) Oral daily  tiotropium 2.5 MICROgram(s) Inhaler 2 Puff(s) Inhalation daily    Vital Signs Last 24 Hrs  T(C): 36.6 (21 Dec 2022 08:33), Max: 36.8 (20 Dec 2022 21:10)  T(F): 97.9 (21 Dec 2022 08:33), Max: 98.2 (20 Dec 2022 21:10)  HR: 89 (21 Dec 2022 08:33) (76 - 89)  BP: 102/59 (21 Dec 2022 08:33) (101/69 - 104/57)  BP(mean): --  RR: 18 (21 Dec 2022 08:33) (17 - 18)  SpO2: 93% (21 Dec 2022 08:33) (93% - 95%)    Parameters below as of 21 Dec 2022 08:33  Patient On (Oxygen Delivery Method): room air  O2 Flow (L/min): 14     Physical exam:    Constitutional:  No acute distress  HEENT: NC/AT, EOMI, PERRLA, conjunctivae clear; ears and nose atraumatic  Neck: supple; thyroid not palpable  Back: no tenderness  Respiratory: respiratory effort normal; crackles at bases  Cardiovascular: S1S2 regular, no murmurs  Abdomen: soft, not tender, not distended, positive BS  Genitourinary: no suprapubic tenderness  Lymphatic: no LN palpable  Musculoskeletal: no muscle tenderness, no joint swelling or tenderness  Extremities: no pedal edema  Neurological/ Psychiatric: AxOx3, judgement and insight normal; moving all extremities; gait weakness  Skin: no rashes; no palpable lesions    Labs: reviewed                        13.1   6.50  )-----------( 200      ( 20 Dec 2022 07:48 )             39.0     12-20    138  |  106  |  22  ----------------------------<  94  4.2   |  25  |  0.61    Ca    8.2<L>      20 Dec 2022 07:48    TPro  6.0  /  Alb  2.9<L>  /  TBili  0.3  /  DBili  0.1  /  AST  49<H>  /  ALT  35  /  AlkPhos  58  12-21    D-Dimer Assay, Quantitative: <150 ng/mL DDU (12-19-22 @ 07:23)  C-Reactive Protein, Serum: 9 mg/L (12-19-22 @ 07:23)  Ferritin, Serum: 393 ng/mL (12-19-22 @ 07:23)                        13.6   4.74  )-----------( 197      ( 19 Dec 2022 07:23 )             39.3     12-19    138  |  106  |  16  ----------------------------<  95  3.8   |  21<L>  |  0.55    Ca    8.2<L>      19 Dec 2022 07:23    TPro  6.4  /  Alb  3.1<L>  /  TBili  0.4  /  DBili  <0.1  /  AST  40<H>  /  ALT  27  /  AlkPhos  61  12-19    D-Dimer Assay, Quantitative: <150 ng/mL DDU (12-19-22 @ 07:23)  C-Reactive Protein, Serum: 9 mg/L (12-19-22 @ 07:23)  Ferritin, Serum: 393 ng/mL (12-19-22 @ 07:23)                        13.9   3.54  )-----------( 173      ( 17 Dec 2022 20:01 )             40.2     12-18    x   |  x   |  x   ----------------------------<  x   x    |  x   |  0.59    Ca    8.3<L>      17 Dec 2022 20:01    TPro  6.5  /  Alb  3.2<L>  /  TBili  0.4  /  DBili  <0.1  /  AST  33  /  ALT  25  /  AlkPhos  62  12-18     LIVER FUNCTIONS - ( 18 Dec 2022 08:14 )  Alb: 3.2 g/dL / Pro: 6.5 gm/dL / ALK PHOS: 62 U/L / ALT: 25 U/L / AST: 33 U/L / GGT: x             Culture - CSF with Gram Stain (collected 17 Dec 2022 22:24)  Source: .CSF  Gram Stain (18 Dec 2022 06:18):    No polymorphonuclear leukocytes seen    No organisms seen    by cytocentrifuge    Radiology: all available radiological tests reviewed    < from: CT Head No Cont (12.17.22 @ 23:20) >  No acute intracranial hemorrhage, vasogenic edema, extra-axial collection   or hydrocephalus. Tiny chronic lacunar infarcts head of the right caudate   nucleus and right corona radiata.  < end of copied text >    Advanced directives addressed: full resuscitation

## 2022-12-21 NOTE — PROGRESS NOTE ADULT - SUBJECTIVE AND OBJECTIVE BOX
CC: shortness of breath (18 Dec 2022 10:57)    Patient is a 73 y/o F with PMHx of Emphysema, Breast Ca s/p mastectomy, Melanoma, Ovarian tumor (benign, per pt) Hx of herniated disc, chronic back pain, neuropathy with chronic left foot drop, Essential Tremors presents to  c/o URI like symptoms. Reports of cough, congestion, genearlized malaise. States symptoms started on 12/14. Patient also reports of generalized weakness, reports of perioral tingling. Reports of "curling" of the fingers in the right hand. Patient reports Hx of multiple herniated discs in the "L region" and had MRI done last year at [facility]. Patient denies  headache, dizziness, change in vision, blurry vision, anosmia, ageusia, chest pain, palpitations, shortness of breath, abdominal pain, nausea, vomiting, diarrhea, constipation, change in bowel movement, weight loss, dysuria, urinary frequency, urgency, hematuria, urinary/bowel incontinence or saddle anesthesia.     Medical progress: + (R) sided weakness. pending MRI of the brain for suspected stroke  Complaints: wekaness  State of mind: normal    REVIEW OF SYSTEMS:  All other review of systems is negative unless indicated above.    PHYSICAL EXAM:  ICU Vital Signs Last 24 Hrs  T(C): 36.6 (21 Dec 2022 08:33), Max: 36.8 (20 Dec 2022 21:10)  T(F): 97.9 (21 Dec 2022 08:33), Max: 98.2 (20 Dec 2022 21:10)  HR: 89 (21 Dec 2022 08:33) (76 - 89)  BP: 102/59 (21 Dec 2022 08:33) (101/69 - 104/57)  BP(mean): --  ABP: --  ABP(mean): --  RR: 18 (21 Dec 2022 08:33) (17 - 18)  SpO2: 93% (21 Dec 2022 08:33) (93% - 95%)    O2 Parameters below as of 21 Dec 2022 08:33  Patient On (Oxygen Delivery Method): room air  O2 Flow (L/min): 14        General: Deconditioned, frail.   Eyes: PERRLA, EOMI; conjunctiva and sclera clear  Head: Normocephalic; atraumatic  ENMT: No nasal discharge; airway clear  Neck: Supple; non tender; no masses  Respiratory: No wheezes, rales or rhonchi  Cardiovascular: Regular rate and rhythm. S1 and S2 Normal; No murmurs, gallops or rubs  Gastrointestinal: Soft non-tender non-distended; Normal bowel sounds  Genitourinary: No  suprapubic  tenderness  Extremities: Normal range of motion, No clubbing, cyanosis or edema  Vascular: Peripheral pulses palpable 2+ bilaterally  Neurological: (R) motor deficits  Skin: Warm and dry. No acute rash  Lymph Nodes: No acute cervical adenopathy  Musculoskeletal: Normal muscle tone, without deformities  Psychiatric: Cooperative and appropriate      CBC Full  -  ( 20 Dec 2022 07:48 )  WBC Count : 6.50 K/uL  RBC Count : 4.33 M/uL  Hemoglobin : 13.1 g/dL  Hematocrit : 39.0 %  Platelet Count - Automated : 200 K/uL  Mean Cell Volume : 90.1 fl  Mean Cell Hemoglobin : 30.3 pg  Mean Cell Hemoglobin Concentration : 33.6 gm/dL  Auto Neutrophil # : x  Auto Lymphocyte # : x  Auto Monocyte # : x  Auto Eosinophil # : x  Auto Basophil # : x  Auto Neutrophil % : x  Auto Lymphocyte % : x  Auto Monocyte % : x  Auto Eosinophil % : x  Auto Basophil % : x        12-20    138  |  106  |  22  ----------------------------<  94  4.2   |  25  |  0.61    Ca    8.2<L>      20 Dec 2022 07:48    TPro  6.2  /  Alb  3.0<L>  /  TBili  0.3  /  DBili  0.1  /  AST  39<H>  /  ALT  27  /  AlkPhos  58  12-20      73 y/o F with PMHx of Emphysema, Breast Ca s/p mastectomy, Melanoma, Ovarian tumor (benign, per pt) Hx of herniated disc, chronic back pain, neuropathy with chronic left foot drop, Essential Tremors admitted for:     # COVID-19  URI   - No respiratory failure, Pts pulse ox above 90%, tolerates RA  - Monitor pulse ox, supplemental oxygen PRN with target SpO2>92  - Started on Remdesvir + Decadron, will consider 3  days course unless will become hypoxic   - C/w albuterol  PRN   - antipyretics and antiemetics PRN   - check proinflammatory markers     # Hyponatremia   - Na -  138    # Worsening LLE weakness,  new RLE/ R hand weakness with L facial droop - rule out CVA  - s/p LP in ED   - CSF studies d/w neuro,  not suggestive of infection or GBS, will need to r/o stroke   - NIHSS 4   - neurochecks q4   - C/w ASA  - C/w Lipitor   - pending MRI/MRA   - Speech and swallow eval   - care discussed with Dr. Duran    # Hypokalemia  - replace PO    # Depression. Neuropathy,  foot drop   -Duloxetine   -Gabapentin   -Ropinirole    #Emphysema, stable   -on Anoro Ellipta     #Essential Tremor   -Primidone     #Insomnia   -Zolpidem     #DVT Prophylaxis   -Lovenox     #DIET   -DASH/TLC

## 2022-12-22 LAB
ALBUMIN SERPL ELPH-MCNC: 3 G/DL — LOW (ref 3.3–5)
ALP SERPL-CCNC: 63 U/L — SIGNIFICANT CHANGE UP (ref 40–120)
ALT FLD-CCNC: 40 U/L — SIGNIFICANT CHANGE UP (ref 12–78)
AST SERPL-CCNC: 53 U/L — HIGH (ref 15–37)
BILIRUB DIRECT SERPL-MCNC: 0.1 MG/DL — SIGNIFICANT CHANGE UP (ref 0–0.3)
BILIRUB INDIRECT FLD-MCNC: 0.3 MG/DL — SIGNIFICANT CHANGE UP (ref 0.2–1)
BILIRUB SERPL-MCNC: 0.4 MG/DL — SIGNIFICANT CHANGE UP (ref 0.2–1.2)
PROT SERPL-MCNC: 6.1 GM/DL — SIGNIFICANT CHANGE UP (ref 6–8.3)

## 2022-12-22 PROCEDURE — 99232 SBSQ HOSP IP/OBS MODERATE 35: CPT

## 2022-12-22 PROCEDURE — 99233 SBSQ HOSP IP/OBS HIGH 50: CPT

## 2022-12-22 RX ADMIN — PRIMIDONE 50 MILLIGRAM(S): 250 TABLET ORAL at 01:01

## 2022-12-22 RX ADMIN — PRIMIDONE 50 MILLIGRAM(S): 250 TABLET ORAL at 17:04

## 2022-12-22 RX ADMIN — PRIMIDONE 50 MILLIGRAM(S): 250 TABLET ORAL at 06:05

## 2022-12-22 RX ADMIN — TIOTROPIUM BROMIDE 2 PUFF(S): 18 CAPSULE ORAL; RESPIRATORY (INHALATION) at 08:44

## 2022-12-22 RX ADMIN — PRIMIDONE 50 MILLIGRAM(S): 250 TABLET ORAL at 12:33

## 2022-12-22 RX ADMIN — Medication 600 MILLIGRAM(S): at 09:53

## 2022-12-22 RX ADMIN — Medication 600 MILLIGRAM(S): at 21:26

## 2022-12-22 RX ADMIN — ROPINIROLE 0.25 MILLIGRAM(S): 8 TABLET, FILM COATED, EXTENDED RELEASE ORAL at 09:53

## 2022-12-22 RX ADMIN — ENOXAPARIN SODIUM 40 MILLIGRAM(S): 100 INJECTION SUBCUTANEOUS at 06:05

## 2022-12-22 RX ADMIN — TRAMADOL HYDROCHLORIDE 50 MILLIGRAM(S): 50 TABLET ORAL at 01:01

## 2022-12-22 RX ADMIN — ZOLPIDEM TARTRATE 5 MILLIGRAM(S): 10 TABLET ORAL at 21:29

## 2022-12-22 RX ADMIN — DULOXETINE HYDROCHLORIDE 60 MILLIGRAM(S): 30 CAPSULE, DELAYED RELEASE ORAL at 09:53

## 2022-12-22 RX ADMIN — Medication 1000 UNIT(S): at 09:53

## 2022-12-22 RX ADMIN — ATORVASTATIN CALCIUM 80 MILLIGRAM(S): 80 TABLET, FILM COATED ORAL at 21:26

## 2022-12-22 RX ADMIN — PRIMIDONE 50 MILLIGRAM(S): 250 TABLET ORAL at 21:25

## 2022-12-22 RX ADMIN — Medication 6 MILLIGRAM(S): at 09:59

## 2022-12-22 RX ADMIN — REMDESIVIR 200 MILLIGRAM(S): 5 INJECTION INTRAVENOUS at 06:05

## 2022-12-22 RX ADMIN — GABAPENTIN 300 MILLIGRAM(S): 400 CAPSULE ORAL at 21:26

## 2022-12-22 RX ADMIN — Medication 81 MILLIGRAM(S): at 09:53

## 2022-12-22 NOTE — PROGRESS NOTE ADULT - SUBJECTIVE AND OBJECTIVE BOX
CC: shortness of breath (18 Dec 2022 10:57)    Patient is a 73 y/o F with PMHx of Emphysema, Breast Ca s/p mastectomy, Melanoma, Ovarian tumor (benign, per pt) Hx of herniated disc, chronic back pain, neuropathy with chronic left foot drop, Essential Tremors presents to  c/o URI like symptoms. Reports of cough, congestion, genearlized malaise. States symptoms started on 12/14. Patient also reports of generalized weakness, reports of perioral tingling. Reports of "curling" of the fingers in the right hand. Patient reports Hx of multiple herniated discs in the "L region" and had MRI done last year at [facility]. Patient denies  headache, dizziness, change in vision, blurry vision, anosmia, ageusia, chest pain, palpitations, shortness of breath, abdominal pain, nausea, vomiting, diarrhea, constipation, change in bowel movement, weight loss, dysuria, urinary frequency, urgency, hematuria, urinary/bowel incontinence or saddle anesthesia.     Medical progress: Waiting for MRI of the brain  Complaints: wekaness resolving  State of mind: normal    REVIEW OF SYSTEMS:  All other review of systems is negative unless indicated above.    PHYSICAL EXAM:  ICU Vital Signs Last 24 Hrs  T(C): 36.6 (21 Dec 2022 08:33), Max: 36.8 (20 Dec 2022 21:10)  T(F): 97.9 (21 Dec 2022 08:33), Max: 98.2 (20 Dec 2022 21:10)  HR: 89 (21 Dec 2022 08:33) (76 - 89)  BP: 102/59 (21 Dec 2022 08:33) (101/69 - 104/57)  BP(mean): --  ABP: --  ABP(mean): --  RR: 18 (21 Dec 2022 08:33) (17 - 18)  SpO2: 93% (21 Dec 2022 08:33) (93% - 95%)    O2 Parameters below as of 21 Dec 2022 08:33  Patient On (Oxygen Delivery Method): room air  O2 Flow (L/min): 14        General: Deconditioned, frail.   Eyes: PERRLA, EOMI; conjunctiva and sclera clear  Head: Normocephalic; atraumatic  ENMT: No nasal discharge; airway clear  Neck: Supple; non tender; no masses  Respiratory: No wheezes, rales or rhonchi  Cardiovascular: Regular rate and rhythm. S1 and S2 Normal; No murmurs, gallops or rubs  Gastrointestinal: Soft non-tender non-distended; Normal bowel sounds  Genitourinary: No  suprapubic  tenderness  Extremities: Normal range of motion, No clubbing, cyanosis or edema  Vascular: Peripheral pulses palpable 2+ bilaterally  Neurological: (R) motor deficits  Skin: Warm and dry. No acute rash  Lymph Nodes: No acute cervical adenopathy  Musculoskeletal: Normal muscle tone, without deformities  Psychiatric: Cooperative and appropriate      CBC Full  -  ( 20 Dec 2022 07:48 )  WBC Count : 6.50 K/uL  RBC Count : 4.33 M/uL  Hemoglobin : 13.1 g/dL  Hematocrit : 39.0 %  Platelet Count - Automated : 200 K/uL  Mean Cell Volume : 90.1 fl  Mean Cell Hemoglobin : 30.3 pg  Mean Cell Hemoglobin Concentration : 33.6 gm/dL  Auto Neutrophil # : x  Auto Lymphocyte # : x  Auto Monocyte # : x  Auto Eosinophil # : x  Auto Basophil # : x  Auto Neutrophil % : x  Auto Lymphocyte % : x  Auto Monocyte % : x  Auto Eosinophil % : x  Auto Basophil % : x        12-20    138  |  106  |  22  ----------------------------<  94  4.2   |  25  |  0.61    Ca    8.2<L>      20 Dec 2022 07:48    TPro  6.2  /  Alb  3.0<L>  /  TBili  0.3  /  DBili  0.1  /  AST  39<H>  /  ALT  27  /  AlkPhos  58  12-20      73 y/o F with PMHx of Emphysema, Breast Ca s/p mastectomy, Melanoma, Ovarian tumor (benign, per pt) Hx of herniated disc, chronic back pain, neuropathy with chronic left foot drop, Essential Tremors admitted for:     # COVID-19  URI   - No respiratory failure, Pts pulse ox above 90%, tolerates RA  - Monitor pulse ox, supplemental oxygen PRN with target SpO2>92  - Started on Remdesvir + Decadron, will consider 3  days course unless will become hypoxic   - C/w albuterol  PRN   - antipyretics and antiemetics PRN   - check proinflammatory markers     # Hyponatremia   - Na -  138    # Worsening LLE weakness,  new RLE/ R hand weakness with L facial droop - rule out CVA  - s/p LP in ED   - CSF studies d/w neuro,  not suggestive of infection or GBS, will need to r/o stroke   - NIHSS 4   - neurochecks q4   - C/w ASA  - C/w Lipitor   - pending MRI/MRA   - Speech and swallow eval   - care discussed with Dr. Duran    # Hypokalemia  - replace PO    # Depression. Neuropathy,  foot drop   -Duloxetine   -Gabapentin   -Ropinirole    #Emphysema, stable   -on Anoro Ellipta     #Essential Tremor   -Primidone     #Insomnia   -Zolpidem     #DVT Prophylaxis   -Lovenox     #DIET   -DASH/TLC

## 2022-12-22 NOTE — PROGRESS NOTE ADULT - SUBJECTIVE AND OBJECTIVE BOX
Interval History:  12/22/22: No new complaints. Feels like she is improving.    MEDICATIONS  (STANDING):  aspirin enteric coated 81 milliGRAM(s) Oral daily  atorvastatin 80 milliGRAM(s) Oral at bedtime  cholecalciferol 1000 Unit(s) Oral daily  dexAMETHasone  Injectable 6 milliGRAM(s) IV Push daily  DULoxetine 60 milliGRAM(s) Oral daily  enoxaparin Injectable 40 milliGRAM(s) SubCutaneous every 24 hours  gabapentin 300 milliGRAM(s) Oral at bedtime  guaiFENesin  milliGRAM(s) Oral every 12 hours  primidone 50 milliGRAM(s) Oral four times a day  rOPINIRole 0.25 milliGRAM(s) Oral daily  tiotropium 2.5 MICROgram(s) Inhaler 2 Puff(s) Inhalation daily    MEDICATIONS  (PRN):  albuterol    90 MICROgram(s) HFA Inhaler 2 Puff(s) Inhalation every 6 hours PRN Shortness of Breath and/or Wheezing  traMADol 50 milliGRAM(s) Oral every 6 hours PRN Moderate Pain (4 - 6)  zolpidem 5 milliGRAM(s) Oral at bedtime PRN Insomnia  zolpidem 5 milliGRAM(s) Oral at bedtime PRN Insomnia      Allergies    No Known Allergies    Intolerances        PHYSICAL EXAM:  Vital Signs Last 24 Hrs  T(F): 98.1 (12-22-22 @ 08:19)  HR: 80 (12-22-22 @ 08:46)  BP: 100/62 (12-22-22 @ 08:19)  RR: 18 (12-22-22 @ 08:19)        GENERAL: NAD, well-groomed  HEAD:  Atraumatic, Normocephalic  Neuro:  HF: A x O x 3. Appropriately interactive, normal affect. Speech fluent, No Aphasia or paraphasic errors. Naming /repetition intact   CN: ADAN, EOMI, VFF, facial sensation normal, +flattened left NL fold, tongue midline, Palate moves equally, SCM equal bilaterally  Motor: 4/5 right hand , no pronator drift, RLE 4/5, LUE 5/5, LLE 5/5 proximally, 2/5 left foot dorsiflexion (chronic)  Sens: Intact to light touch   Reflexes: 2+ in LUE/LLE (except for 0 ankle jerk), relatively brisk in RUE/RLE, downgoing toe on left, mute toe on right  Coord:  No dysmetria    LABS:        TPro  6.1  /  Alb  3.0<L>  /  TBili  0.4  /  DBili  0.1  /  AST  53<H>  /  ALT  40  /  AlkPhos  63  12-22          RADIOLOGY & ADDITIONAL STUDIES:    CT head 12/17/22:  No acute intracranial hemorrhage, vasogenic edema, extra-axial collection   or hydrocephalus. Tiny chronic lacunar infarcts head of the right caudate   nucleus and right corona radiata.

## 2022-12-23 LAB
ALBUMIN SERPL ELPH-MCNC: 2.8 G/DL — LOW (ref 3.3–5)
ALP SERPL-CCNC: 68 U/L — SIGNIFICANT CHANGE UP (ref 40–120)
ALT FLD-CCNC: 41 U/L — SIGNIFICANT CHANGE UP (ref 12–78)
AST SERPL-CCNC: 48 U/L — HIGH (ref 15–37)
BILIRUB DIRECT SERPL-MCNC: 0.1 MG/DL — SIGNIFICANT CHANGE UP (ref 0–0.3)
BILIRUB INDIRECT FLD-MCNC: 0.3 MG/DL — SIGNIFICANT CHANGE UP (ref 0.2–1)
BILIRUB SERPL-MCNC: 0.4 MG/DL — SIGNIFICANT CHANGE UP (ref 0.2–1.2)
PROT SERPL-MCNC: 6 GM/DL — SIGNIFICANT CHANGE UP (ref 6–8.3)

## 2022-12-23 PROCEDURE — 99233 SBSQ HOSP IP/OBS HIGH 50: CPT

## 2022-12-23 PROCEDURE — 70544 MR ANGIOGRAPHY HEAD W/O DYE: CPT | Mod: 26,59

## 2022-12-23 PROCEDURE — 70547 MR ANGIOGRAPHY NECK W/O DYE: CPT | Mod: 26

## 2022-12-23 PROCEDURE — 70551 MRI BRAIN STEM W/O DYE: CPT | Mod: 26

## 2022-12-23 PROCEDURE — 99232 SBSQ HOSP IP/OBS MODERATE 35: CPT

## 2022-12-23 RX ADMIN — ENOXAPARIN SODIUM 40 MILLIGRAM(S): 100 INJECTION SUBCUTANEOUS at 05:43

## 2022-12-23 RX ADMIN — Medication 600 MILLIGRAM(S): at 09:50

## 2022-12-23 RX ADMIN — PRIMIDONE 50 MILLIGRAM(S): 250 TABLET ORAL at 05:42

## 2022-12-23 RX ADMIN — GABAPENTIN 300 MILLIGRAM(S): 400 CAPSULE ORAL at 21:44

## 2022-12-23 RX ADMIN — ATORVASTATIN CALCIUM 80 MILLIGRAM(S): 80 TABLET, FILM COATED ORAL at 21:44

## 2022-12-23 RX ADMIN — PRIMIDONE 50 MILLIGRAM(S): 250 TABLET ORAL at 17:20

## 2022-12-23 RX ADMIN — PRIMIDONE 50 MILLIGRAM(S): 250 TABLET ORAL at 13:56

## 2022-12-23 RX ADMIN — ROPINIROLE 0.25 MILLIGRAM(S): 8 TABLET, FILM COATED, EXTENDED RELEASE ORAL at 09:50

## 2022-12-23 RX ADMIN — Medication 1000 UNIT(S): at 09:50

## 2022-12-23 RX ADMIN — DULOXETINE HYDROCHLORIDE 60 MILLIGRAM(S): 30 CAPSULE, DELAYED RELEASE ORAL at 09:50

## 2022-12-23 RX ADMIN — PRIMIDONE 50 MILLIGRAM(S): 250 TABLET ORAL at 21:45

## 2022-12-23 RX ADMIN — Medication 600 MILLIGRAM(S): at 21:44

## 2022-12-23 RX ADMIN — ZOLPIDEM TARTRATE 5 MILLIGRAM(S): 10 TABLET ORAL at 21:44

## 2022-12-23 RX ADMIN — Medication 81 MILLIGRAM(S): at 09:50

## 2022-12-23 RX ADMIN — TIOTROPIUM BROMIDE 2 PUFF(S): 18 CAPSULE ORAL; RESPIRATORY (INHALATION) at 08:07

## 2022-12-23 NOTE — PROGRESS NOTE ADULT - SUBJECTIVE AND OBJECTIVE BOX
Interval History:  12/23/22: No new events. Thinks she has some subtle improvement.    MEDICATIONS  (STANDING):  aspirin enteric coated 81 milliGRAM(s) Oral daily  atorvastatin 80 milliGRAM(s) Oral at bedtime  cholecalciferol 1000 Unit(s) Oral daily  DULoxetine 60 milliGRAM(s) Oral daily  enoxaparin Injectable 40 milliGRAM(s) SubCutaneous every 24 hours  gabapentin 300 milliGRAM(s) Oral at bedtime  guaiFENesin  milliGRAM(s) Oral every 12 hours  primidone 50 milliGRAM(s) Oral four times a day  rOPINIRole 0.25 milliGRAM(s) Oral daily  tiotropium 2.5 MICROgram(s) Inhaler 2 Puff(s) Inhalation daily    MEDICATIONS  (PRN):  albuterol    90 MICROgram(s) HFA Inhaler 2 Puff(s) Inhalation every 6 hours PRN Shortness of Breath and/or Wheezing  traMADol 50 milliGRAM(s) Oral every 6 hours PRN Moderate Pain (4 - 6)  zolpidem 5 milliGRAM(s) Oral at bedtime PRN Insomnia  zolpidem 5 milliGRAM(s) Oral at bedtime PRN Insomnia      Allergies    No Known Allergies    Intolerances        PHYSICAL EXAM:  Vital Signs Last 24 Hrs  T(F): 98.1 (12-23-22 @ 08:10)  HR: 86 (12-23-22 @ 08:10)  BP: 100/58 (12-23-22 @ 08:10)  RR: 18 (12-23-22 @ 08:10)        LABS:        TPro  6.0  /  Alb  2.8<L>  /  TBili  0.4  /  DBili  0.1  /  AST  48<H>  /  ALT  41  /  AlkPhos  68  12-23          RADIOLOGY & ADDITIONAL STUDIES:    CT head 12/17/22:  No acute intracranial hemorrhage, vasogenic edema, extra-axial collection   or hydrocephalus. Tiny chronic lacunar infarcts head of the right caudate   nucleus and right corona radiata.

## 2022-12-24 LAB
ALBUMIN SERPL ELPH-MCNC: 2.9 G/DL — LOW (ref 3.3–5)
ALP SERPL-CCNC: 73 U/L — SIGNIFICANT CHANGE UP (ref 40–120)
ALT FLD-CCNC: 56 U/L — SIGNIFICANT CHANGE UP (ref 12–78)
AST SERPL-CCNC: 60 U/L — HIGH (ref 15–37)
BILIRUB DIRECT SERPL-MCNC: 0.2 MG/DL — SIGNIFICANT CHANGE UP (ref 0–0.3)
BILIRUB INDIRECT FLD-MCNC: 0.3 MG/DL — SIGNIFICANT CHANGE UP (ref 0.2–1)
BILIRUB SERPL-MCNC: 0.5 MG/DL — SIGNIFICANT CHANGE UP (ref 0.2–1.2)
PROT SERPL-MCNC: 5.8 GM/DL — LOW (ref 6–8.3)

## 2022-12-24 PROCEDURE — 99233 SBSQ HOSP IP/OBS HIGH 50: CPT

## 2022-12-24 PROCEDURE — 99232 SBSQ HOSP IP/OBS MODERATE 35: CPT

## 2022-12-24 PROCEDURE — 72141 MRI NECK SPINE W/O DYE: CPT | Mod: 26

## 2022-12-24 RX ADMIN — ROPINIROLE 0.25 MILLIGRAM(S): 8 TABLET, FILM COATED, EXTENDED RELEASE ORAL at 10:14

## 2022-12-24 RX ADMIN — Medication 600 MILLIGRAM(S): at 21:18

## 2022-12-24 RX ADMIN — PRIMIDONE 50 MILLIGRAM(S): 250 TABLET ORAL at 17:39

## 2022-12-24 RX ADMIN — ATORVASTATIN CALCIUM 80 MILLIGRAM(S): 80 TABLET, FILM COATED ORAL at 21:17

## 2022-12-24 RX ADMIN — Medication 1000 UNIT(S): at 10:13

## 2022-12-24 RX ADMIN — DULOXETINE HYDROCHLORIDE 60 MILLIGRAM(S): 30 CAPSULE, DELAYED RELEASE ORAL at 10:13

## 2022-12-24 RX ADMIN — PRIMIDONE 50 MILLIGRAM(S): 250 TABLET ORAL at 14:22

## 2022-12-24 RX ADMIN — ENOXAPARIN SODIUM 40 MILLIGRAM(S): 100 INJECTION SUBCUTANEOUS at 05:43

## 2022-12-24 RX ADMIN — Medication 81 MILLIGRAM(S): at 10:13

## 2022-12-24 RX ADMIN — PRIMIDONE 50 MILLIGRAM(S): 250 TABLET ORAL at 05:43

## 2022-12-24 RX ADMIN — Medication 600 MILLIGRAM(S): at 10:13

## 2022-12-24 RX ADMIN — GABAPENTIN 300 MILLIGRAM(S): 400 CAPSULE ORAL at 21:17

## 2022-12-24 NOTE — PROGRESS NOTE ADULT - SUBJECTIVE AND OBJECTIVE BOX
CC: shortness of breath (18 Dec 2022 10:57)    Patient is a 73 y/o F with PMHx of Emphysema, Breast Ca s/p mastectomy, Melanoma, Ovarian tumor (benign, per pt) Hx of herniated disc, chronic back pain, neuropathy with chronic left foot drop, Essential Tremors presents to  c/o URI like symptoms. Reports of cough, congestion, genearlized malaise. States symptoms started on 12/14. Patient also reports of generalized weakness, reports of perioral tingling. Reports of "curling" of the fingers in the right hand. Patient reports Hx of multiple herniated discs in the "L region" and had MRI done last year at [facility]. Patient denies  headache, dizziness, change in vision, blurry vision, anosmia, ageusia, chest pain, palpitations, shortness of breath, abdominal pain, nausea, vomiting, diarrhea, constipation, change in bowel movement, weight loss, dysuria, urinary frequency, urgency, hematuria, urinary/bowel incontinence or saddle anesthesia.     Medical progress: Denies any HA, CP, SOB. No fevers, chills or shakes. Labs and vitals reviewed.   Complaints: weakness  State of mind: normal    REVIEW OF SYSTEMS:  All other review of systems is negative unless indicated above.    PHYSICAL EXAM:  ICU Vital Signs Last 24 Hrs  T(C): 36.6 (21 Dec 2022 08:33), Max: 36.8 (20 Dec 2022 21:10)  T(F): 97.9 (21 Dec 2022 08:33), Max: 98.2 (20 Dec 2022 21:10)  HR: 89 (21 Dec 2022 08:33) (76 - 89)  BP: 102/59 (21 Dec 2022 08:33) (101/69 - 104/57)  RR: 18 (21 Dec 2022 08:33) (17 - 18)  SpO2: 93% (21 Dec 2022 08:33) (93% - 95%)  General: Significant improvement in weakness  Eyes: PERRLA, EOMI; conjunctiva and sclera clear  Head: Normocephalic; atraumatic  ENMT: No nasal discharge; airway clear  Neck: Supple; non tender; no masses  Respiratory: No wheezes, rales or rhonchi  Cardiovascular: Regular rate and rhythm. S1 and S2 Normal; No murmurs, gallops or rubs  Gastrointestinal: Soft non-tender non-distended; Normal bowel sounds  Genitourinary: No  suprapubic  tenderness  Extremities: Normal range of motion, No clubbing, cyanosis or edema  Vascular: Peripheral pulses palpable 2+ bilaterally  Neurological: (R) motor deficits  Skin: Warm and dry. No acute rash  Lymph Nodes: No acute cervical adenopathy  Musculoskeletal: Normal muscle tone, without deformities  Psychiatric: Cooperative and appropriate    Labs:   CBC Full  -  ( 20 Dec 2022 07:48 )  WBC Count : 6.50 K/uL  RBC Count : 4.33 M/uL  Hemoglobin : 13.1 g/dL  Hematocrit : 39.0 %  Platelet Count - Automated : 200 K/uL    138  |  106  |  22  ----------------------------<  94  4.2   |  25  |  0.61    Ca    8.2<L>      20 Dec 2022 07:48    TPro  6.2  /  Alb  3.0<L>  /  TBili  0.3  /  DBili  0.1  /  AST  39<H>  /  ALT  27  /  AlkPhos  58  12-20      73 y/o F with PMHx of Emphysema, Breast Ca s/p mastectomy, Melanoma, Ovarian tumor (benign, per pt) Hx of herniated disc, chronic back pain, neuropathy with chronic left foot drop, Essential Tremors admitted for:     # COVID-19  URI   - No respiratory failure, Pts pulse ox above 90%, tolerates RA  - Monitor pulse ox, supplemental oxygen PRN with target SpO2>92  - Started on Remdesvir + Decadron, will consider 3  days course unless will become hypoxic   - C/w albuterol  PRN   - antipyretics and antiemetics PRN   - check proinflammatory markers     # Hyponatremia   - Na -  138    # Worsening LLE weakness,  new RLE/ R hand weakness with L facial droop - rule out CVA  - s/p LP in ED   - CSF studies d/w neuro,  not suggestive of infection or GBS, will need to r/o stroke   - NIHSS 4   - C/w ASA  - C/w Lipitor   - MRI of the brain - no stroke  - Speech and swallow eval   - care discussed with Dr. Duran    # Hypokalemia  - replace PO    # Depression. Neuropathy,  foot drop   -Duloxetine   -Gabapentin   -Ropinirole    #Emphysema, stable   -on Anoro Ellipta     #Essential Tremor   -Primidone     #Insomnia   -Zolpidem     #DVT Prophylaxis   -Lovenox     #DIET   -DASH/TLC  CC: shortness of breath (18 Dec 2022 10:57)    Patient is a 73 y/o F with PMHx of Emphysema, Breast Ca s/p mastectomy, Melanoma, Ovarian tumor (benign, per pt) Hx of herniated disc, chronic back pain, neuropathy with chronic left foot drop, Essential Tremors presents to  c/o URI like symptoms. Reports of cough, congestion, genearlized malaise. States symptoms started on 12/14. Patient also reports of generalized weakness, reports of perioral tingling. Reports of "curling" of the fingers in the right hand. Patient reports Hx of multiple herniated discs in the "L region" and had MRI done last year at [facility]. Patient denies  headache, dizziness, change in vision, blurry vision, anosmia, ageusia, chest pain, palpitations, shortness of breath, abdominal pain, nausea, vomiting, diarrhea, constipation, change in bowel movement, weight loss, dysuria, urinary frequency, urgency, hematuria, urinary/bowel incontinence or saddle anesthesia.     Medical progress: Denies any HA, CP, SOB. No fevers, chills or shakes. Labs and vitals reviewed. Patient slightly lower BPs this am -  no dizziness. Got patient out of bed to chair without any help. Will monitor low BPs  Complaints: weakness  State of mind: normal  Outpatient follow up as an outpatient    REVIEW OF SYSTEMS:  All other review of systems is negative unless indicated above.    PHYSICAL EXAM:  ICU Vital Signs Last 24 Hrs  T(C): 36.6 (21 Dec 2022 08:33), Max: 36.8 (20 Dec 2022 21:10)  T(F): 97.9 (21 Dec 2022 08:33), Max: 98.2 (20 Dec 2022 21:10)  HR: 89 (21 Dec 2022 08:33) (76 - 89)  BP: 102/59 (21 Dec 2022 08:33) (101/69 - 104/57)  RR: 18 (21 Dec 2022 08:33) (17 - 18)  SpO2: 93% (21 Dec 2022 08:33) (93% - 95%)  General: Significant improvement in weakness  Eyes: PERRLA, EOMI; conjunctiva and sclera clear  Head: Normocephalic; atraumatic  ENMT: No nasal discharge; airway clear  Neck: Supple; non tender; no masses  Respiratory: No wheezes, rales or rhonchi  Cardiovascular: Regular rate and rhythm. S1 and S2 Normal; No murmurs, gallops or rubs  Gastrointestinal: Soft non-tender non-distended; Normal bowel sounds  Genitourinary: No  suprapubic  tenderness  Extremities: Normal range of motion, No clubbing, cyanosis or edema  Vascular: Peripheral pulses palpable 2+ bilaterally  Neurological: (R) motor deficits  Skin: Warm and dry. No acute rash  Lymph Nodes: No acute cervical adenopathy  Musculoskeletal: Normal muscle tone, without deformities  Psychiatric: Cooperative and appropriate    Labs:   CBC Full  -  ( 20 Dec 2022 07:48 )  WBC Count : 6.50 K/uL  RBC Count : 4.33 M/uL  Hemoglobin : 13.1 g/dL  Hematocrit : 39.0 %  Platelet Count - Automated : 200 K/uL    138  |  106  |  22  ----------------------------<  94  4.2   |  25  |  0.61    Ca    8.2<L>      20 Dec 2022 07:48    TPro  6.2  /  Alb  3.0<L>  /  TBili  0.3  /  DBili  0.1  /  AST  39<H>  /  ALT  27  /  AlkPhos  58  12-20      73 y/o F with PMHx of Emphysema, Breast Ca s/p mastectomy, Melanoma, Ovarian tumor (benign, per pt) Hx of herniated disc, chronic back pain, neuropathy with chronic left foot drop, Essential Tremors admitted for:     # COVID-19  URI   - No respiratory failure, Pts pulse ox above 90%, tolerates RA  - Monitor pulse ox, supplemental oxygen PRN with target SpO2>92  - Started on Remdesvir + Decadron, will consider 3  days course unless will become hypoxic   - C/w albuterol  PRN   - antipyretics and antiemetics PRN   - check proinflammatory markers     # Hyponatremia   - Na -  138    # Worsening LLE weakness,  new RLE/ R hand weakness with L facial droop - rule out CVA  - s/p LP in ED   - CSF studies d/w neuro,  not suggestive of infection or GBS, will need to r/o stroke   - NIHSS 4   - C/w ASA  - C/w Lipitor   - MRI of the brain - no stroke  - Speech and swallow eval   - care discussed with Dr. Duran    # Hypokalemia  - replace PO    # Depression. Neuropathy,  foot drop   -Duloxetine   -Gabapentin   -Ropinirole    #Emphysema, stable   -on Anoro Ellipta     #Essential Tremor   -Primidone     #Insomnia   -Zolpidem     #DVT Prophylaxis   -Lovenox     #DIET   -DASH/TLC

## 2022-12-24 NOTE — PROGRESS NOTE ADULT - SUBJECTIVE AND OBJECTIVE BOX
Interval History:  12/24/22: No new events. Reports that she is feeling better.    MEDICATIONS  (STANDING):  aspirin enteric coated 81 milliGRAM(s) Oral daily  atorvastatin 80 milliGRAM(s) Oral at bedtime  cholecalciferol 1000 Unit(s) Oral daily  DULoxetine 60 milliGRAM(s) Oral daily  enoxaparin Injectable 40 milliGRAM(s) SubCutaneous every 24 hours  gabapentin 300 milliGRAM(s) Oral at bedtime  guaiFENesin  milliGRAM(s) Oral every 12 hours  primidone 50 milliGRAM(s) Oral four times a day  rOPINIRole 0.25 milliGRAM(s) Oral daily  tiotropium 2.5 MICROgram(s) Inhaler 2 Puff(s) Inhalation daily    MEDICATIONS  (PRN):  albuterol    90 MICROgram(s) HFA Inhaler 2 Puff(s) Inhalation every 6 hours PRN Shortness of Breath and/or Wheezing  traMADol 50 milliGRAM(s) Oral every 6 hours PRN Moderate Pain (4 - 6)  zolpidem 5 milliGRAM(s) Oral at bedtime PRN Insomnia  zolpidem 5 milliGRAM(s) Oral at bedtime PRN Insomnia      Allergies    No Known Allergies    Intolerances        PHYSICAL EXAM:  Vital Signs Last 24 Hrs  T(F): 97.7 (12-24-22 @ 10:33)  HR: 77 (12-24-22 @ 10:33)  BP: 87/58 (12-24-22 @ 10:33)  RR: 18 (12-24-22 @ 10:33)    GENERAL: NAD, well-groomed  HEAD:  Atraumatic, Normocephalic  Neuro:  HF: A x O x 3. Appropriately interactive, normal affect. Speech fluent, No Aphasia or paraphasic errors. Naming /repetition intact   CN: ADAN, EOMI, VFF, facial sensation normal, +flattened left NL fold, tongue midline, Palate moves equally, SCM equal bilaterally  Motor: 4/5 right hand , no pronator drift, RLE 5-/5, LUE 5/5, LLE 5/5 proximally, 2/5 left foot dorsiflexion (chronic)  Sens: Intact to light touch   Coord:  No dysmetria        LABS:        TPro  5.8<L>  /  Alb  2.9<L>  /  TBili  0.5  /  DBili  0.2  /  AST  60<H>  /  ALT  56  /  AlkPhos  73  12-24          RADIOLOGY & ADDITIONAL STUDIES:    MR head 12/17/22:  No acute intracranial hemorrhage, vasogenic edema, extra-axial collection   or hydrocephalus. Tiny chronic lacunar infarcts head of the right caudate   nucleus and right corona radiata.    MR head, MRA head, MRA neck 12/23/22:  CT HEAD  1. No acute infarct, hemorrhage, mass or mass effect.  2. Mild bihemispheric altered white matter signal; a nonspecific finding   which statistically reflects chronic microvascular ischemic change.  3. Additional findings described in detail above.    MRA HEAD/NECK  1. No evidence of hemodynamically significant stenosis, aneurysm or   vascular malformation.  2. Question of medialization left true vocal fold. Correlate for signs of   vocal cord paralysis and additional investigation as warranted.    MR cervical 12/24/22:   Degenerative changes

## 2022-12-25 ENCOUNTER — TRANSCRIPTION ENCOUNTER (OUTPATIENT)
Age: 72
End: 2022-12-25

## 2022-12-25 VITALS
OXYGEN SATURATION: 100 % | RESPIRATION RATE: 18 BRPM | HEART RATE: 86 BPM | DIASTOLIC BLOOD PRESSURE: 62 MMHG | TEMPERATURE: 96 F | SYSTOLIC BLOOD PRESSURE: 95 MMHG

## 2022-12-25 LAB
ALBUMIN SERPL ELPH-MCNC: 3.4 G/DL — SIGNIFICANT CHANGE UP (ref 3.3–5)
ALP SERPL-CCNC: 80 U/L — SIGNIFICANT CHANGE UP (ref 40–120)
ALT FLD-CCNC: 72 U/L — SIGNIFICANT CHANGE UP (ref 12–78)
AST SERPL-CCNC: 66 U/L — HIGH (ref 15–37)
BILIRUB DIRECT SERPL-MCNC: 0.2 MG/DL — SIGNIFICANT CHANGE UP (ref 0–0.3)
BILIRUB INDIRECT FLD-MCNC: 0.4 MG/DL — SIGNIFICANT CHANGE UP (ref 0.2–1)
BILIRUB SERPL-MCNC: 0.6 MG/DL — SIGNIFICANT CHANGE UP (ref 0.2–1.2)
PROT SERPL-MCNC: 6.8 GM/DL — SIGNIFICANT CHANGE UP (ref 6–8.3)

## 2022-12-25 PROCEDURE — 99238 HOSP IP/OBS DSCHRG MGMT 30/<: CPT

## 2022-12-25 PROCEDURE — 99232 SBSQ HOSP IP/OBS MODERATE 35: CPT

## 2022-12-25 RX ADMIN — ENOXAPARIN SODIUM 40 MILLIGRAM(S): 100 INJECTION SUBCUTANEOUS at 06:11

## 2022-12-25 RX ADMIN — DULOXETINE HYDROCHLORIDE 60 MILLIGRAM(S): 30 CAPSULE, DELAYED RELEASE ORAL at 09:17

## 2022-12-25 RX ADMIN — TIOTROPIUM BROMIDE 2 PUFF(S): 18 CAPSULE ORAL; RESPIRATORY (INHALATION) at 12:19

## 2022-12-25 RX ADMIN — Medication 1000 UNIT(S): at 09:17

## 2022-12-25 RX ADMIN — Medication 81 MILLIGRAM(S): at 09:17

## 2022-12-25 RX ADMIN — ROPINIROLE 0.25 MILLIGRAM(S): 8 TABLET, FILM COATED, EXTENDED RELEASE ORAL at 09:17

## 2022-12-25 RX ADMIN — Medication 600 MILLIGRAM(S): at 09:17

## 2022-12-25 RX ADMIN — ZOLPIDEM TARTRATE 5 MILLIGRAM(S): 10 TABLET ORAL at 00:04

## 2022-12-25 RX ADMIN — PRIMIDONE 50 MILLIGRAM(S): 250 TABLET ORAL at 05:11

## 2022-12-25 RX ADMIN — PRIMIDONE 50 MILLIGRAM(S): 250 TABLET ORAL at 00:04

## 2022-12-25 NOTE — DISCHARGE NOTE PROVIDER - NSDCMRMEDTOKEN_GEN_ALL_CORE_FT
Albuterol (Eqv-ProAir HFA) 90 mcg/inh inhalation aerosol: 2 puff(s) inhaled every 6 hours, As Needed  Anoro Ellipta 62.5 mcg-25 mcg/inh inhalation powder: 1 puff(s) inhaled once a day  Aspir 81 oral delayed release tablet: 1 tab(s) orally once a day  atorvastatin 10 mg oral tablet: 1 tab(s) orally once a day  DULoxetine 60 mg oral delayed release capsule: 1 cap(s) orally once a day  gabapentin 300 mg oral capsule: 1 cap(s) orally once (at bedtime)  primidone 50 mg oral tablet: 1 tab(s) orally 4 times a day  rOPINIRole 0.25 mg oral tablet: 1 tab(s) orally once a day  traMADol 50 mg oral tablet: 1 tab(s) orally every 6 hours, As Needed  Vitamin D3 25 mcg (1000 intl units) oral tablet: 1 tab(s) orally once a day  zolpidem 10 mg oral tablet: 1 tab(s) orally once a day (at bedtime)

## 2022-12-25 NOTE — DISCHARGE NOTE PROVIDER - NSDCFUADDINST_GEN_ALL_CORE_FT
All the medical records have been provided to you on the paper format -  please bring it to your primary care physician

## 2022-12-25 NOTE — PROGRESS NOTE ADULT - ASSESSMENT
71 y/o Female with h/o Emphysema, Breast Ca s/p mastectomy, Melanoma, Ovarian tumor (benign, per pt), lumbar herniated disc, chronic back pain, neuropathy with chronic left foot drop, essential tremors was admitted on 12/17 for URI like symptoms. Reports of cough, congestion, generalized malaise. States symptoms started on 12/14. Patient also reports of generalized weakness, reports of perioral tingling. Reports of "curling" of the fingers in the right hand. Patient denies  headache, dizziness, change in vision, blurry vision, anosmia, ageusia, chest pain, palpitations, abdominal pain, nausea, vomiting, diarrhea, constipation, change in bowel movement, weight loss, dysuria, urinary frequency, urgency, hematuria, urinary/bowel incontinence or saddle anesthesia. In ER she was noted afebrile and an LP was performed as there was concern for ascending weakness/paralysis. A COVID-19 PCR test was detected.     1. COVID-19 viral syndrome. ?possible early multifocal pneumonia. Breast Ca. Melanoma. Ovarian tumor. COPD.  -difficulty walking  -neurological evaluation in progress  -CSF c/s noted  -on remdesivir protocol # 2  -tolerating abx well so far; no side effects noted  -plan for MRI brain  -O2 therapy  -steroids  -AC  -droplet isolation  -respiratory care  -continue antiviral therapy  -monitor temps  -f/u CBC  -supportive care  2. Other issues:   -care per medicine    
71 yo woman with acute COVID infection, reporting symptoms of a right hemiparesis since Friday, and exam suggestive of an acute ischemic stroke (apparent crossed findings of left facial weakness and right hemiparesis which might suggest brainstem infarct).    Initially there was concern for GBS. LP was performed, CSF was unremarkable.    Recommend:  Aspirin 81mg daily  For stroke scale scores of 5 or less, can do dual antiplatelet therapy x 28 days. Plavix will be added if no contraindications when MRI is done.  Still awaiting MRI brain. If she will be unable to obtain this in the next 24 hours, can get repeat CT head.  Continue statin  BP control  PT/OT appreciated  continue monitoring on telemetry  DVT prophylaxis  Outpatient sleep study to r/o obstructive sleep apnea.    Will follow
71 yo woman with acute COVID infection, reporting symptoms of a right hemiparesis since Friday, and exam suggestive of an acute ischemic stroke (apparent crossed findings of left facial weakness and right hemiparesis which might suggest brainstem infarct).    Initially there was concern for GBS. LP was performed, CSF was unremarkable.    Recommend:  Aspirin 81mg daily  For stroke scale scores of 5 or less, can do dual antiplatelet therapy x 28 days. Plavix will be added if no contraindications when MRI is done.  Still awaiting MRI brain. Should hopefully be done today.    Continue statin  BP control  PT/OT  DVT prophylaxis  Outpatient sleep study to r/o obstructive sleep apnea.    Discussed with Dr. Dan.  Will follow
71 yo woman with acute COVID infection, reporting symptoms of a right hemiparesis since Friday, and exam suggestive of an acute ischemic stroke (apparent crossed findings of left facial weakness and right hemiparesis which might suggest brainstem infarct).    Initially there was concern for GBS. LP was performed, CSF was unremarkable.    Recommend:  Aspirin 81mg daily  For stroke scale scores of 5 or less, can do dual antiplatelet therapy x 28 days. Plavix will be added if no contraindications when MRI is done.  Still awaiting MRI, hopefully today.  Continue statin  BP control  PT/OT appreciated  continue monitoring on telemetry  DVT prophylaxis  Outpatient sleep study to r/o obstructive sleep apnea.    Will follow
73 y/o Female with h/o Emphysema, Breast Ca s/p mastectomy, Melanoma, Ovarian tumor (benign, per pt), lumbar herniated disc, chronic back pain, neuropathy with chronic left foot drop, essential tremors was admitted on 12/17 for URI like symptoms. Reports of cough, congestion, generalized malaise. States symptoms started on 12/14. Patient also reports of generalized weakness, reports of perioral tingling. Reports of "curling" of the fingers in the right hand. Patient denies  headache, dizziness, change in vision, blurry vision, anosmia, ageusia, chest pain, palpitations, abdominal pain, nausea, vomiting, diarrhea, constipation, change in bowel movement, weight loss, dysuria, urinary frequency, urgency, hematuria, urinary/bowel incontinence or saddle anesthesia. In ER she was noted afebrile and an LP was performed as there was concern for ascending weakness/paralysis. A COVID-19 PCR test was detected.     1. COVID-19 viral syndrome. ?possible early multifocal pneumonia. Breast Ca. Melanoma. Ovarian tumor. COPD.  -difficulty walking ?CVA  -neurological evaluation in progress  -CSF c/s noted  -on remdesivir protocol # 4-5  -tolerating abx well so far; no side effects noted  -plan for MRI brain  -complete 5 days of remdesivir  -steroids  -AC  -droplet isolation  -respiratory care  -continue antiviral therapy  -monitor temps  -f/u CBC  -supportive care  2. Other issues:   -care per medicine    
73 yo woman with acute COVID infection, reporting symptoms of a right hemiparesis since Friday, and exam suggestive of an acute ischemic stroke (apparent crossed findings of left facial weakness and right hemiparesis which might suggest brainstem infarct).    Initially there was concern for GBS. LP was performed, CSF was unremarkable.    Recommend:  Aspirin 81mg daily  For stroke scale scores of 5 or less, can do dual antiplatelet therapy x 28 days. Plavix will be added if no contraindications when MRI is done.  Continue statin  BP control  PT/OT  continue monitoring on telemetry  DVT prophylaxis  f/u echo    Discussed with Dr. Welsh.  Will follow
73 yo woman with acute COVID infection, reporting symptoms of a right hemiparesis since Friday, and exam suggestive of an acute ischemic stroke (apparent crossed findings of left facial weakness and right hemiparesis which might suggest brainstem infarct).    Initially there was concern for GBS. LP was performed, CSF was unremarkable.  MRI brain was performed and did not show stroke.  MR Cervical spine does show areas of degenerative changes with some moderate to severe foraminal stenosis on the right.    -Continue aspirin  -BP control  -Outpatient PT/OT  -f/u in office for EMG/NCV  -Outpatient sleep study to evaluate for possible TRACE.    Discussed with Dr. Dan.  Will follow up if additional input is needed from neurology service.  
71 yo woman with acute COVID infection, reporting symptoms of a right hemiparesis since Friday, and exam suggestive of an acute ischemic stroke (apparent crossed findings of left facial weakness and right hemiparesis which might suggest brainstem infarct).    Initially there was concern for GBS. LP was performed, CSF was unremarkable.  MRI brain was performed and did not show stroke.  MR Cervical spine does show areas of degenerative changes with some moderate to severe foraminal stenosis on the right.    -Continue aspirin  -BP control  -Outpatient PT/OT  -f/u for EMG/NCV. She has a neurologist who she has been seeing for many years and will call for an appointment.  -Outpatient sleep study to evaluate for possible TRACE.    Plan is for D/C.  Discussed with Dr. Dan.  
73 yo woman with acute COVID infection, reporting symptoms of a right hemiparesis since Friday, and exam suggestive of an acute ischemic stroke (apparent crossed findings of left facial weakness and right hemiparesis which might suggest brainstem infarct).    Initially there was concern for GBS. LP was performed, CSF was unremarkable.    Recommend:  Aspirin 81mg daily  For stroke scale scores of 5 or less, can do dual antiplatelet therapy x 28 days. Plavix will be added if no contraindications when MRI is done.  Continue statin  BP control  PT/OT  continue monitoring on telemetry  DVT prophylaxis  Patient observed snoring. Will get an outpatient sleep study to evaluate for possible TRACE.    Discussed with Dr. Welsh.  Will follow
71 y/o F with PMHx of Emphysema, Breast Ca s/p mastectomy, Melanoma, Ovarian tumor (benign, per pt) Hx of herniated disc, chronic back pain, neuropathy with chronic left foot drop, Essential Tremors admitted for:         # COVID-19  URI   No respiratory failure, Pts pulse ox above 90%, tolerates RA  Monitor pulse ox, supplemental oxygen PRN with target SpO2>92  F/u Official CXR report   Started on Remdesvir + Decadron, will consider 3  days course unless will become hypoxic   C/w albuterol  PRN   antipyretics and antiemetics PRN   check proinflammatory markers   D/w Dr Mehta    #Hyponatremia   -mild   -trend     #Worsening LLE weakness,  new RLE/ R hand weakness with L facial droop   -s/p LP in ED   - CSF studies d/w neuro,  not suggestive of infection or GBS, will need to R/o stroke   - NIHSS 4   - neurochecks q4   - C/w ASA  - C/w Lipitor   - ECHO  - MRI/MRA ordered   - Speech and swallow eval   - PT neur  - D/w Dr Tafoya      # Hypokalemia  replace PO  Monitor     # Depression. Neuropathy,  foot drop   -Duloxetine   -Gabapentin   -Ropinirole      #Emphysema, stable   -on Anoro Ellipta     #Essential Tremor   -Primidone     #Insomnia   -Zolpidem     #DVT Prophylaxis   -Lovenox     #DIET   -DASH/TLC

## 2022-12-25 NOTE — DISCHARGE NOTE PROVIDER - PROVIDER TOKENS
PROVIDER:[TOKEN:[[id number]]],PROVIDER:[TOKEN:[[id number]]],PROVIDER:[TOKEN:[[id number]]]

## 2022-12-25 NOTE — DISCHARGE NOTE PROVIDER - NSDCFUADDAPPT_GEN_ALL_CORE_FT
Please follow up with Dr. Lopez within 1 week post hospital discharge    please follow up with ortho spine post hospital discharge    please follow up with neurology 
bruising

## 2022-12-25 NOTE — DISCHARGE NOTE PROVIDER - HOSPITAL COURSE
CC: shortness of breath (18 Dec 2022 10:57)    Patient is a 73 y/o F with PMHx of Emphysema, Breast Ca s/p mastectomy, Melanoma, Ovarian tumor (benign, per pt) Hx of herniated disc, chronic back pain, neuropathy with chronic left foot drop, Essential Tremors presents to  c/o URI like symptoms. Reports of cough, congestion, genearlized malaise. States symptoms started on 12/14. Patient also reports of generalized weakness, reports of perioral tingling. Reports of "curling" of the fingers in the right hand. Patient reports Hx of multiple herniated discs in the "L region" and had MRI done last year at [facility]. Patient denies  headache, dizziness, change in vision, blurry vision, anosmia, ageusia, chest pain, palpitations, shortness of breath, abdominal pain, nausea, vomiting, diarrhea, constipation, change in bowel movement, weight loss, dysuria, urinary frequency, urgency, hematuria, urinary/bowel incontinence or saddle anesthesia.     Medical progress: Near complete resolution of (R) sided weakness. Able to ambulate on her own. States her BPs always low. Wants to be discharged at this time as she is doing   well.   Complaints: minimal weakness if any  State of mind: normal    Physical Exam:   GENERAL APPEARANCE:  NAD, hemodynamically stable  T(C): 35.6 (12-25-22 @ 09:10), Max: 36.7 (12-24-22 @ 21:14)  HR: 86 (12-25-22 @ 09:10) (73 - 87)  BP: 95/62 (12-25-22 @ 09:10) (87/58 - 105/61)  RR: 18 (12-25-22 @ 09:10) (18 - 18)  SpO2: 100% (12-25-22 @ 09:10) (94% - 100%)  Wt(kg): --  HEENT:  Head is normocephalic    Skin:  Warm and dry without any rash   NECK:  Supple without lymphadenopathy.   HEART:  Regular rate and rhythm. normal S1 and S2, No M/R/G  LUNGS:  Good ins/exp effort, no W/R/R/C  ABDOMEN:  Soft, nontender, nondistended with good bowel sounds heard  EXTREMITIES:  Without cyanosis, clubbing or edema.   NEUROLOGICAL:  Gross nonfocal        CC: shortness of breath (18 Dec 2022 10:57)    Patient is a 73 y/o F with PMHx of Emphysema, Breast Ca s/p mastectomy, Melanoma, Ovarian tumor (benign, per pt) Hx of herniated disc, chronic back pain, neuropathy with chronic left foot drop, Essential Tremors presents to  c/o URI like symptoms. Reports of cough, congestion, genearlized malaise. States symptoms started on 12/14. Patient also reports of generalized weakness, reports of perioral tingling. Reports of "curling" of the fingers in the right hand. Patient reports Hx of multiple herniated discs in the "L region" and had MRI done last year at [facility]. Patient denies  headache, dizziness, change in vision, blurry vision, anosmia, ageusia, chest pain, palpitations, shortness of breath, abdominal pain, nausea, vomiting, diarrhea, constipation, change in bowel movement, weight loss, dysuria, urinary frequency, urgency, hematuria, urinary/bowel incontinence or saddle anesthesia.     Medical progress: Near complete resolution of (R) sided weakness. Able to ambulate on her own. States her BPs always low. Wants to be discharged at this time as she is doing   well.   Complaints: minimal weakness if any  State of mind: normal    Physical Exam:   GENERAL APPEARANCE:  NAD, hemodynamically stable  T(C): 35.6 (12-25-22 @ 09:10), Max: 36.7 (12-24-22 @ 21:14)  HR: 86 (12-25-22 @ 09:10) (73 - 87)  BP: 95/62 (12-25-22 @ 09:10) (87/58 - 105/61)  RR: 18 (12-25-22 @ 09:10) (18 - 18)  SpO2: 100% (12-25-22 @ 09:10) (94% - 100%)  HEENT:  Head is normocephalic    Skin:  Warm and dry without any rash   NECK:  Supple without lymphadenopathy.   HEART:  Regular rate and rhythm. normal S1 and S2, No M/R/G  LUNGS:  Good ins/exp effort, no W/R/R/C  ABDOMEN:  Soft, nontender, nondistended with good bowel sounds heard  EXTREMITIES:  Without cyanosis, clubbing or edema.   NEUROLOGICAL:  Gross nonfocal

## 2022-12-25 NOTE — PROGRESS NOTE ADULT - SUBJECTIVE AND OBJECTIVE BOX
Interval History:  12/25/22: She is feeling much better.    MEDICATIONS  (STANDING):  aspirin enteric coated 81 milliGRAM(s) Oral daily  atorvastatin 80 milliGRAM(s) Oral at bedtime  cholecalciferol 1000 Unit(s) Oral daily  DULoxetine 60 milliGRAM(s) Oral daily  enoxaparin Injectable 40 milliGRAM(s) SubCutaneous every 24 hours  gabapentin 300 milliGRAM(s) Oral at bedtime  guaiFENesin  milliGRAM(s) Oral every 12 hours  primidone 50 milliGRAM(s) Oral four times a day  rOPINIRole 0.25 milliGRAM(s) Oral daily  tiotropium 2.5 MICROgram(s) Inhaler 2 Puff(s) Inhalation daily    MEDICATIONS  (PRN):  albuterol    90 MICROgram(s) HFA Inhaler 2 Puff(s) Inhalation every 6 hours PRN Shortness of Breath and/or Wheezing  traMADol 50 milliGRAM(s) Oral every 6 hours PRN Moderate Pain (4 - 6)  zolpidem 5 milliGRAM(s) Oral at bedtime PRN Insomnia      Allergies    No Known Allergies    Intolerances        PHYSICAL EXAM:  Vital Signs Last 24 Hrs  T(F): 96 (12-25-22 @ 09:10)  HR: 86 (12-25-22 @ 09:10)  BP: 95/62 (12-25-22 @ 09:10)  RR: 18 (12-25-22 @ 09:10)    GENERAL: NAD, well-groomed  HEAD:  Atraumatic, Normocephalic  Neuro:  HF: A x O x 3. Appropriately interactive, normal affect. Speech fluent, No Aphasia or paraphasic errors. Naming /repetition intact   CN: ADAN, EOMI, VFF, facial sensation normal, +flattened left NL fold, tongue midline, Palate moves equally, SCM equal bilaterally  Motor: 4+/5 right hand , no pronator drift, RLE 5-/5, LUE 5/5, LLE 5/5 proximally, 2/5 left foot dorsiflexion (chronic)  Sens: Intact to light touch   Coord:  No dysmetria  gait: steppage gait for left foot drop    LABS:        TPro  6.8  /  Alb  3.4  /  TBili  0.6  /  DBili  0.2  /  AST  66<H>  /  ALT  72  /  AlkPhos  80  12-25          RADIOLOGY & ADDITIONAL STUDIES:      MR head 12/17/22:  No acute intracranial hemorrhage, vasogenic edema, extra-axial collection   or hydrocephalus. Tiny chronic lacunar infarcts head of the right caudate   nucleus and right corona radiata.    MR head, MRA head, MRA neck 12/23/22:  CT HEAD  1. No acute infarct, hemorrhage, mass or mass effect.  2. Mild bihemispheric altered white matter signal; a nonspecific finding   which statistically reflects chronic microvascular ischemic change.  3. Additional findings described in detail above.    MRA HEAD/NECK  1. No evidence of hemodynamically significant stenosis, aneurysm or   vascular malformation.  2. Question of medialization left true vocal fold. Correlate for signs of   vocal cord paralysis and additional investigation as warranted.    MR cervical 12/24/22:   Degenerative changes

## 2022-12-25 NOTE — DISCHARGE NOTE PROVIDER - CARE PROVIDER_API CALL
Liliana Lopez)  Internal Medicine  [address], [address]  [place] [address]  Phone: [phone]  Fax: [phone]  Follow Up Time:     Ori Ashraf)  Orthopaedic Surgery  [address] [place] [address]  Phone: [phone]  Fax: [phone]  Follow Up Time:     Carlee Duran)  Clinical Neurophysiology; EEGEpilepsy; Neurology; Sleep Medicine  [address] [place] [address], [address]  [place] [address]  Phone: [phone]  Fax: [phone]  Follow Up Time:

## 2022-12-25 NOTE — DISCHARGE NOTE PROVIDER - NSDCCPCAREPLAN_GEN_ALL_CORE_FT
PRINCIPAL DISCHARGE DIAGNOSIS  Diagnosis: 2019 novel coronavirus disease (COVID-19)  Assessment and Plan of Treatment: - resolved  - you do not require any further support with O2  - on chest xray no acute pulmonary disease      SECONDARY DISCHARGE DIAGNOSES  Diagnosis: Lower extremity weakness  Assessment and Plan of Treatment: - resolved  - stroke has been ruled out  - Central spinal fluid analysis did not reveal any growth / PCR did not detect any abnormalitieis  - CT of the brain did show old chronic lacunar infarcts  - your symptoms could be secondary to c4-5 moderate to severe narrowing R neural foramen. We advice you to follow up with spinal doctor.    Diagnosis: Abnormal MRI  Assessment and Plan of Treatment: - abnormal medialization of left vocal cord - could be a vocal cord paralysis  - please follow up with ENT doctor       PRINCIPAL DISCHARGE DIAGNOSIS  Diagnosis: 2019 novel coronavirus disease (COVID-19)  Assessment and Plan of Treatment: - resolved  - you do not require any further support with O2  - on chest xray no acute pulmonary disease      SECONDARY DISCHARGE DIAGNOSES  Diagnosis: Lower extremity weakness  Assessment and Plan of Treatment: - resolved  - stroke has been ruled out  - Central spinal fluid analysis did not reveal any growth / PCR did not detect any abnormalitieis  - CT of the brain did show old chronic lacunar infarcts  - your symptoms could be secondary to c4-5 moderate to severe narrowing R neural foramen. We advice you to follow up with spinal doctor.    Diagnosis: Abnormal MRI  Assessment and Plan of Treatment: - abnormal medialization of left vocal cord - could be a vocal cord paralysis  - please follow up with ENT doctor      Diagnosis: Low blood pressure  Assessment and Plan of Treatment: - please avoid blood pressure meds  - you are symptoms free  - you note that you run low blood pressues  - please follow up with Dr. Lopez as an outpatient     PRINCIPAL DISCHARGE DIAGNOSIS  Diagnosis: 2019 novel coronavirus disease (COVID-19)  Assessment and Plan of Treatment: - resolved  - you do not require any further support with O2  - on chest xray no acute pulmonary disease      SECONDARY DISCHARGE DIAGNOSES  Diagnosis: Lower extremity weakness  Assessment and Plan of Treatment: - resolved  - stroke has been ruled out  - Central spinal fluid analysis did not reveal any growth / PCR did not detect any abnormalitieis  - CT of the brain did show old chronic lacunar infarcts  - your symptoms could be secondary to c4-5 moderate to severe narrowing R neural foramen. We advice you to follow up with spinal doctor.  -Outpatient PT/OT  -f/u in office for EMG/NCV    Diagnosis: Abnormal MRI  Assessment and Plan of Treatment: - abnormal medialization of left vocal cord - could be a vocal cord paralysis  - please follow up with ENT doctor      Diagnosis: Low blood pressure  Assessment and Plan of Treatment: - please avoid blood pressure meds  - you are symptoms free  - you note that you run low blood pressues  - please follow up with Dr. Lopez as an outpatient

## 2022-12-25 NOTE — DISCHARGE NOTE PROVIDER - NSDCFUSCHEDAPPT_GEN_ALL_CORE_FT
Marshall Mata  [facility]  INT[facility] [address]  Scheduled Appointment: 01/04/2023    Liliana Lopez  [facility] [place]  INTMED [address]  Scheduled Appointment: 02/07/2023

## 2022-12-25 NOTE — DISCHARGE NOTE PROVIDER - CARE PROVIDERS DIRECT ADDRESSES
[email or web address]

## 2022-12-25 NOTE — PROGRESS NOTE ADULT - PROVIDER SPECIALTY LIST ADULT
Hospitalist
Neurology
Hospitalist
Infectious Disease
Neurology
Infectious Disease
Neurology

## 2022-12-27 ENCOUNTER — APPOINTMENT (OUTPATIENT)
Dept: INTERNAL MEDICINE | Facility: CLINIC | Age: 72
End: 2022-12-27

## 2022-12-27 ENCOUNTER — NON-APPOINTMENT (OUTPATIENT)
Age: 72
End: 2022-12-27

## 2022-12-27 VITALS
OXYGEN SATURATION: 94 % | BODY MASS INDEX: 22.2 KG/M2 | DIASTOLIC BLOOD PRESSURE: 74 MMHG | TEMPERATURE: 98.3 F | HEART RATE: 77 BPM | WEIGHT: 130 LBS | SYSTOLIC BLOOD PRESSURE: 116 MMHG | HEIGHT: 64 IN

## 2022-12-27 PROBLEM — J43.9 EMPHYSEMA, UNSPECIFIED: Chronic | Status: ACTIVE | Noted: 2022-12-17

## 2022-12-27 PROCEDURE — 99214 OFFICE O/P EST MOD 30 MIN: CPT | Mod: CS

## 2022-12-27 NOTE — HISTORY OF PRESENT ILLNESS
[FreeTextEntry1] : Pt seen   for hospital follow up. She went to the hospital because she was coughing had shortness of breath.  Was dx with COVD in the hospital  and was going to be sent home but she was   having  difficulty walking.   She felt there was weakness in the right leg and there was weakness and tightening of the right hand. She started to feel better  over the next few days.   The sx rapidly improved.  Was seen by Dr. Duran in the hospital.   Has been seeing a different neurologist   for foot drop  who  she does plan on following  up.

## 2023-01-04 ENCOUNTER — APPOINTMENT (OUTPATIENT)
Dept: INTERNAL MEDICINE | Facility: CLINIC | Age: 73
End: 2023-01-04
Payer: MEDICARE

## 2023-01-04 ENCOUNTER — NON-APPOINTMENT (OUTPATIENT)
Age: 73
End: 2023-01-04

## 2023-01-04 VITALS
OXYGEN SATURATION: 93 % | BODY MASS INDEX: 20.83 KG/M2 | HEART RATE: 86 BPM | SYSTOLIC BLOOD PRESSURE: 100 MMHG | HEIGHT: 64 IN | DIASTOLIC BLOOD PRESSURE: 60 MMHG | WEIGHT: 122 LBS | TEMPERATURE: 97.9 F

## 2023-01-04 PROCEDURE — 94060 EVALUATION OF WHEEZING: CPT

## 2023-01-04 PROCEDURE — 99214 OFFICE O/P EST MOD 30 MIN: CPT | Mod: 25

## 2023-01-04 NOTE — PROCEDURE
[FreeTextEntry1] : Spirometry pre and postbronchodilator therapy was performed.\par FEV1 is 1.55 L which is 70% predicted.  FVC is 2.17 L which is 74% predicted.  FEV1/FVC ratio 71%.  FEF 25/75% is 1.10 L/s which is 60% predicted.  PEF is 4.06 L/s which is 73% predicted.  There is no significant bronchodilator response.

## 2023-01-04 NOTE — DISCUSSION/SUMMARY
[FreeTextEntry1] : Ms. Pugh presents for a follow-up evaluation.\par She was admitted to [facility] with coronavirus infection and bilateral leg and arm weakness.\par The leg and arm weakness has resolved.\par She continues on Anoro Ellipta 1 puff daily.\par She does not require steroids.\par Follow-up in 6 months with complete pulmonary function test.

## 2023-01-04 NOTE — HISTORY OF PRESENT ILLNESS
[TextBox_4] : Ms. Pugh presents for follow-up evaluation.  She was admitted to [facility] from 12/17/2022 through 12/25/2022.  She had presented with shortness of breath and was found to be COVID-positive.  While in the hospital she complained of bilateral leg weakness with inability to walk and tingling of both hands with curling of the fingers in the right hand.  She was seen by neurology and had extensive work-up which did not show any evidence of acute infarct.\par Patient has a history of COPD and continues on an oral Ellipta.  She gets mild shortness of breath with exertion.  There are no nocturnal symptoms of cough or dyspnea.

## 2023-01-09 DIAGNOSIS — J96.01 ACUTE RESPIRATORY FAILURE WITH HYPOXIA: ICD-10-CM

## 2023-01-09 DIAGNOSIS — Z79.82 LONG TERM (CURRENT) USE OF ASPIRIN: ICD-10-CM

## 2023-01-09 DIAGNOSIS — G25.0 ESSENTIAL TREMOR: ICD-10-CM

## 2023-01-09 DIAGNOSIS — U07.1 COVID-19: ICD-10-CM

## 2023-01-09 DIAGNOSIS — Z85.820 PERSONAL HISTORY OF MALIGNANT MELANOMA OF SKIN: ICD-10-CM

## 2023-01-09 DIAGNOSIS — E87.1 HYPO-OSMOLALITY AND HYPONATREMIA: ICD-10-CM

## 2023-01-09 DIAGNOSIS — F32.A DEPRESSION, UNSPECIFIED: ICD-10-CM

## 2023-01-09 DIAGNOSIS — G62.9 POLYNEUROPATHY, UNSPECIFIED: ICD-10-CM

## 2023-01-09 DIAGNOSIS — J43.9 EMPHYSEMA, UNSPECIFIED: ICD-10-CM

## 2023-01-09 DIAGNOSIS — E87.6 HYPOKALEMIA: ICD-10-CM

## 2023-01-09 DIAGNOSIS — M48.02 SPINAL STENOSIS, CERVICAL REGION: ICD-10-CM

## 2023-01-09 DIAGNOSIS — I95.9 HYPOTENSION, UNSPECIFIED: ICD-10-CM

## 2023-01-09 DIAGNOSIS — R29.810 FACIAL WEAKNESS: ICD-10-CM

## 2023-01-09 DIAGNOSIS — Z79.51 LONG TERM (CURRENT) USE OF INHALED STEROIDS: ICD-10-CM

## 2023-01-09 DIAGNOSIS — Z90.10 ACQUIRED ABSENCE OF UNSPECIFIED BREAST AND NIPPLE: ICD-10-CM

## 2023-01-09 DIAGNOSIS — M21.372 FOOT DROP, LEFT FOOT: ICD-10-CM

## 2023-01-09 DIAGNOSIS — G81.91 HEMIPLEGIA, UNSPECIFIED AFFECTING RIGHT DOMINANT SIDE: ICD-10-CM

## 2023-01-09 DIAGNOSIS — Z87.891 PERSONAL HISTORY OF NICOTINE DEPENDENCE: ICD-10-CM

## 2023-01-09 DIAGNOSIS — G47.00 INSOMNIA, UNSPECIFIED: ICD-10-CM

## 2023-01-09 DIAGNOSIS — J38.00 PARALYSIS OF VOCAL CORDS AND LARYNX, UNSPECIFIED: ICD-10-CM

## 2023-01-13 ENCOUNTER — TRANSCRIPTION ENCOUNTER (OUTPATIENT)
Age: 73
End: 2023-01-13

## 2023-02-07 ENCOUNTER — APPOINTMENT (OUTPATIENT)
Dept: INTERNAL MEDICINE | Facility: CLINIC | Age: 73
End: 2023-02-07
Payer: MEDICARE

## 2023-02-07 ENCOUNTER — NON-APPOINTMENT (OUTPATIENT)
Age: 73
End: 2023-02-07

## 2023-02-07 VITALS
OXYGEN SATURATION: 98 % | WEIGHT: 126 LBS | DIASTOLIC BLOOD PRESSURE: 62 MMHG | TEMPERATURE: 98.4 F | HEIGHT: 64 IN | SYSTOLIC BLOOD PRESSURE: 120 MMHG | BODY MASS INDEX: 21.51 KG/M2 | HEART RATE: 72 BPM

## 2023-02-07 DIAGNOSIS — U07.1 COVID-19: ICD-10-CM

## 2023-02-07 PROCEDURE — G0444 DEPRESSION SCREEN ANNUAL: CPT

## 2023-02-07 PROCEDURE — 93000 ELECTROCARDIOGRAM COMPLETE: CPT | Mod: 59

## 2023-02-07 PROCEDURE — G0439: CPT

## 2023-02-07 PROCEDURE — 36415 COLL VENOUS BLD VENIPUNCTURE: CPT

## 2023-02-07 NOTE — HEALTH RISK ASSESSMENT
[No] : In the past 12 months have you used drugs other than those required for medical reasons? No [No falls in past year] : Patient reported no falls in the past year [0] : 2) Feeling down, depressed, or hopeless: Not at all (0) [PHQ-2 Negative - No further assessment needed] : PHQ-2 Negative - No further assessment needed [None] : None [With Family] : lives with family [] :  [Fully functional (bathing, dressing, toileting, transferring, walking, feeding)] : Fully functional (bathing, dressing, toileting, transferring, walking, feeding) [Fully functional (using the telephone, shopping, preparing meals, housekeeping, doing laundry, using] : Fully functional and needs no help or supervision to perform IADLs (using the telephone, shopping, preparing meals, housekeeping, doing laundry, using transportation, managing medications and managing finances) [Smoke Detector] : smoke detector [Carbon Monoxide Detector] : carbon monoxide detector [Seat Belt] :  uses seat belt [Sunscreen] : uses sunscreen [With Patient/Caregiver] : , with patient/caregiver [Former] : Former [20 or more] : 20 or more [> 15 Years] : > 15 Years [Patient reported mammogram was normal] : Patient reported mammogram was normal [Patient declined PAP Smear] : Patient declined PAP Smear [Patient reported bone density results were abnormal] : Patient reported bone density results were abnormal [Patient declined colonoscopy] : Patient declined colonoscopy [de-identified] : no [de-identified] : reg [[id number]] : 0 [EyeExamDate] : 1/1/22 [Change in mental status noted] : No change in mental status noted [Reports changes in hearing] : Reports no changes in hearing [MammogramDate] : 8/1/22 [BoneDensityDate] : 10/1/22 [ColonoscopyComments] : caryl [AdvancecareDate] : 2/7/23

## 2023-02-08 ENCOUNTER — NON-APPOINTMENT (OUTPATIENT)
Age: 73
End: 2023-02-08

## 2023-02-08 LAB
25(OH)D3 SERPL-MCNC: 49.2 NG/ML
ALBUMIN SERPL ELPH-MCNC: 4.5 G/DL
ALP BLD-CCNC: 74 U/L
ALT SERPL-CCNC: 21 U/L
ANION GAP SERPL CALC-SCNC: 11 MMOL/L
APPEARANCE: CLEAR
AST SERPL-CCNC: 24 U/L
BACTERIA: NEGATIVE
BASOPHILS # BLD AUTO: 0.04 K/UL
BASOPHILS NFR BLD AUTO: 0.6 %
BILIRUB DIRECT SERPL-MCNC: 0.1 MG/DL
BILIRUB SERPL-MCNC: 0.4 MG/DL
BILIRUBIN URINE: NEGATIVE
BLOOD URINE: NEGATIVE
BUN SERPL-MCNC: 8 MG/DL
CALCIUM SERPL-MCNC: 9.5 MG/DL
CHLORIDE SERPL-SCNC: 104 MMOL/L
CHOLEST SERPL-MCNC: 157 MG/DL
CO2 SERPL-SCNC: 26 MMOL/L
COLOR: NORMAL
CREAT SERPL-MCNC: 0.8 MG/DL
EGFR: 79 ML/MIN/1.73M2
EOSINOPHIL # BLD AUTO: 0.21 K/UL
EOSINOPHIL NFR BLD AUTO: 3.1 %
ESTIMATED AVERAGE GLUCOSE: 111 MG/DL
FERRITIN SERPL-MCNC: 97 NG/ML
GLUCOSE QUALITATIVE U: NEGATIVE
GLUCOSE SERPL-MCNC: 88 MG/DL
HBA1C MFR BLD HPLC: 5.5 %
HCT VFR BLD CALC: 40.4 %
HCV AB SER QL: NONREACTIVE
HCV S/CO RATIO: 0.06 S/CO
HDLC SERPL-MCNC: 43 MG/DL
HGB BLD-MCNC: 12.8 G/DL
HYALINE CASTS: 0 /LPF
IMM GRANULOCYTES NFR BLD AUTO: 0.1 %
IRON SERPL-MCNC: 106 UG/DL
KETONES URINE: NEGATIVE
LDLC SERPL CALC-MCNC: 93 MG/DL
LEUKOCYTE ESTERASE URINE: NEGATIVE
LYMPHOCYTES # BLD AUTO: 2.04 K/UL
LYMPHOCYTES NFR BLD AUTO: 30.4 %
MAN DIFF?: NORMAL
MCHC RBC-ENTMCNC: 30.3 PG
MCHC RBC-ENTMCNC: 31.7 GM/DL
MCV RBC AUTO: 95.7 FL
MICROSCOPIC-UA: NORMAL
MONOCYTES # BLD AUTO: 0.67 K/UL
MONOCYTES NFR BLD AUTO: 10 %
NEUTROPHILS # BLD AUTO: 3.73 K/UL
NEUTROPHILS NFR BLD AUTO: 55.8 %
NITRITE URINE: NEGATIVE
NONHDLC SERPL-MCNC: 114 MG/DL
PH URINE: 6.5
PLATELET # BLD AUTO: 269 K/UL
POTASSIUM SERPL-SCNC: 4.9 MMOL/L
PROT SERPL-MCNC: 6.6 G/DL
PROTEIN URINE: NEGATIVE
RBC # BLD: 4.22 M/UL
RBC # FLD: 15.3 %
RED BLOOD CELLS URINE: 2 /HPF
SODIUM SERPL-SCNC: 141 MMOL/L
SPECIFIC GRAVITY URINE: 1.01
SQUAMOUS EPITHELIAL CELLS: 0 /HPF
T4 SERPL-MCNC: 5.7 UG/DL
TRIGL SERPL-MCNC: 103 MG/DL
TSH SERPL-ACNC: 5.76 UIU/ML
UROBILINOGEN URINE: NORMAL
WBC # FLD AUTO: 6.7 K/UL
WHITE BLOOD CELLS URINE: 0 /HPF

## 2023-02-20 ENCOUNTER — RX RENEWAL (OUTPATIENT)
Age: 73
End: 2023-02-20

## 2023-03-23 ENCOUNTER — TRANSCRIPTION ENCOUNTER (OUTPATIENT)
Age: 73
End: 2023-03-23

## 2023-04-02 ENCOUNTER — RX RENEWAL (OUTPATIENT)
Age: 73
End: 2023-04-02

## 2023-04-24 ENCOUNTER — TRANSCRIPTION ENCOUNTER (OUTPATIENT)
Age: 73
End: 2023-04-24

## 2023-04-24 ENCOUNTER — RX RENEWAL (OUTPATIENT)
Age: 73
End: 2023-04-24

## 2023-04-26 ENCOUNTER — TRANSCRIPTION ENCOUNTER (OUTPATIENT)
Age: 73
End: 2023-04-26

## 2023-04-27 RX ORDER — ZOLPIDEM TARTRATE 10 MG/1
10 TABLET ORAL
Qty: 30 | Refills: 0 | Status: DISCONTINUED | COMMUNITY
Start: 2023-04-24 | End: 2023-04-27

## 2023-05-05 ENCOUNTER — TRANSCRIPTION ENCOUNTER (OUTPATIENT)
Age: 73
End: 2023-05-05

## 2023-05-08 ENCOUNTER — NON-APPOINTMENT (OUTPATIENT)
Age: 73
End: 2023-05-08

## 2023-05-15 ENCOUNTER — NON-APPOINTMENT (OUTPATIENT)
Age: 73
End: 2023-05-15

## 2023-05-16 ENCOUNTER — APPOINTMENT (OUTPATIENT)
Dept: NEUROSURGERY | Facility: CLINIC | Age: 73
End: 2023-05-16
Payer: MEDICARE

## 2023-05-16 VITALS
SYSTOLIC BLOOD PRESSURE: 114 MMHG | OXYGEN SATURATION: 95 % | HEART RATE: 80 BPM | DIASTOLIC BLOOD PRESSURE: 69 MMHG | WEIGHT: 126 LBS | HEIGHT: 64 IN | BODY MASS INDEX: 21.51 KG/M2

## 2023-05-16 PROCEDURE — 99205 OFFICE O/P NEW HI 60 MIN: CPT

## 2023-05-19 NOTE — ASSESSMENT
[FreeTextEntry1] : IMPRESSION:\par 73F with PMH of severe ET, osteoporosis, chronic back pain, neuropathy, sciatica, thyroid nodules, arthritis. She presents for HIFU consult.  Diagnosed with ET 4 years ago, takes primidone 100 mg TID. Left hand tremor has worsened over the years. Doesn't feel robust effect of medication. Denies side effects. Tremor is affecting QOL and ability to do ADLs, cooking, eating, drinking, and getting in the way of doing hobbies like sewing/knitting. \par \par I have discussed this patient’s symptoms with them. The symptoms significantly interfere with her quality of life. I think that this patient could benefit from HIFU or right VIM thalamus-DRTt DBS depending on the degree of tremor relief she is seeking.  She has had inadequate control of her tremor for >6 months with the use of medication.\par \par Discussed MRI focused ultrasound in office today as well as deep brain stimulation:\par With MR guided focused ultrasound, a lesion is made in brain that is pathologically related to cause of tremor. If pathological track is targeted by creating lesion/burn in area along white matter tract, it will also treat tremor. The ventral intermediate nucleus of the thalamus is area that is targeted during procedure.\par \par High intensity focused ultrasound is a procedure that  occurs in MRI magnet, where a series of MRIs are performed and the target is identified and ultrasound therapy is targeted at identified area. Head would be completely shaved. Lesions are irreversible. FDA approved for unilateral treatment.  The other side can be treated 9-12 months later if needed. This is procedure, not a surgery, not performed under anesthesia. HIFU would only improve tremor on one side, and would not improve voice tremor, or head tremor, as bilateral treatment needed. CT head (HIFU protocol) would be performed to determine skull density ratio and true candidacy.\par \par Side effects are possible including tingling around face on one side or around lips or hand. That may persist up to several months after procedure. May be faint to severe. May attenuate over a year following procedure. Weakness possible in addition to paraesthesias. Side effect may armaan over time. Very common to have unsteady gait after procedure. May be discharged with a walker as a fall precaution. Efficacy of tremor suppression wanes after 1 year and attenuates over time. May need another procedure in the future. \par \par DBS is surgery and is adaptable over time. The patient is awake for part of surgery but sedated for most of it. Head would be completely shaved. Electrodes are placed through anika holes, side effects and benefit are assessed intraoperatively. Electrode placement adjusted in OR for optimal placement. DBS is adaptable and programming settings can be adjusted over time with disease progression. This is reversible. DBS has high tremor efficacy, tremor should be greatly reduced. Head tremor should also improve with b/l stimulation, although voice tremor does not always improve. Pt has to be surgery candidate and able to tolerate. Has to be off blood thinners if cleared by cardiologist. Risk of bleeding, infection, seizure, stroke with surgery. DBS is performed in 2 stages ~2 weeks apart. Stage 1 is implantation of DBS electrodes in the brain. Pt stays 1 night in the hospital and is likely discharged home the following day. Stage 2 is an outpatient surgery for placement of lead extension and IPG. This is done at the [facility]. Pt is discharged the same day with pain medication and ~1 week of antibiotics. A Sumbola non rechargeable battery would be placed that can last 3-5 years but depends on stimulation settings.\par \par Patient to consider both options.\par \par  \par \par PLAN: \par Pt to contact office when she decides which treatment option she chooses- referrals/imaging to be ordered depending on treatment.\par Referral to movement disorder neurologist.\par \par \par I, Dr. Eleuterio Holm, personally performed the evaluation and management (E/M) services for this new patient.  That E/M includes conducting the clinically appropriate initial history &/or exam, assessing all conditions, and establishing the plan of care.  Today, my CHULA, Ashley EdgardoGeneral Specific, was here to observe my evaluation and management service for this patient & follow plan of care established by me going forward.\par

## 2023-05-19 NOTE — HISTORY OF PRESENT ILLNESS
[> 3 months] : more  than 3 months [FreeTextEntry1] : tremor [de-identified] : CHARLENE CASTILLO is a 73 year old left handed female with PMH of severe essential tremor, osteoporosis, chronic back pain, carpal tunnel syndrome, sciatica, thyroid nodules, arthritis. She takes baby aspirin daily for prevention. No other anticoagulants/antiplatelets. She presents for neurosurgical consultation for high intensity focused ultrasound. She is under the care of neurologist Dr. Reddy. She was diagnosed with essential tremor ~4 years ago.  She isn't sure exactly when it started. It started and remains only in the left hand. No head, jaw, voice, right hand, or leg tremor. She is taking primidone 100 mg TID. She doesn't have side effects of medication. She is not sure if the effect is as good anymore from medication. She is on gabapentin for neuropathy as well. Since December her tremor has worsened. It is worse with stress and anxiety, and when she is doing activity/tasks.She likes to do crafts and sewing, and it is getting in the way of these activities. Cooking is difficult and she needs help. Has trouble holding fork with left hand, she sometimes uses right hand to compensate. Pouring coffee or walking with mug makes tremor worse. Family history of tremor in her mother. She retired unrelated to tremor. Balance is a little off which she states is related to left foot neuropathy, she has had a few falls in the past year.

## 2023-05-19 NOTE — PHYSICAL EXAM
[General Appearance - Alert] : alert [General Appearance - In No Acute Distress] : in no acute distress [General Appearance - Well Nourished] : well nourished [General Appearance - Well-Appearing] : healthy appearing [Oriented To Time, Place, And Person] : oriented to person, place, and time [Affect] : the affect was normal [Mood] : the mood was normal [Person] : oriented to person [Place] : oriented to place [Cranial Nerves Oculomotor (III)] : extraocular motion intact [Cranial Nerves Trigeminal (V)] : facial sensation intact symmetrically [Cranial Nerves Facial (VII)] : face symmetrical [Cranial Nerves Vestibulocochlear (VIII)] : hearing was intact bilaterally [Cranial Nerves Accessory (XI - Cranial And Spinal)] : head turning and shoulder shrug symmetric [Cranial Nerves Hypoglossal (XII)] : there was no tongue deviation with protrusion [Motor Strength] : muscle strength was normal in all four extremities [Motor Handedness Left-Handed] : the patient is left hand dominant [Tremor] : a tremor present [] : no respiratory distress [Respiration, Rhythm And Depth] : normal respiratory rhythm and effort [Sensation Tactile Decrease] : light touch was intact [Abnormal Walk] : normal gait [FreeTextEntry1] : slight voice and jaw tremor [FreeTextEntry8] : tremor grading: no resting tremor, postural with arms extended: 1-3 cm left, winged: 1-3 cm left, finger to nose: 3-5 cm  left, tremor pronounced when holding mug and bringing to mouth, a lot of tremor with spiral. Difficulty to assess balance d/t left foot neuropathy

## 2023-05-21 ENCOUNTER — RX RENEWAL (OUTPATIENT)
Age: 73
End: 2023-05-21

## 2023-05-25 ENCOUNTER — OUTPATIENT (OUTPATIENT)
Dept: OUTPATIENT SERVICES | Facility: HOSPITAL | Age: 73
LOS: 1 days | End: 2023-05-25
Payer: MEDICARE

## 2023-05-25 DIAGNOSIS — G25.0 ESSENTIAL TREMOR: ICD-10-CM

## 2023-05-25 PROCEDURE — 70450 CT HEAD/BRAIN W/O DYE: CPT

## 2023-05-25 PROCEDURE — 70450 CT HEAD/BRAIN W/O DYE: CPT | Mod: 26,MH

## 2023-06-01 ENCOUNTER — NON-APPOINTMENT (OUTPATIENT)
Age: 73
End: 2023-06-01

## 2023-06-12 ENCOUNTER — NON-APPOINTMENT (OUTPATIENT)
Age: 73
End: 2023-06-12

## 2023-06-12 ENCOUNTER — TRANSCRIPTION ENCOUNTER (OUTPATIENT)
Age: 73
End: 2023-06-12

## 2023-06-16 ENCOUNTER — APPOINTMENT (OUTPATIENT)
Dept: COLORECTAL SURGERY | Facility: CLINIC | Age: 73
End: 2023-06-16
Payer: MEDICARE

## 2023-06-16 VITALS
HEART RATE: 82 BPM | DIASTOLIC BLOOD PRESSURE: 67 MMHG | HEIGHT: 64 IN | BODY MASS INDEX: 21.51 KG/M2 | SYSTOLIC BLOOD PRESSURE: 101 MMHG | WEIGHT: 126 LBS

## 2023-06-16 PROCEDURE — 99203 OFFICE O/P NEW LOW 30 MIN: CPT

## 2023-06-16 NOTE — HISTORY OF PRESENT ILLNESS
[FreeTextEntry1] : 73 year old female who presents for discussion of colonoscopy after having a positive Cologuard test. No significant GI symptoms reported, denies change in bowel habits, denies anorectal bleeding, denies unintentional weight loss. She reports having 2 prior colonoscopies both of which the prep was inadequate.

## 2023-06-16 NOTE — ASSESSMENT
[FreeTextEntry1] : 73 year old female who presents for discussion and scheduling of screening colonoscopy after positive cologuard screening test. We discussed the following:\par Colonoscopy risks including bowel perforation requiring emergent operative intervention, bloating, discomfort, abdominal pain, cardiac events and or death; benefits including early detection of polyps and or colon and rectal cancer were discussed.Instructions regarding prep was discussed with the patient and hard copy was provided. Patient will be scheduled at her earliest connivence.\par

## 2023-06-16 NOTE — CONSULT LETTER
[Dear  ___] : Dear  [unfilled], [Consult Letter:] : I had the pleasure of evaluating your patient, [unfilled]. [Please see my note below.] : Please see my note below. [Consult Closing:] : Thank you very much for allowing me to participate in the care of this patient.  If you have any questions, please do not hesitate to contact me. [Sincerely,] : Sincerely, [FreeTextEntry3] : Nani Hendricks MD\par Colon and Rectal Surgery

## 2023-06-16 NOTE — PHYSICAL EXAM
[Exam Deferred] : exam was deferred [Alert] : alert [Oriented to Person] : oriented to person [Oriented to Place] : oriented to place [Oriented to Time] : oriented to time [Calm] : calm [de-identified] : NAD [de-identified] : Nonlabored [de-identified] : Normal rate

## 2023-06-21 ENCOUNTER — TRANSCRIPTION ENCOUNTER (OUTPATIENT)
Age: 73
End: 2023-06-21

## 2023-06-22 ENCOUNTER — APPOINTMENT (OUTPATIENT)
Dept: INTERNAL MEDICINE | Facility: CLINIC | Age: 73
End: 2023-06-22

## 2023-06-22 ENCOUNTER — NON-APPOINTMENT (OUTPATIENT)
Age: 73
End: 2023-06-22

## 2023-06-29 ENCOUNTER — APPOINTMENT (OUTPATIENT)
Dept: INTERNAL MEDICINE | Facility: CLINIC | Age: 73
End: 2023-06-29
Payer: MEDICARE

## 2023-06-29 ENCOUNTER — NON-APPOINTMENT (OUTPATIENT)
Age: 73
End: 2023-06-29

## 2023-06-29 VITALS
OXYGEN SATURATION: 96 % | SYSTOLIC BLOOD PRESSURE: 116 MMHG | WEIGHT: 128 LBS | TEMPERATURE: 98.1 F | HEART RATE: 75 BPM | BODY MASS INDEX: 21.85 KG/M2 | DIASTOLIC BLOOD PRESSURE: 64 MMHG | HEIGHT: 64 IN

## 2023-06-29 PROCEDURE — 36415 COLL VENOUS BLD VENIPUNCTURE: CPT

## 2023-06-29 PROCEDURE — 93000 ELECTROCARDIOGRAM COMPLETE: CPT

## 2023-06-29 PROCEDURE — 99214 OFFICE O/P EST MOD 30 MIN: CPT | Mod: 25

## 2023-06-29 NOTE — HISTORY OF PRESENT ILLNESS
[No Pertinent Cardiac History] : no history of aortic stenosis, atrial fibrillation, coronary artery disease, recent myocardial infarction, or implantable device/pacemaker [COPD] : COPD [No Adverse Anesthesia Reaction] : no adverse anesthesia reaction in self or family member [(Patient denies any chest pain, claudication, dyspnea on exertion, orthopnea, palpitations or syncope)] : Patient denies any chest pain, claudication, dyspnea on exertion, orthopnea, palpitations or syncope [Good (7-10 METs)] : Good (7-10 METs) [FreeTextEntry1] : colonoscopy [FreeTextEntry2] : 7/6/23 [FreeTextEntry3] : Dr. Jane Floyd [FreeTextEntry4] : Pt  to have  colonoscopy after  a posiive cologuard Feels  well.  otherwise. [FreeTextEntry6] : no exertional sx had  stress test less than 5 years ago. [FreeTextEntry7] : had stress test less than 5 years

## 2023-06-30 LAB
ALBUMIN SERPL ELPH-MCNC: 4.2 G/DL
ALP BLD-CCNC: 82 U/L
ALT SERPL-CCNC: 19 U/L
ANION GAP SERPL CALC-SCNC: 10 MMOL/L
AST SERPL-CCNC: 24 U/L
BILIRUB SERPL-MCNC: 0.3 MG/DL
BUN SERPL-MCNC: 12 MG/DL
CALCIUM SERPL-MCNC: 9.7 MG/DL
CHLORIDE SERPL-SCNC: 101 MMOL/L
CO2 SERPL-SCNC: 26 MMOL/L
CREAT SERPL-MCNC: 0.8 MG/DL
EGFR: 78 ML/MIN/1.73M2
GLUCOSE SERPL-MCNC: 81 MG/DL
POTASSIUM SERPL-SCNC: 4.9 MMOL/L
PROT SERPL-MCNC: 6.3 G/DL
SODIUM SERPL-SCNC: 138 MMOL/L
T4 SERPL-MCNC: 5.8 UG/DL
TSH SERPL-ACNC: 3.17 UIU/ML

## 2023-07-03 ENCOUNTER — NON-APPOINTMENT (OUTPATIENT)
Age: 73
End: 2023-07-03

## 2023-07-06 ENCOUNTER — RESULT REVIEW (OUTPATIENT)
Age: 73
End: 2023-07-06

## 2023-07-06 ENCOUNTER — APPOINTMENT (OUTPATIENT)
Dept: COLORECTAL SURGERY | Facility: AMBULATORY MEDICAL SERVICES | Age: 73
End: 2023-07-06
Payer: MEDICARE

## 2023-07-06 PROCEDURE — 45380 COLONOSCOPY AND BIOPSY: CPT

## 2023-07-18 ENCOUNTER — APPOINTMENT (OUTPATIENT)
Dept: NEUROLOGY | Facility: CLINIC | Age: 73
End: 2023-07-18
Payer: MEDICARE

## 2023-07-18 VITALS
BODY MASS INDEX: 21.34 KG/M2 | HEIGHT: 64 IN | SYSTOLIC BLOOD PRESSURE: 103 MMHG | WEIGHT: 125 LBS | HEART RATE: 73 BPM | DIASTOLIC BLOOD PRESSURE: 69 MMHG

## 2023-07-18 PROCEDURE — 99215 OFFICE O/P EST HI 40 MIN: CPT

## 2023-07-20 ENCOUNTER — APPOINTMENT (OUTPATIENT)
Dept: INTERNAL MEDICINE | Facility: CLINIC | Age: 73
End: 2023-07-20

## 2023-08-02 NOTE — HISTORY OF PRESENT ILLNESS
[FreeTextEntry1] : 73-year-old female with left hand tremor for the last 4 years but became more noticeable in the last 2 years. Patient finds that the tremor becomes more pronounced with anxiety. She reports difficulty with the following ADLs: sewing, cooking, pouring and crafting. She is left hand dominant.  She does not notice a difference in the tremor after consuming alcohol.  She was referred to Dr Holm by Dr Reddy, her neurologist from [facility], for an advanced therapy. Patient has left leg neuropathy which has been effecting her gait for about 20 years.   Family history: tremors in her 90s   Social history: Lives with , retired .     PMHx: RLS, COPD   900 mg Gabapenting qhs 100 mg primidone TID 25 mg 1 tab qhs ropinerole

## 2023-08-02 NOTE — PHYSICAL EXAM
[FreeTextEntry1] : No masked facies Vertical eye movements intact without square wave jerks Normal speech, no VT  0 Rigidity of neck rotation 0 Rigidity of limbs present with contralateral activation  trace left resting tremor Moderate LUE action tremor mild left postural tremor  0 Bradykinesia with finger tapping, hand supination/pronation, foot tapping, foot stomping. No dysmetria with finger to nose Gait: able to stand with hands crossed and without assistance normal posture Normal gait. unable to tandem walk. Able to walk on heels and toes Negative pull test  Moderate to severe waveforms on spiral drawn with left hand. Legible handwriting with obvious tremor.   ELIEZER 1,2 & 3 score: 3 Tetras ADL subscale: 26/48 Tetras performance subscale: 23/64

## 2023-08-02 NOTE — DISCUSSION/SUMMARY
[FreeTextEntry1] : 73 year old female with 4 yr history of unilateral ET effecting her dominant, left side. Tremor assesment completed. She notify me when she decided if she would like to proceed with HiFu or DBS work up. Patient will continue to follow up with her neurologist Dr Reddy for medical management.

## 2023-08-07 ENCOUNTER — APPOINTMENT (OUTPATIENT)
Dept: INTERNAL MEDICINE | Facility: CLINIC | Age: 73
End: 2023-08-07
Payer: MEDICARE

## 2023-08-07 VITALS
OXYGEN SATURATION: 98 % | TEMPERATURE: 98.3 F | HEART RATE: 72 BPM | WEIGHT: 125 LBS | SYSTOLIC BLOOD PRESSURE: 120 MMHG | DIASTOLIC BLOOD PRESSURE: 68 MMHG | BODY MASS INDEX: 21.34 KG/M2 | HEIGHT: 64 IN

## 2023-08-07 PROCEDURE — 99214 OFFICE O/P EST MOD 30 MIN: CPT

## 2023-08-07 NOTE — HISTORY OF PRESENT ILLNESS
[FreeTextEntry1] : Pt is here for follow up of multiple medical problems including tremor, COPD, hyperlipidemia MGUs

## 2023-08-14 ENCOUNTER — TRANSCRIPTION ENCOUNTER (OUTPATIENT)
Age: 73
End: 2023-08-14

## 2023-08-16 ENCOUNTER — TRANSCRIPTION ENCOUNTER (OUTPATIENT)
Age: 73
End: 2023-08-16

## 2023-08-17 ENCOUNTER — TRANSCRIPTION ENCOUNTER (OUTPATIENT)
Age: 73
End: 2023-08-17

## 2023-08-27 ENCOUNTER — RX RENEWAL (OUTPATIENT)
Age: 73
End: 2023-08-27

## 2023-09-17 ENCOUNTER — TRANSCRIPTION ENCOUNTER (OUTPATIENT)
Age: 73
End: 2023-09-17

## 2023-10-19 ENCOUNTER — TRANSCRIPTION ENCOUNTER (OUTPATIENT)
Age: 73
End: 2023-10-19

## 2023-10-20 ENCOUNTER — NON-APPOINTMENT (OUTPATIENT)
Age: 73
End: 2023-10-20

## 2023-10-20 ENCOUNTER — APPOINTMENT (OUTPATIENT)
Dept: INTERNAL MEDICINE | Facility: CLINIC | Age: 73
End: 2023-10-20
Payer: MEDICARE

## 2023-10-20 VITALS
SYSTOLIC BLOOD PRESSURE: 80 MMHG | HEIGHT: 64 IN | RESPIRATION RATE: 16 BRPM | OXYGEN SATURATION: 98 % | DIASTOLIC BLOOD PRESSURE: 50 MMHG | WEIGHT: 125 LBS | BODY MASS INDEX: 21.34 KG/M2 | TEMPERATURE: 98.4 F | HEART RATE: 80 BPM

## 2023-10-20 DIAGNOSIS — Z87.891 PERSONAL HISTORY OF NICOTINE DEPENDENCE: ICD-10-CM

## 2023-10-20 PROCEDURE — 94010 BREATHING CAPACITY TEST: CPT

## 2023-10-20 PROCEDURE — 99213 OFFICE O/P EST LOW 20 MIN: CPT | Mod: 25

## 2023-10-20 RX ORDER — UMECLIDINIUM BROMIDE AND VILANTEROL TRIFENATATE 62.5; 25 UG/1; UG/1
62.5-25 POWDER RESPIRATORY (INHALATION)
Qty: 3 | Refills: 3 | Status: ACTIVE | COMMUNITY
Start: 2021-12-09 | End: 1900-01-01

## 2023-11-21 ENCOUNTER — NON-APPOINTMENT (OUTPATIENT)
Age: 73
End: 2023-11-21

## 2023-11-22 ENCOUNTER — TRANSCRIPTION ENCOUNTER (OUTPATIENT)
Age: 73
End: 2023-11-22

## 2023-11-27 ENCOUNTER — OUTPATIENT (OUTPATIENT)
Dept: OUTPATIENT SERVICES | Facility: HOSPITAL | Age: 73
LOS: 1 days | End: 2023-11-27
Payer: MEDICARE

## 2023-11-27 ENCOUNTER — APPOINTMENT (OUTPATIENT)
Dept: MRI IMAGING | Facility: HOSPITAL | Age: 73
End: 2023-11-27

## 2023-11-27 DIAGNOSIS — G25.0 ESSENTIAL TREMOR: ICD-10-CM

## 2023-11-27 DIAGNOSIS — Z00.00 ENCOUNTER FOR GENERAL ADULT MEDICAL EXAMINATION WITHOUT ABNORMAL FINDINGS: ICD-10-CM

## 2023-11-27 PROCEDURE — 70551 MRI BRAIN STEM W/O DYE: CPT

## 2023-11-27 PROCEDURE — 70551 MRI BRAIN STEM W/O DYE: CPT | Mod: 26,MH

## 2023-12-13 ENCOUNTER — TRANSCRIPTION ENCOUNTER (OUTPATIENT)
Age: 73
End: 2023-12-13

## 2023-12-14 ENCOUNTER — APPOINTMENT (OUTPATIENT)
Dept: MRI IMAGING | Facility: HOSPITAL | Age: 73
End: 2023-12-14

## 2023-12-14 ENCOUNTER — NON-APPOINTMENT (OUTPATIENT)
Age: 73
End: 2023-12-14

## 2023-12-14 ENCOUNTER — OUTPATIENT (OUTPATIENT)
Dept: OUTPATIENT SERVICES | Facility: HOSPITAL | Age: 73
LOS: 1 days | End: 2023-12-14
Payer: MEDICARE

## 2023-12-14 ENCOUNTER — TRANSCRIPTION ENCOUNTER (OUTPATIENT)
Age: 73
End: 2023-12-14

## 2023-12-14 ENCOUNTER — APPOINTMENT (OUTPATIENT)
Dept: NEUROSURGERY | Facility: HOSPITAL | Age: 73
End: 2023-12-14

## 2023-12-14 VITALS
OXYGEN SATURATION: 100 % | HEART RATE: 65 BPM | RESPIRATION RATE: 14 BRPM | SYSTOLIC BLOOD PRESSURE: 120 MMHG | WEIGHT: 121.03 LBS | DIASTOLIC BLOOD PRESSURE: 63 MMHG | HEIGHT: 64 IN

## 2023-12-14 VITALS — DIASTOLIC BLOOD PRESSURE: 85 MMHG | SYSTOLIC BLOOD PRESSURE: 121 MMHG | HEART RATE: 73 BPM | RESPIRATION RATE: 14 BRPM

## 2023-12-14 DIAGNOSIS — C43.9 MALIGNANT MELANOMA OF SKIN, UNSPECIFIED: Chronic | ICD-10-CM

## 2023-12-14 DIAGNOSIS — Z98.49 CATARACT EXTRACTION STATUS, UNSPECIFIED EYE: Chronic | ICD-10-CM

## 2023-12-14 DIAGNOSIS — Z90.11 ACQUIRED ABSENCE OF RIGHT BREAST AND NIPPLE: Chronic | ICD-10-CM

## 2023-12-14 DIAGNOSIS — Z98.890 OTHER SPECIFIED POSTPROCEDURAL STATES: Chronic | ICD-10-CM

## 2023-12-14 DIAGNOSIS — Z90.710 ACQUIRED ABSENCE OF BOTH CERVIX AND UTERUS: Chronic | ICD-10-CM

## 2023-12-14 DIAGNOSIS — Z00.00 ENCOUNTER FOR GENERAL ADULT MEDICAL EXAMINATION WITHOUT ABNORMAL FINDINGS: ICD-10-CM

## 2023-12-14 PROCEDURE — 61715 MRGFUS STRTCTC ABLT TRGT ICR: CPT

## 2023-12-14 PROCEDURE — 0398T: CPT | Mod: 26

## 2023-12-14 RX ORDER — ACETAMINOPHEN 500 MG
1000 TABLET ORAL ONCE
Refills: 0 | Status: COMPLETED | OUTPATIENT
Start: 2023-12-14 | End: 2023-12-14

## 2023-12-14 RX ORDER — DULOXETINE HYDROCHLORIDE 30 MG/1
1 CAPSULE, DELAYED RELEASE ORAL
Qty: 0 | Refills: 0 | DISCHARGE

## 2023-12-14 RX ORDER — DEXAMETHASONE 0.5 MG/5ML
10 ELIXIR ORAL ONCE
Refills: 0 | Status: COMPLETED | OUTPATIENT
Start: 2023-12-14 | End: 2023-12-14

## 2023-12-14 RX ORDER — CHOLECALCIFEROL (VITAMIN D3) 125 MCG
1 CAPSULE ORAL
Qty: 0 | Refills: 0 | DISCHARGE

## 2023-12-14 RX ORDER — TRAMADOL HYDROCHLORIDE 50 MG/1
1 TABLET ORAL
Qty: 0 | Refills: 0 | DISCHARGE

## 2023-12-14 RX ORDER — PRIMIDONE 250 MG/1
1 TABLET ORAL
Qty: 0 | Refills: 0 | DISCHARGE

## 2023-12-14 RX ORDER — UMECLIDINIUM BROMIDE AND VILANTEROL TRIFENATATE 62.5; 25 UG/1; UG/1
1 POWDER RESPIRATORY (INHALATION)
Qty: 0 | Refills: 0 | DISCHARGE

## 2023-12-14 RX ORDER — ATORVASTATIN CALCIUM 80 MG/1
1 TABLET, FILM COATED ORAL
Qty: 0 | Refills: 0 | DISCHARGE

## 2023-12-14 RX ORDER — GABAPENTIN 400 MG/1
1 CAPSULE ORAL
Qty: 0 | Refills: 0 | DISCHARGE

## 2023-12-14 RX ORDER — ROPINIROLE 8 MG/1
1 TABLET, FILM COATED, EXTENDED RELEASE ORAL
Qty: 0 | Refills: 0 | DISCHARGE

## 2023-12-14 RX ORDER — ZOLPIDEM TARTRATE 10 MG/1
1 TABLET ORAL
Qty: 0 | Refills: 0 | DISCHARGE

## 2023-12-14 RX ORDER — ASPIRIN/CALCIUM CARB/MAGNESIUM 324 MG
1 TABLET ORAL
Qty: 0 | Refills: 0 | DISCHARGE

## 2023-12-14 RX ORDER — ONDANSETRON 8 MG/1
4 TABLET, FILM COATED ORAL ONCE
Refills: 0 | Status: COMPLETED | OUTPATIENT
Start: 2023-12-14 | End: 2023-12-14

## 2023-12-14 RX ORDER — ALBUTEROL 90 UG/1
2 AEROSOL, METERED ORAL
Qty: 0 | Refills: 0 | DISCHARGE

## 2023-12-14 RX ADMIN — ONDANSETRON 4 MILLIGRAM(S): 8 TABLET, FILM COATED ORAL at 08:26

## 2023-12-14 RX ADMIN — Medication 400 MILLIGRAM(S): at 08:26

## 2023-12-14 RX ADMIN — Medication 10 MILLIGRAM(S): at 08:26

## 2023-12-14 NOTE — ASU PATIENT PROFILE, ADULT - FALL HARM RISK - UNIVERSAL INTERVENTIONS
Bed in lowest position, wheels locked, appropriate side rails in place/Call bell, personal items and telephone in reach/Instruct patient to call for assistance before getting out of bed or chair/Non-slip footwear when patient is out of bed/[place] to call system/Physically safe environment - no spills, clutter or unnecessary equipment/Purposeful Proactive Rounding/Room/bathroom lighting operational, light cord in reach Bed in lowest position, wheels locked, appropriate side rails in place/Call bell, personal items and telephone in reach/Instruct patient to call for assistance before getting out of bed or chair/Non-slip footwear when patient is out of bed/[place] to call system/Physically safe environment - no spills, clutter or unnecessary equipment/Purposeful Proactive Rounding/Room/bathroom lighting operational, light cord in reach

## 2023-12-14 NOTE — H&P ADULT - HISTORY OF PRESENT ILLNESS
73F with PMH of severe ET, osteoporosis, chronic back pain, neuropathy, sciatica, thyroid nodules, arthritis. She presents for HIFU. Diagnosed with ET 4 years ago, takes primidone 100 mg TID. Left hand tremor has worsened over the years. Doesn't feel robust effect of medication. Denies side effects. Tremor is affecting QOL and ability to do ADLs, cooking, eating, drinking, and getting in the way of doing hobbies like sewing/knitting.

## 2023-12-14 NOTE — ASU DISCHARGE PLAN (ADULT/PEDIATRIC) - NS MD DC FALL RISK RISK
For information on Fall & Injury Prevention, visit: [email or web address] OR  [email or web address] OR  https://www.cdc.gov/steadi/patient.html For information on Fall & Injury Prevention, visit: [email or web address] OR  [email or web address] OR  https://www.cdc.gov/steadi/patient.html

## 2023-12-14 NOTE — ASU DISCHARGE PLAN (ADULT/PEDIATRIC) - CARE PROVIDER_API CALL
Eleuterio Holm  Neurosurgery  [address]  [place] [address]  Phone: [phone]  Fax: [phone]  Follow Up Time:    Eleuterio Holm  Neurosurgery  [address]  [place] [address]  Phone: [phone]  Fax: [phone]  Follow Up Time:

## 2023-12-14 NOTE — BRIEF OPERATIVE NOTE - NSICDXBRIEFPROCEDURE_GEN_ALL_CORE_FT
PROCEDURES:  High intensity focused ultrasound ablation of thalamus with magnetic resonance imaging guidance 14-Dec-2023 09:51:24  Alysia Bose

## 2023-12-14 NOTE — ASU PATIENT PROFILE, ADULT - NSICDXPASTSURGICALHX_GEN_ALL_CORE_FT
PAST SURGICAL HISTORY:  History of ear surgery     History of right mastectomy     Melanoma     S/P breast reconstruction     S/P cataract surgery     S/P CATALINO-BSO

## 2023-12-14 NOTE — H&P ADULT - NSHPPHYSICALEXAM_GEN_ALL_CORE
AOx3, FC, PERRL, EOMI, no facial   5/5 throughout, no drift  SILT  no clonus  a tremor present. tremor grading: no resting tremor, postural with arms extended: 1-3 cm left, winged: 1-3 cm left, finger to nose: 3-5 cm left, tremor pronounced when holding mug and bringing to mouth, a lot of tremor with spiral. Difficulty to assess balance d/t left foot neuropathy.

## 2023-12-14 NOTE — H&P ADULT - ASSESSMENT
73F with PMH of severe ET, osteoporosis, chronic back pain, neuropathy, sciatica, thyroid nodules, arthritis. She presents for HIFU. Diagnosed with ET 4 years ago, takes primidone 100 mg TID. Left hand tremor has worsened over the years. Doesn't feel robust effect of medication. Denies side effects. Tremor is affecting QOL and ability to do ADLs, cooking, eating, drinking, and getting in the way of doing hobbies like sewing/knitting.  - HIFU for R VIM for Left tremor

## 2023-12-18 ENCOUNTER — TRANSCRIPTION ENCOUNTER (OUTPATIENT)
Age: 73
End: 2023-12-18

## 2023-12-20 ENCOUNTER — NON-APPOINTMENT (OUTPATIENT)
Age: 73
End: 2023-12-20

## 2023-12-21 ENCOUNTER — TRANSCRIPTION ENCOUNTER (OUTPATIENT)
Age: 73
End: 2023-12-21

## 2024-01-09 ENCOUNTER — APPOINTMENT (OUTPATIENT)
Dept: NEUROSURGERY | Facility: CLINIC | Age: 74
End: 2024-01-09
Payer: MEDICARE

## 2024-01-09 VITALS
BODY MASS INDEX: 21.34 KG/M2 | OXYGEN SATURATION: 94 % | SYSTOLIC BLOOD PRESSURE: 123 MMHG | DIASTOLIC BLOOD PRESSURE: 71 MMHG | WEIGHT: 125 LBS | HEART RATE: 73 BPM | HEIGHT: 64 IN

## 2024-01-09 PROCEDURE — 99213 OFFICE O/P EST LOW 20 MIN: CPT

## 2024-01-09 NOTE — ASSESSMENT
[FreeTextEntry1] : Martha Pugh is a 73 year old woman S/P  HiFU for treatment of left hand tremor and is doing excellent.  Imaging and diagnostic workup evaluation show evidence of ablation lesion post HiFu   Plan: Follow up in 3 months with New MRI Continue follow-up with Neurology for post-treatment tremor grading.  .IEleuterio evaluated the patient with the nurse practitioner Michael Pérez and established the plan of care. I personally discuss this patient with the nurse practitioner at the time of the visit. I agree with the assessment and plan as written, unless noted below.

## 2024-01-09 NOTE — REASON FOR VISIT
[Follow-Up: _____] : a [unfilled] follow-up visit [FreeTextEntry1] : This is her one-month follow-up visit. She reports that she is doing well with the expectation of experiencing a 'rubbery" sensation in her left arm immediately after the procedure. She reports that her balance is better, and she did not use the walker. She is now able to knit again.  Today she reports that she is taking Primidone

## 2024-01-09 NOTE — HISTORY OF PRESENT ILLNESS
[> 3 months] : more  than 3 months [FreeTextEntry1] : tremor [de-identified] : CHARLENE CASTILLO is a 73 year old left handed female with PMH of severe essential tremor, osteoporosis, chronic back pain, carpal tunnel syndrome, sciatica, thyroid nodules, arthritis. She takes baby aspirin daily for prevention. No other anticoagulants/antiplatelets. She presents for neurosurgical consultation for high intensity focused ultrasound. She is under the care of neurologist Dr. Reddy. She was diagnosed with essential tremor ~4 years ago.  She isn't sure exactly when it started. It started and remains only in the left hand. No head, jaw, voice, right hand, or leg tremor. She is taking primidone 100 mg TID. She doesn't have side effects of medication. She is not sure if the effect is as good anymore from medication. She is on gabapentin for neuropathy as well. Since December her tremor has worsened. It is worse with stress and anxiety, and when she is doing activity/tasks.She likes to do crafts and sewing, and it is getting in the way of these activities. Cooking is difficult and she needs help. Has trouble holding fork with left hand, she sometimes uses right hand to compensate. Pouring coffee or walking with mug makes tremor worse. Family history of tremor in her mother. She retired unrelated to tremor. Balance is a little off which she states is related to left foot neuropathy, she has had a few falls in the past year.

## 2024-01-16 ENCOUNTER — NON-APPOINTMENT (OUTPATIENT)
Age: 74
End: 2024-01-16

## 2024-01-21 ENCOUNTER — TRANSCRIPTION ENCOUNTER (OUTPATIENT)
Age: 74
End: 2024-01-21

## 2024-02-08 ENCOUNTER — APPOINTMENT (OUTPATIENT)
Dept: INTERNAL MEDICINE | Facility: CLINIC | Age: 74
End: 2024-02-08
Payer: MEDICARE

## 2024-02-08 ENCOUNTER — NON-APPOINTMENT (OUTPATIENT)
Age: 74
End: 2024-02-08

## 2024-02-08 VITALS
HEART RATE: 72 BPM | SYSTOLIC BLOOD PRESSURE: 120 MMHG | DIASTOLIC BLOOD PRESSURE: 68 MMHG | WEIGHT: 126 LBS | HEIGHT: 64 IN | TEMPERATURE: 97.9 F | BODY MASS INDEX: 21.51 KG/M2 | OXYGEN SATURATION: 97 %

## 2024-02-08 DIAGNOSIS — D47.2 MONOCLONAL GAMMOPATHY: ICD-10-CM

## 2024-02-08 DIAGNOSIS — R19.5 OTHER FECAL ABNORMALITIES: ICD-10-CM

## 2024-02-08 DIAGNOSIS — R53.1 WEAKNESS: ICD-10-CM

## 2024-02-08 DIAGNOSIS — M62.81 MUSCLE WEAKNESS (GENERALIZED): ICD-10-CM

## 2024-02-08 DIAGNOSIS — M81.0 AGE-RELATED OSTEOPOROSIS W/OUT CURRENT PATHOLOGICAL FRACTURE: ICD-10-CM

## 2024-02-08 DIAGNOSIS — G25.0 ESSENTIAL TREMOR: ICD-10-CM

## 2024-02-08 DIAGNOSIS — C43.9 MALIGNANT MELANOMA OF SKIN, UNSPECIFIED: ICD-10-CM

## 2024-02-08 DIAGNOSIS — Z00.00 ENCOUNTER FOR GENERAL ADULT MEDICAL EXAMINATION W/OUT ABNORMAL FINDINGS: ICD-10-CM

## 2024-02-08 DIAGNOSIS — M25.561 PAIN IN RIGHT KNEE: ICD-10-CM

## 2024-02-08 DIAGNOSIS — Z87.898 PERSONAL HISTORY OF OTHER SPECIFIED CONDITIONS: ICD-10-CM

## 2024-02-08 DIAGNOSIS — E04.1 NONTOXIC SINGLE THYROID NODULE: ICD-10-CM

## 2024-02-08 DIAGNOSIS — J44.9 CHRONIC OBSTRUCTIVE PULMONARY DISEASE, UNSPECIFIED: ICD-10-CM

## 2024-02-08 DIAGNOSIS — R79.89 OTHER SPECIFIED ABNORMAL FINDINGS OF BLOOD CHEMISTRY: ICD-10-CM

## 2024-02-08 DIAGNOSIS — Z87.39 PERSONAL HISTORY OF OTHER DISEASES OF THE MUSCULOSKELETAL SYSTEM AND CONNECTIVE TISSUE: ICD-10-CM

## 2024-02-08 DIAGNOSIS — K63.5 POLYP OF COLON: ICD-10-CM

## 2024-02-08 DIAGNOSIS — Z23 ENCOUNTER FOR IMMUNIZATION: ICD-10-CM

## 2024-02-08 DIAGNOSIS — F32.A DEPRESSION, UNSPECIFIED: ICD-10-CM

## 2024-02-08 DIAGNOSIS — G47.00 INSOMNIA, UNSPECIFIED: ICD-10-CM

## 2024-02-08 DIAGNOSIS — H91.90 UNSPECIFIED HEARING LOSS, UNSPECIFIED EAR: ICD-10-CM

## 2024-02-08 DIAGNOSIS — Z20.822 CONTACT WITH AND (SUSPECTED) EXPOSURE TO COVID-19: ICD-10-CM

## 2024-02-08 DIAGNOSIS — C50.919 MALIGNANT NEOPLASM OF UNSPECIFIED SITE OF UNSPECIFIED FEMALE BREAST: ICD-10-CM

## 2024-02-08 DIAGNOSIS — Z01.818 ENCOUNTER FOR OTHER PREPROCEDURAL EXAMINATION: ICD-10-CM

## 2024-02-08 DIAGNOSIS — I65.22 OCCLUSION AND STENOSIS OF LEFT CAROTID ARTERY: ICD-10-CM

## 2024-02-08 DIAGNOSIS — E78.00 PURE HYPERCHOLESTEROLEMIA, UNSPECIFIED: ICD-10-CM

## 2024-02-08 DIAGNOSIS — M54.16 RADICULOPATHY, LUMBAR REGION: ICD-10-CM

## 2024-02-08 DIAGNOSIS — G62.9 POLYNEUROPATHY, UNSPECIFIED: ICD-10-CM

## 2024-02-08 PROCEDURE — G0439: CPT

## 2024-02-08 PROCEDURE — 36415 COLL VENOUS BLD VENIPUNCTURE: CPT

## 2024-02-08 PROCEDURE — 93000 ELECTROCARDIOGRAM COMPLETE: CPT

## 2024-02-08 RX ORDER — PRIMIDONE 50 MG/1
50 TABLET ORAL EVERY 8 HOURS
Qty: 3 | Refills: 0 | Status: ACTIVE | COMMUNITY

## 2024-02-08 NOTE — PHYSICAL EXAM
[No Acute Distress] : no acute distress [Well Nourished] : well nourished [Well Developed] : well developed [Well-Appearing] : well-appearing [Normal Sclera/Conjunctiva] : normal sclera/conjunctiva [PERRL] : pupils equal round and reactive to light [EOMI] : extraocular movements intact [Normal Outer Ear/Nose] : the outer ears and nose were normal in appearance [Normal Oropharynx] : the oropharynx was normal [No JVD] : no jugular venous distention [No Lymphadenopathy] : no lymphadenopathy [Supple] : supple [Thyroid Normal, No Nodules] : the thyroid was normal and there were no nodules present [No Respiratory Distress] : no respiratory distress  [No Accessory Muscle Use] : no accessory muscle use [Clear to Auscultation] : lungs were clear to auscultation bilaterally [Normal Rate] : normal rate  [Regular Rhythm] : with a regular rhythm [Normal S1, S2] : normal S1 and S2 [No Murmur] : no murmur heard [No Carotid Bruits] : no carotid bruits [No Abdominal Bruit] : a ~M bruit was not heard ~T in the abdomen [No Varicosities] : no varicosities [Pedal Pulses Present] : the pedal pulses are present [No Edema] : there was no peripheral edema [No Palpable Aorta] : no palpable aorta [No Extremity Clubbing/Cyanosis] : no extremity clubbing/cyanosis [Soft] : abdomen soft [Non Tender] : non-tender [Non-distended] : non-distended [No HSM] : no HSM [Normal Bowel Sounds] : normal bowel sounds [Normal Posterior Cervical Nodes] : no posterior cervical lymphadenopathy [Normal Anterior Cervical Nodes] : no anterior cervical lymphadenopathy [No CVA Tenderness] : no CVA  tenderness [No Spinal Tenderness] : no spinal tenderness [No Joint Swelling] : no joint swelling [Grossly Normal Strength/Tone] : grossly normal strength/tone [No Rash] : no rash [Coordination Grossly Intact] : coordination grossly intact [No Focal Deficits] : no focal deficits [Normal Gait] : normal gait [Deep Tendon Reflexes (DTR)] : deep tendon reflexes were 2+ and symmetric [Normal Affect] : the affect was normal [Normal Insight/Judgement] : insight and judgment were intact [Normal Appearance] : normal in appearance [No Masses] : no palpable masses [No Nipple Discharge] : no nipple discharge [No Axillary Lymphadenopathy] : no axillary lymphadenopathy [FreeTextEntry1] : deferred to  colon

## 2024-02-08 NOTE — HEALTH RISK ASSESSMENT
[No] : In the past 12 months have you used drugs other than those required for medical reasons? No [0] : 2) Feeling down, depressed, or hopeless: Not at all (0) [Never] : Never [No falls in past year] : Patient reported no falls in the past year [PHQ-2 Negative - No further assessment needed] : PHQ-2 Negative - No further assessment needed [None] : None [With Family] : lives with family [] :  [Fully functional (bathing, dressing, toileting, transferring, walking, feeding)] : Fully functional (bathing, dressing, toileting, transferring, walking, feeding) [Fully functional (using the telephone, shopping, preparing meals, housekeeping, doing laundry, using] : Fully functional and needs no help or supervision to perform IADLs (using the telephone, shopping, preparing meals, housekeeping, doing laundry, using transportation, managing medications and managing finances) [Reports changes in hearing] : Reports changes in hearing [Smoke Detector] : smoke detector [Carbon Monoxide Detector] : carbon monoxide detector [Seat Belt] :  uses seat belt [Sunscreen] : uses sunscreen [With Patient/Caregiver] : , with patient/caregiver [20 or more] : 20 or more [> 15 Years] : > 15 Years [de-identified] : no [de-identified] : reg [[id number]] : 0 [EyeExamDate] : 1/1/20 [Change in mental status noted] : No change in mental status noted [Guns at Home] : no guns at home [MammogramDate] : 8/23 [BoneDensityDate] : 1/1/23 [ColonoscopyDate] : 07/2023 [AdvancecareDate] : 2/24

## 2024-02-11 LAB
25(OH)D3 SERPL-MCNC: 51.2 NG/ML
ALBUMIN SERPL ELPH-MCNC: 4.6 G/DL
ALP BLD-CCNC: 95 U/L
ALT SERPL-CCNC: 23 U/L
APPEARANCE: CLEAR
AST SERPL-CCNC: 29 U/L
BACTERIA: NEGATIVE /HPF
BASOPHILS # BLD AUTO: 0.05 K/UL
BASOPHILS NFR BLD AUTO: 0.8 %
BILIRUB DIRECT SERPL-MCNC: 0.1 MG/DL
BILIRUB INDIRECT SERPL-MCNC: 0.2 MG/DL
BILIRUB SERPL-MCNC: 0.3 MG/DL
BILIRUBIN URINE: NEGATIVE
BLOOD URINE: NEGATIVE
CAST: 0 /LPF
CHOLEST SERPL-MCNC: 162 MG/DL
COLOR: NORMAL
EOSINOPHIL # BLD AUTO: 0.25 K/UL
EOSINOPHIL NFR BLD AUTO: 4 %
EPITHELIAL CELLS: 0 /HPF
ESTIMATED AVERAGE GLUCOSE: 111 MG/DL
FERRITIN SERPL-MCNC: 88 NG/ML
GLUCOSE QUALITATIVE U: NEGATIVE MG/DL
HBA1C MFR BLD HPLC: 5.5 %
HCT VFR BLD CALC: 45.2 %
HCV AB SER QL: NONREACTIVE
HCV S/CO RATIO: 0.08 S/CO
HDLC SERPL-MCNC: 48 MG/DL
HGB BLD-MCNC: 14.3 G/DL
IMM GRANULOCYTES NFR BLD AUTO: 0.2 %
IRON SERPL-MCNC: 102 UG/DL
KETONES URINE: NEGATIVE MG/DL
LDLC SERPL CALC-MCNC: 94 MG/DL
LEUKOCYTE ESTERASE URINE: NEGATIVE
LYMPHOCYTES # BLD AUTO: 1.61 K/UL
LYMPHOCYTES NFR BLD AUTO: 25.8 %
MAN DIFF?: NORMAL
MCHC RBC-ENTMCNC: 30.1 PG
MCHC RBC-ENTMCNC: 31.6 GM/DL
MCV RBC AUTO: 95.2 FL
MICROSCOPIC-UA: NORMAL
MONOCYTES # BLD AUTO: 0.61 K/UL
MONOCYTES NFR BLD AUTO: 9.8 %
NEUTROPHILS # BLD AUTO: 3.72 K/UL
NEUTROPHILS NFR BLD AUTO: 59.4 %
NITRITE URINE: NEGATIVE
NONHDLC SERPL-MCNC: 114 MG/DL
PH URINE: 7
PLATELET # BLD AUTO: 286 K/UL
PROT SERPL-MCNC: 6.9 G/DL
PROTEIN URINE: NEGATIVE MG/DL
RBC # BLD: 4.75 M/UL
RBC # FLD: 13.8 %
RED BLOOD CELLS URINE: 5 /HPF
SPECIFIC GRAVITY URINE: 1.01
TRIGL SERPL-MCNC: 115 MG/DL
UROBILINOGEN URINE: 0.2 MG/DL
WBC # FLD AUTO: 6.25 K/UL
WHITE BLOOD CELLS URINE: 0 /HPF

## 2024-02-12 ENCOUNTER — NON-APPOINTMENT (OUTPATIENT)
Age: 74
End: 2024-02-12

## 2024-02-12 DIAGNOSIS — R82.90 UNSPECIFIED ABNORMAL FINDINGS IN URINE: ICD-10-CM

## 2024-02-15 LAB
APPEARANCE: CLEAR
BACTERIA UR CULT: NORMAL
BACTERIA: NEGATIVE /HPF
BILIRUBIN URINE: NEGATIVE
BLOOD URINE: NEGATIVE
CAST: 0 /LPF
COLOR: YELLOW
EPITHELIAL CELLS: 0 /HPF
GLUCOSE QUALITATIVE U: NEGATIVE MG/DL
KETONES URINE: NEGATIVE MG/DL
LEUKOCYTE ESTERASE URINE: NEGATIVE
MICROSCOPIC-UA: NORMAL
NITRITE URINE: NEGATIVE
PH URINE: 7.5
PROTEIN URINE: NEGATIVE MG/DL
RED BLOOD CELLS URINE: 0 /HPF
SPECIFIC GRAVITY URINE: 1.01
UROBILINOGEN URINE: 0.2 MG/DL
WHITE BLOOD CELLS URINE: 0 /HPF

## 2024-02-16 ENCOUNTER — TRANSCRIPTION ENCOUNTER (OUTPATIENT)
Age: 74
End: 2024-02-16

## 2024-02-20 ENCOUNTER — TRANSCRIPTION ENCOUNTER (OUTPATIENT)
Age: 74
End: 2024-02-20

## 2024-03-17 ENCOUNTER — RX RENEWAL (OUTPATIENT)
Age: 74
End: 2024-03-17

## 2024-03-17 RX ORDER — ATORVASTATIN CALCIUM 10 MG/1
10 TABLET, FILM COATED ORAL
Qty: 90 | Refills: 0 | Status: ACTIVE | COMMUNITY
Start: 2021-10-28 | End: 1900-01-01

## 2024-03-20 ENCOUNTER — TRANSCRIPTION ENCOUNTER (OUTPATIENT)
Age: 74
End: 2024-03-20

## 2024-03-25 ENCOUNTER — APPOINTMENT (OUTPATIENT)
Dept: MRI IMAGING | Facility: HOSPITAL | Age: 74
End: 2024-03-25

## 2024-03-25 ENCOUNTER — OUTPATIENT (OUTPATIENT)
Dept: OUTPATIENT SERVICES | Facility: HOSPITAL | Age: 74
LOS: 1 days | End: 2024-03-25
Payer: MEDICARE

## 2024-03-25 DIAGNOSIS — Z90.710 ACQUIRED ABSENCE OF BOTH CERVIX AND UTERUS: Chronic | ICD-10-CM

## 2024-03-25 DIAGNOSIS — Z98.890 OTHER SPECIFIED POSTPROCEDURAL STATES: Chronic | ICD-10-CM

## 2024-03-25 DIAGNOSIS — Z98.49 CATARACT EXTRACTION STATUS, UNSPECIFIED EYE: Chronic | ICD-10-CM

## 2024-03-25 DIAGNOSIS — C43.9 MALIGNANT MELANOMA OF SKIN, UNSPECIFIED: Chronic | ICD-10-CM

## 2024-03-25 DIAGNOSIS — Z90.11 ACQUIRED ABSENCE OF RIGHT BREAST AND NIPPLE: Chronic | ICD-10-CM

## 2024-03-25 DIAGNOSIS — G25.0 ESSENTIAL TREMOR: ICD-10-CM

## 2024-03-25 PROCEDURE — A9585: CPT

## 2024-03-25 PROCEDURE — 70553 MRI BRAIN STEM W/O & W/DYE: CPT | Mod: 26,MH

## 2024-03-25 PROCEDURE — 70553 MRI BRAIN STEM W/O & W/DYE: CPT

## 2024-04-09 ENCOUNTER — APPOINTMENT (OUTPATIENT)
Dept: NEUROSURGERY | Facility: CLINIC | Age: 74
End: 2024-04-09
Payer: MEDICARE

## 2024-04-09 VITALS
HEART RATE: 64 BPM | OXYGEN SATURATION: 96 % | HEIGHT: 64 IN | DIASTOLIC BLOOD PRESSURE: 71 MMHG | WEIGHT: 126 LBS | SYSTOLIC BLOOD PRESSURE: 109 MMHG | BODY MASS INDEX: 21.51 KG/M2

## 2024-04-09 DIAGNOSIS — M54.12 RADICULOPATHY, CERVICAL REGION: ICD-10-CM

## 2024-04-09 PROCEDURE — 99213 OFFICE O/P EST LOW 20 MIN: CPT

## 2024-04-09 NOTE — REASON FOR VISIT
[Follow-Up: _____] : a [unfilled] follow-up visit [FreeTextEntry1] : S/P HiFu ablation of thalamus with magnetic resonance imaging guidance on 12/14/23

## 2024-04-09 NOTE — PHYSICAL EXAM
[General Appearance - Alert] : alert [General Appearance - In No Acute Distress] : in no acute distress [Oriented To Time, Place, And Person] : oriented to person, place, and time [Impaired Insight] : insight and judgment were intact [Cranial Nerves Optic (II)] : visual acuity intact bilaterally,  pupils equal round and reactive to light [Cranial Nerves Oculomotor (III)] : extraocular motion intact [Cranial Nerves Trigeminal (V)] : facial sensation intact symmetrically [Cranial Nerves Facial (VII)] : face symmetrical [Cranial Nerves Vestibulocochlear (VIII)] : hearing was intact bilaterally [Cranial Nerves Glossopharyngeal (IX)] : tongue and palate midline [Cranial Nerves Accessory (XI - Cranial And Spinal)] : head turning and shoulder shrug symmetric [Cranial Nerves Hypoglossal (XII)] : there was no tongue deviation with protrusion [Motor Tone] : muscle tone was normal in all four extremities [Motor Strength] : muscle strength was normal in all four extremities [Balance] : balance was intact [] : no respiratory distress [Respiration, Rhythm And Depth] : normal respiratory rhythm and effort [Abnormal Walk] : normal gait

## 2024-04-09 NOTE — HISTORY OF PRESENT ILLNESS
[> 3 months] : more  than 3 months [FreeTextEntry1] : tremor [de-identified] : CHARLENE CASTILLO is a 74 year old left handed female with PMH of severe essential tremor, osteoporosis, chronic back pain, carpal tunnel syndrome, sciatica, thyroid nodules, arthritis. She takes baby aspirin daily for prevention. No other anticoagulants/antiplatelets. She presents for neurosurgical consultation for high intensity focused ultrasound. She is under the care of neurologist Dr. Reddy. She was diagnosed with essential tremor ~4 years ago.  She isn't sure exactly when it started. It started and remains only in the left hand. No head, jaw, voice, right hand, or leg tremor. She is taking primidone 100 mg TID. She doesn't have side effects of medication. She is not sure if the effect is as good anymore from medication. She is on gabapentin for neuropathy as well. Since December her tremor has worsened. It is worse with stress and anxiety, and when she is doing activity/tasks.She likes to do crafts and sewing, and it is getting in the way of these activities. Cooking is difficult and she needs help. Has trouble holding fork with left hand, she sometimes uses right hand to compensate. Pouring coffee or walking with mug makes tremor worse. Family history of tremor in her mother. She retired unrelated to tremor. Balance is a little off which she states is related to left foot neuropathy, she has had a few falls in the past year. She is s/p HIFU right Vim thalamus for left hand tremor on 12/14/23.  Today she presents for 3 month follow up. She is happy with tremor relief. She is tapering off Primidone. 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 Cooking, applying makeup are easier. No side effects reported. Reports numbness in b/l hands that was present prior. She has tingling down right arm x 1 month. She has been working and moving fabric. Also reports right arm pain. No falls.

## 2024-04-09 NOTE — ASSESSMENT
[FreeTextEntry1] : Martha Pugh is a 74 year old woman S/P  HiFU for treatment of left hand tremor 12/14/23 and is doing excellent.   Plan: -Continue to f/u with neurology -MR cervical spine for cervical radiculopathy -Return to office to discuss results of imaging

## 2024-04-16 ENCOUNTER — RX RENEWAL (OUTPATIENT)
Age: 74
End: 2024-04-16

## 2024-04-16 RX ORDER — DULOXETINE HYDROCHLORIDE 60 MG/1
60 CAPSULE, DELAYED RELEASE PELLETS ORAL
Qty: 90 | Refills: 1 | Status: ACTIVE | COMMUNITY
Start: 2021-03-31 | End: 1900-01-01

## 2024-04-21 ENCOUNTER — TRANSCRIPTION ENCOUNTER (OUTPATIENT)
Age: 74
End: 2024-04-21

## 2024-05-27 ENCOUNTER — TRANSCRIPTION ENCOUNTER (OUTPATIENT)
Age: 74
End: 2024-05-27

## 2024-06-24 ENCOUNTER — APPOINTMENT (OUTPATIENT)
Dept: INTERNAL MEDICINE | Facility: CLINIC | Age: 74
End: 2024-06-24

## 2024-06-26 ENCOUNTER — TRANSCRIPTION ENCOUNTER (OUTPATIENT)
Age: 74
End: 2024-06-26

## 2024-06-27 ENCOUNTER — TRANSCRIPTION ENCOUNTER (OUTPATIENT)
Age: 74
End: 2024-06-27

## 2024-07-10 ENCOUNTER — TRANSCRIPTION ENCOUNTER (OUTPATIENT)
Age: 74
End: 2024-07-10

## 2024-07-16 ENCOUNTER — TRANSCRIPTION ENCOUNTER (OUTPATIENT)
Age: 74
End: 2024-07-16

## 2024-07-29 ENCOUNTER — TRANSCRIPTION ENCOUNTER (OUTPATIENT)
Age: 74
End: 2024-07-29

## 2024-08-06 ENCOUNTER — NON-APPOINTMENT (OUTPATIENT)
Age: 74
End: 2024-08-06

## 2024-08-07 ENCOUNTER — APPOINTMENT (OUTPATIENT)
Dept: INTERNAL MEDICINE | Facility: CLINIC | Age: 74
End: 2024-08-07

## 2024-08-07 PROCEDURE — 94060 EVALUATION OF WHEEZING: CPT

## 2024-08-07 PROCEDURE — 99214 OFFICE O/P EST MOD 30 MIN: CPT | Mod: 25

## 2024-08-07 PROCEDURE — ZZZZZ: CPT

## 2024-08-07 PROCEDURE — 94727 GAS DIL/WSHOT DETER LNG VOL: CPT

## 2024-08-07 PROCEDURE — 94729 DIFFUSING CAPACITY: CPT

## 2024-08-07 NOTE — REASON FOR VISIT
[Follow-Up] : a follow-up visit [COPD] : COPD Olanzapine Counseling- I discussed with the patient the common side effects of olanzapine including but are not limited to: lack of energy, dry mouth, increased appetite, sleepiness, tremor, constipation, dizziness, changes in behavior, or restlessness.  Explained that teenagers are more likely to experience headaches, abdominal pain, pain in the arms or legs, tiredness, and sleepiness.  Serious side effects include but are not limited: increased risk of death in elderly patients who are confused, have memory loss, or dementia-related psychosis; hyperglycemia; increased cholesterol and triglycerides; and weight gain.

## 2024-08-07 NOTE — DISCUSSION/SUMMARY
[FreeTextEntry1] : Ms. Pugh presents for a follow-up evaluation.  She has significant COPD.  She has been well-controlled on Arnuity Ellipta.  She has not required oral steroid therapy.  Complete pulmonary function tests were reviewed and have remained stable.  She will follow-up in 6 months.

## 2024-08-07 NOTE — HISTORY OF PRESENT ILLNESS
[TextBox_4] : Ms. Pugh presents for follow-up evaluation.  She has history of COPD.  She continues on Arnuity Ellipta 1 puff daily.  She gets occasional shortness of breath with exertion.  Patient has no nocturnal symptoms of cough or dyspnea.

## 2024-08-07 NOTE — PROCEDURE
[FreeTextEntry1] : Complete pulmonary function test were performed. FVC 2.48 L which is 91% predicted.  FEV1 1.35 L which is 64% predicted.  FEV1/FVC ratio 54%.  FEF 25/75% 0.56 L/s which is 32% predicted.  PEF 5.12 L/s which is 95% predicted. Postbronchodilator: FEV1 1.34 L which is 64% predicted.  PEF 5.03 L/s which is 93% predicted.  There is no significant bronchodilator response. Total lung capacity 4.48 L which is 91% predicted. Diffusing capacity 37%.  Complete pulmonary function test show evidence for severe obstructive lung disease with no significant bronchodilator response.  There is mild air trapping.  Diffusing capacity is severely impaired.

## 2024-08-07 NOTE — HEALTH RISK ASSESSMENT
[No] : In the past 12 months have you used drugs other than those required for medical reasons? No [0] : 2) Feeling down, depressed, or hopeless: Not at all (0) [PHQ-2 Negative - No further assessment needed] : PHQ-2 Negative - No further assessment needed [Never] : Never [Patient reported mammogram was normal] : Patient reported mammogram was normal [Patient reported bone density results were normal] : Patient reported bone density results were normal [Patient reported colonoscopy was normal] : Patient reported colonoscopy was normal [MammogramDate] : 2023 [PapSmearComments] : appt with gynecologist this month [BoneDensityDate] : 2023 [BoneDensityComments] : osteoporosis [ColonoscopyDate] : 2023

## 2024-08-26 ENCOUNTER — NON-APPOINTMENT (OUTPATIENT)
Age: 74
End: 2024-08-26

## 2024-08-26 ENCOUNTER — APPOINTMENT (OUTPATIENT)
Dept: INTERNAL MEDICINE | Facility: CLINIC | Age: 74
End: 2024-08-26

## 2024-08-26 VITALS
DIASTOLIC BLOOD PRESSURE: 59 MMHG | BODY MASS INDEX: 19.81 KG/M2 | WEIGHT: 116 LBS | HEART RATE: 69 BPM | TEMPERATURE: 98 F | SYSTOLIC BLOOD PRESSURE: 98 MMHG | HEIGHT: 64 IN | OXYGEN SATURATION: 95 %

## 2024-08-26 VITALS — DIASTOLIC BLOOD PRESSURE: 60 MMHG | SYSTOLIC BLOOD PRESSURE: 90 MMHG

## 2024-08-26 DIAGNOSIS — C50.919 MALIGNANT NEOPLASM OF UNSPECIFIED SITE OF UNSPECIFIED FEMALE BREAST: ICD-10-CM

## 2024-08-26 DIAGNOSIS — E04.1 NONTOXIC SINGLE THYROID NODULE: ICD-10-CM

## 2024-08-26 DIAGNOSIS — D47.2 MONOCLONAL GAMMOPATHY: ICD-10-CM

## 2024-08-26 DIAGNOSIS — E78.00 PURE HYPERCHOLESTEROLEMIA, UNSPECIFIED: ICD-10-CM

## 2024-08-26 DIAGNOSIS — C43.9 MALIGNANT MELANOMA OF SKIN, UNSPECIFIED: ICD-10-CM

## 2024-08-26 DIAGNOSIS — G25.0 ESSENTIAL TREMOR: ICD-10-CM

## 2024-08-26 DIAGNOSIS — J44.9 CHRONIC OBSTRUCTIVE PULMONARY DISEASE, UNSPECIFIED: ICD-10-CM

## 2024-08-26 DIAGNOSIS — F32.A DEPRESSION, UNSPECIFIED: ICD-10-CM

## 2024-08-26 DIAGNOSIS — R79.89 OTHER SPECIFIED ABNORMAL FINDINGS OF BLOOD CHEMISTRY: ICD-10-CM

## 2024-08-26 DIAGNOSIS — I65.22 OCCLUSION AND STENOSIS OF LEFT CAROTID ARTERY: ICD-10-CM

## 2024-08-26 PROCEDURE — G2211 COMPLEX E/M VISIT ADD ON: CPT | Mod: NC

## 2024-08-26 PROCEDURE — 99213 OFFICE O/P EST LOW 20 MIN: CPT | Mod: 25

## 2024-08-26 NOTE — HISTORY OF PRESENT ILLNESS
[FreeTextEntry1] : Pt is here for follow up of multiple medical problems including tremor TSH and hyperlipidemia. Py notes she  is recovering from URI>

## 2024-09-05 ENCOUNTER — RX RENEWAL (OUTPATIENT)
Age: 74
End: 2024-09-05

## 2024-10-08 ENCOUNTER — NON-APPOINTMENT (OUTPATIENT)
Age: 74
End: 2024-10-08

## 2024-10-08 ENCOUNTER — TRANSCRIPTION ENCOUNTER (OUTPATIENT)
Age: 74
End: 2024-10-08

## 2024-11-11 ENCOUNTER — TRANSCRIPTION ENCOUNTER (OUTPATIENT)
Age: 74
End: 2024-11-11

## 2024-12-13 ENCOUNTER — TRANSCRIPTION ENCOUNTER (OUTPATIENT)
Age: 74
End: 2024-12-13

## 2024-12-16 ENCOUNTER — TRANSCRIPTION ENCOUNTER (OUTPATIENT)
Age: 74
End: 2024-12-16

## 2024-12-17 ENCOUNTER — APPOINTMENT (OUTPATIENT)
Dept: INTERNAL MEDICINE | Facility: CLINIC | Age: 74
End: 2024-12-17
Payer: MEDICARE

## 2024-12-17 VITALS
SYSTOLIC BLOOD PRESSURE: 102 MMHG | OXYGEN SATURATION: 95 % | WEIGHT: 116 LBS | TEMPERATURE: 97.8 F | DIASTOLIC BLOOD PRESSURE: 55 MMHG | HEIGHT: 64 IN | BODY MASS INDEX: 19.81 KG/M2 | HEART RATE: 78 BPM

## 2024-12-17 DIAGNOSIS — Z23 ENCOUNTER FOR IMMUNIZATION: ICD-10-CM

## 2024-12-17 DIAGNOSIS — G47.00 INSOMNIA, UNSPECIFIED: ICD-10-CM

## 2024-12-17 DIAGNOSIS — F32.A DEPRESSION, UNSPECIFIED: ICD-10-CM

## 2024-12-17 PROCEDURE — 90662 IIV NO PRSV INCREASED AG IM: CPT

## 2024-12-17 PROCEDURE — 99214 OFFICE O/P EST MOD 30 MIN: CPT | Mod: 25

## 2024-12-17 PROCEDURE — G0008: CPT

## 2025-01-14 ENCOUNTER — APPOINTMENT (OUTPATIENT)
Dept: NEUROSURGERY | Facility: CLINIC | Age: 75
End: 2025-01-14
Payer: MEDICARE

## 2025-01-14 DIAGNOSIS — M48.02 SPINAL STENOSIS, CERVICAL REGION: ICD-10-CM

## 2025-01-14 PROCEDURE — 99211 OFF/OP EST MAY X REQ PHY/QHP: CPT

## 2025-01-22 ENCOUNTER — TRANSCRIPTION ENCOUNTER (OUTPATIENT)
Age: 75
End: 2025-01-22

## 2025-02-05 ENCOUNTER — INPATIENT (INPATIENT)
Facility: HOSPITAL | Age: 75
LOS: 4 days | Discharge: HOME | DRG: 336 | End: 2025-02-10
Attending: SURGERY | Admitting: SURGERY
Payer: MEDICARE

## 2025-02-05 VITALS
WEIGHT: 118.39 LBS | SYSTOLIC BLOOD PRESSURE: 161 MMHG | HEART RATE: 62 BPM | DIASTOLIC BLOOD PRESSURE: 81 MMHG | OXYGEN SATURATION: 100 % | RESPIRATION RATE: 18 BRPM

## 2025-02-05 DIAGNOSIS — Z90.11 ACQUIRED ABSENCE OF RIGHT BREAST AND NIPPLE: Chronic | ICD-10-CM

## 2025-02-05 DIAGNOSIS — Z90.710 ACQUIRED ABSENCE OF BOTH CERVIX AND UTERUS: Chronic | ICD-10-CM

## 2025-02-05 DIAGNOSIS — C43.9 MALIGNANT MELANOMA OF SKIN, UNSPECIFIED: Chronic | ICD-10-CM

## 2025-02-05 DIAGNOSIS — Z98.49 CATARACT EXTRACTION STATUS, UNSPECIFIED EYE: Chronic | ICD-10-CM

## 2025-02-05 DIAGNOSIS — Z98.890 OTHER SPECIFIED POSTPROCEDURAL STATES: Chronic | ICD-10-CM

## 2025-02-05 PROCEDURE — 99285 EMERGENCY DEPT VISIT HI MDM: CPT

## 2025-02-06 DIAGNOSIS — K56.609 UNSPECIFIED INTESTINAL OBSTRUCTION, UNSPECIFIED AS TO PARTIAL VERSUS COMPLETE OBSTRUCTION: ICD-10-CM

## 2025-02-06 LAB
ALBUMIN SERPL ELPH-MCNC: 3.6 G/DL — SIGNIFICANT CHANGE UP (ref 3.3–5)
ALP SERPL-CCNC: 99 U/L — SIGNIFICANT CHANGE UP (ref 40–120)
ALT FLD-CCNC: 31 U/L — SIGNIFICANT CHANGE UP (ref 12–78)
ANION GAP SERPL CALC-SCNC: 4 MMOL/L — LOW (ref 5–17)
APPEARANCE UR: CLEAR — SIGNIFICANT CHANGE UP
APTT BLD: 37 SEC — HIGH (ref 24.5–35.6)
AST SERPL-CCNC: 31 U/L — SIGNIFICANT CHANGE UP (ref 15–37)
BASOPHILS # BLD AUTO: 0.05 K/UL — SIGNIFICANT CHANGE UP (ref 0–0.2)
BASOPHILS NFR BLD AUTO: 0.7 % — SIGNIFICANT CHANGE UP (ref 0–2)
BILIRUB SERPL-MCNC: 0.5 MG/DL — SIGNIFICANT CHANGE UP (ref 0.2–1.2)
BILIRUB UR-MCNC: NEGATIVE — SIGNIFICANT CHANGE UP
BLD GP AB SCN SERPL QL: SIGNIFICANT CHANGE UP
BUN SERPL-MCNC: 21 MG/DL — SIGNIFICANT CHANGE UP (ref 7–23)
CALCIUM SERPL-MCNC: 10.2 MG/DL — HIGH (ref 8.5–10.1)
CHLORIDE SERPL-SCNC: 108 MMOL/L — SIGNIFICANT CHANGE UP (ref 96–108)
CO2 SERPL-SCNC: 27 MMOL/L — SIGNIFICANT CHANGE UP (ref 22–31)
COLOR SPEC: YELLOW — SIGNIFICANT CHANGE UP
CREAT SERPL-MCNC: 1.1 MG/DL — SIGNIFICANT CHANGE UP (ref 0.5–1.3)
DIFF PNL FLD: NEGATIVE — SIGNIFICANT CHANGE UP
EGFR: 53 ML/MIN/1.73M2 — LOW
EOSINOPHIL # BLD AUTO: 0.2 K/UL — SIGNIFICANT CHANGE UP (ref 0–0.5)
EOSINOPHIL NFR BLD AUTO: 2.7 % — SIGNIFICANT CHANGE UP (ref 0–6)
GLUCOSE SERPL-MCNC: 121 MG/DL — HIGH (ref 70–99)
GLUCOSE UR QL: NEGATIVE MG/DL — SIGNIFICANT CHANGE UP
HCT VFR BLD CALC: 43.3 % — SIGNIFICANT CHANGE UP (ref 34.5–45)
HGB BLD-MCNC: 14.2 G/DL — SIGNIFICANT CHANGE UP (ref 11.5–15.5)
IMM GRANULOCYTES NFR BLD AUTO: 0.3 % — SIGNIFICANT CHANGE UP (ref 0–0.9)
INR BLD: 0.97 RATIO — SIGNIFICANT CHANGE UP (ref 0.85–1.16)
KETONES UR-MCNC: NEGATIVE MG/DL — SIGNIFICANT CHANGE UP
LACTATE SERPL-SCNC: 1.9 MMOL/L — SIGNIFICANT CHANGE UP (ref 0.7–2)
LEUKOCYTE ESTERASE UR-ACNC: NEGATIVE — SIGNIFICANT CHANGE UP
LIDOCAIN IGE QN: 47 U/L — SIGNIFICANT CHANGE UP (ref 13–75)
LYMPHOCYTES # BLD AUTO: 2.76 K/UL — SIGNIFICANT CHANGE UP (ref 1–3.3)
LYMPHOCYTES # BLD AUTO: 37.4 % — SIGNIFICANT CHANGE UP (ref 13–44)
MCHC RBC-ENTMCNC: 30.2 PG — SIGNIFICANT CHANGE UP (ref 27–34)
MCHC RBC-ENTMCNC: 32.8 G/DL — SIGNIFICANT CHANGE UP (ref 32–36)
MCV RBC AUTO: 92.1 FL — SIGNIFICANT CHANGE UP (ref 80–100)
MONOCYTES # BLD AUTO: 0.82 K/UL — SIGNIFICANT CHANGE UP (ref 0–0.9)
MONOCYTES NFR BLD AUTO: 11.1 % — SIGNIFICANT CHANGE UP (ref 2–14)
NEUTROPHILS # BLD AUTO: 3.52 K/UL — SIGNIFICANT CHANGE UP (ref 1.8–7.4)
NEUTROPHILS NFR BLD AUTO: 47.8 % — SIGNIFICANT CHANGE UP (ref 43–77)
NITRITE UR-MCNC: NEGATIVE — SIGNIFICANT CHANGE UP
PH UR: 6 — SIGNIFICANT CHANGE UP (ref 5–8)
PLATELET # BLD AUTO: 269 K/UL — SIGNIFICANT CHANGE UP (ref 150–400)
POTASSIUM SERPL-MCNC: 5.1 MMOL/L — SIGNIFICANT CHANGE UP (ref 3.5–5.3)
POTASSIUM SERPL-SCNC: 5.1 MMOL/L — SIGNIFICANT CHANGE UP (ref 3.5–5.3)
PROT SERPL-MCNC: 7.1 GM/DL — SIGNIFICANT CHANGE UP (ref 6–8.3)
PROT UR-MCNC: NEGATIVE MG/DL — SIGNIFICANT CHANGE UP
PROTHROM AB SERPL-ACNC: 11.2 SEC — SIGNIFICANT CHANGE UP (ref 9.9–13.4)
RBC # BLD: 4.7 M/UL — SIGNIFICANT CHANGE UP (ref 3.8–5.2)
RBC # FLD: 13.2 % — SIGNIFICANT CHANGE UP (ref 10.3–14.5)
SODIUM SERPL-SCNC: 139 MMOL/L — SIGNIFICANT CHANGE UP (ref 135–145)
SP GR SPEC: >1.03 — HIGH (ref 1–1.03)
TROPONIN I, HIGH SENSITIVITY RESULT: 46.64 NG/L — SIGNIFICANT CHANGE UP
UROBILINOGEN FLD QL: 0.2 MG/DL — SIGNIFICANT CHANGE UP (ref 0.2–1)
WBC # BLD: 7.37 K/UL — SIGNIFICANT CHANGE UP (ref 3.8–10.5)
WBC # FLD AUTO: 7.37 K/UL — SIGNIFICANT CHANGE UP (ref 3.8–10.5)

## 2025-02-06 PROCEDURE — 36415 COLL VENOUS BLD VENIPUNCTURE: CPT

## 2025-02-06 PROCEDURE — 83605 ASSAY OF LACTIC ACID: CPT

## 2025-02-06 PROCEDURE — 87640 STAPH A DNA AMP PROBE: CPT

## 2025-02-06 PROCEDURE — 81003 URINALYSIS AUTO W/O SCOPE: CPT

## 2025-02-06 PROCEDURE — 87641 MR-STAPH DNA AMP PROBE: CPT

## 2025-02-06 PROCEDURE — C9399: CPT

## 2025-02-06 PROCEDURE — 93010 ELECTROCARDIOGRAM REPORT: CPT

## 2025-02-06 PROCEDURE — 83735 ASSAY OF MAGNESIUM: CPT

## 2025-02-06 PROCEDURE — 74019 RADEX ABDOMEN 2 VIEWS: CPT | Mod: 26

## 2025-02-06 PROCEDURE — 85027 COMPLETE CBC AUTOMATED: CPT

## 2025-02-06 PROCEDURE — 74177 CT ABD & PELVIS W/CONTRAST: CPT | Mod: 26

## 2025-02-06 PROCEDURE — 85025 COMPLETE CBC W/AUTO DIFF WBC: CPT

## 2025-02-06 PROCEDURE — 84100 ASSAY OF PHOSPHORUS: CPT

## 2025-02-06 PROCEDURE — 74019 RADEX ABDOMEN 2 VIEWS: CPT

## 2025-02-06 PROCEDURE — 71045 X-RAY EXAM CHEST 1 VIEW: CPT

## 2025-02-06 PROCEDURE — 88305 TISSUE EXAM BY PATHOLOGIST: CPT

## 2025-02-06 PROCEDURE — 80048 BASIC METABOLIC PNL TOTAL CA: CPT

## 2025-02-06 PROCEDURE — 74177 CT ABD & PELVIS W/CONTRAST: CPT | Mod: MC

## 2025-02-06 RX ORDER — ONDANSETRON 4 MG/1
4 TABLET, ORALLY DISINTEGRATING ORAL ONCE
Refills: 0 | Status: COMPLETED | OUTPATIENT
Start: 2025-02-06 | End: 2025-02-06

## 2025-02-06 RX ORDER — HEPARIN SODIUM,PORCINE 10000/ML
5000 VIAL (ML) INJECTION EVERY 8 HOURS
Refills: 0 | Status: DISCONTINUED | OUTPATIENT
Start: 2025-02-06 | End: 2025-02-10

## 2025-02-06 RX ORDER — ACETAMINOPHEN 160 MG/5ML
1000 SUSPENSION ORAL EVERY 6 HOURS
Refills: 0 | Status: COMPLETED | OUTPATIENT
Start: 2025-02-06 | End: 2025-02-07

## 2025-02-06 RX ORDER — ACETAMINOPHEN 160 MG/5ML
1000 SUSPENSION ORAL ONCE
Refills: 0 | Status: COMPLETED | OUTPATIENT
Start: 2025-02-06 | End: 2025-02-06

## 2025-02-06 RX ORDER — CYCLOSPORINE 0.5 MG/ML
1 EMULSION OPHTHALMIC
Refills: 0 | DISCHARGE

## 2025-02-06 RX ORDER — HYDROMORPHONE HYDROCHLORIDE 4 MG/ML
0.5 INJECTION, SOLUTION INTRAMUSCULAR; INTRAVENOUS; SUBCUTANEOUS EVERY 4 HOURS
Refills: 0 | Status: DISCONTINUED | OUTPATIENT
Start: 2025-02-06 | End: 2025-02-10

## 2025-02-06 RX ORDER — SODIUM CHLORIDE 9 G/ML
1000 INJECTION, SOLUTION INTRAVENOUS ONCE
Refills: 0 | Status: COMPLETED | OUTPATIENT
Start: 2025-02-06 | End: 2025-02-06

## 2025-02-06 RX ORDER — BACTERIOSTATIC SODIUM CHLORIDE 0.9 %
1000 VIAL (ML) INJECTION ONCE
Refills: 0 | Status: COMPLETED | OUTPATIENT
Start: 2025-02-06 | End: 2025-02-06

## 2025-02-06 RX ORDER — MORPHINE SULFATE 60 MG/1
4 TABLET, FILM COATED, EXTENDED RELEASE ORAL ONCE
Refills: 0 | Status: DISCONTINUED | OUTPATIENT
Start: 2025-02-06 | End: 2025-02-06

## 2025-02-06 RX ORDER — ONDANSETRON 4 MG/1
4 TABLET, ORALLY DISINTEGRATING ORAL EVERY 8 HOURS
Refills: 0 | Status: DISCONTINUED | OUTPATIENT
Start: 2025-02-06 | End: 2025-02-10

## 2025-02-06 RX ORDER — ACETAMINOPHEN 160 MG/5ML
1000 SUSPENSION ORAL EVERY 6 HOURS
Refills: 0 | Status: DISCONTINUED | OUTPATIENT
Start: 2025-02-06 | End: 2025-02-10

## 2025-02-06 RX ORDER — SODIUM CHLORIDE 9 G/ML
500 INJECTION, SOLUTION INTRAVENOUS ONCE
Refills: 0 | Status: COMPLETED | OUTPATIENT
Start: 2025-02-06 | End: 2025-02-06

## 2025-02-06 RX ORDER — DEXTROSE MONOHYDRATE, SODIUM CHLORIDE, AND POTASSIUM CHLORIDE 50; 2.25; 2.24 G/1000ML; G/1000ML; G/1000ML
1000 INJECTION, SOLUTION INTRAVENOUS
Refills: 0 | Status: DISCONTINUED | OUTPATIENT
Start: 2025-02-06 | End: 2025-02-10

## 2025-02-06 RX ADMIN — SODIUM CHLORIDE 1000 MILLILITER(S): 9 INJECTION, SOLUTION INTRAVENOUS at 12:17

## 2025-02-06 RX ADMIN — Medication 5000 UNIT(S): at 06:53

## 2025-02-06 RX ADMIN — HYDROMORPHONE HYDROCHLORIDE 0.5 MILLIGRAM(S): 4 INJECTION, SOLUTION INTRAMUSCULAR; INTRAVENOUS; SUBCUTANEOUS at 21:10

## 2025-02-06 RX ADMIN — ONDANSETRON 4 MILLIGRAM(S): 4 TABLET, ORALLY DISINTEGRATING ORAL at 00:19

## 2025-02-06 RX ADMIN — MORPHINE SULFATE 4 MILLIGRAM(S): 60 TABLET, FILM COATED, EXTENDED RELEASE ORAL at 00:21

## 2025-02-06 RX ADMIN — ACETAMINOPHEN 400 MILLIGRAM(S): 160 SUSPENSION ORAL at 00:50

## 2025-02-06 RX ADMIN — Medication 5000 UNIT(S): at 16:22

## 2025-02-06 RX ADMIN — MORPHINE SULFATE 4 MILLIGRAM(S): 60 TABLET, FILM COATED, EXTENDED RELEASE ORAL at 05:19

## 2025-02-06 RX ADMIN — Medication 1000 MILLILITER(S): at 00:19

## 2025-02-06 RX ADMIN — Medication 5000 UNIT(S): at 21:10

## 2025-02-06 RX ADMIN — HYDROMORPHONE HYDROCHLORIDE 0.5 MILLIGRAM(S): 4 INJECTION, SOLUTION INTRAMUSCULAR; INTRAVENOUS; SUBCUTANEOUS at 21:40

## 2025-02-06 RX ADMIN — HYDROMORPHONE HYDROCHLORIDE 0.5 MILLIGRAM(S): 4 INJECTION, SOLUTION INTRAMUSCULAR; INTRAVENOUS; SUBCUTANEOUS at 08:08

## 2025-02-06 RX ADMIN — DEXTROSE MONOHYDRATE, SODIUM CHLORIDE, AND POTASSIUM CHLORIDE 100 MILLILITER(S): 50; 2.25; 2.24 INJECTION, SOLUTION INTRAVENOUS at 08:11

## 2025-02-06 RX ADMIN — SODIUM CHLORIDE 500 MILLILITER(S): 9 INJECTION, SOLUTION INTRAVENOUS at 05:53

## 2025-02-06 NOTE — ED PROVIDER NOTE - CLINICAL SUMMARY MEDICAL DECISION MAKING FREE TEXT BOX
74-year-old female with lower abdominal pain.  Will  treat pain, obtain labs and imaging to further evaluate.

## 2025-02-06 NOTE — H&P ADULT - ASSESSMENT
74-year-old female w PMHx of Emphysema, Breast Ca s/p mastectomy, Melanoma, chronic back pain, Essential Tremors PSHx of  ex lap for Ovarian tumor resection, complaining of right lower quadrant abdominal pain for the past 1 evening.  Patient also reports some nausea.  Last bowel movement was earlier in the day.  Denies any fever.  No dysuria.  No back pain.    CT w c/f SBO      Plan:  Admit to Surgery  Conservative mng  NPO, m fluids, bowel rest  return of bowel function  pain nausea control PRN   Serial abdominal exam  Repeat XR today to eval progression of PO contrast

## 2025-02-06 NOTE — H&P ADULT - NSHPLABSRESULTS_GEN_ALL_CORE
02-06 @ 00:03                    14.2  CBC: 7.37>)-------(<269                     43.3                 139 | 108 | 21    CMP:  ----------------------< 121               5.1 | 27 | 1.10                      Ca:10.2  Phos:-  Mg:-               0.5|      |31        LFTs:  ------|99|-----             -|      |-      Current Inpatient Medications:  acetaminophen   IVPB .. 1000 milliGRAM(s) IV Intermittent every 6 hours PRN  acetaminophen   IVPB .. 1000 milliGRAM(s) IV Intermittent every 6 hours PRN  dextrose 5% + sodium chloride 0.9% with potassium chloride 20 mEq/L 1000 milliLiter(s) (100 mL/Hr) IV Continuous <Continuous>  heparin   Injectable 5000 Unit(s) SubCutaneous every 8 hours  HYDROmorphone  Injectable 0.5 milliGRAM(s) IV Push every 4 hours PRN  ondansetron Injectable 4 milliGRAM(s) IV Push every 8 hours PRN      < from: CT Abdomen and Pelvis w/ Oral Cont and w/ IV Cont (02.06.25 @ 03:15) >      PROCEDURE:  CT of the Abdomen and Pelvis was performed.  Sagittal and coronal reformats were performed.    FINDINGS:  LOWER CHEST: Coronary calcifications. Bilateral breast implants.    LIVER: Within normal limits.  BILE DUCTS: Normal caliber.  GALLBLADDER: Within normal limits.  SPLEEN: Within normal limits.  PANCREAS: Within normal limits.  ADRENALS: Within normal limits.  KIDNEYS/URETERS: Within normal limits.    BLADDER: Within normal limits.  REPRODUCTIVE ORGANS: Hysterectomy.    BOWEL: Streak artifact from multiple surgical clips throughout the   abdomen and pelvis limits evaluation. Dilated fluid-filled small bowel   loops within the anterior pelvis with transition point in the right lower   quadrant (2:84, 601:52). Small bowel loops distal to the transition point   are fluid-filled but normal caliber. Single mildly dilated smallbowel   loop in the lateral right lower quadrant (2:83, 602:26) with proximal and   distal transition points in close proximity to each other (2:88, 2:84).   Stomach is distended. Appendix is absent.  PERITONEUM/RETROPERITONEUM: Mild abdominopelvic ascites.  VESSELS: Atherosclerotic changes.  LYMPH NODES: No lymphadenopathy.  ABDOMINAL WALL: Within normal limits.  BONES: Degenerative changes.    IMPRESSION:  Small bowel obstruction with transition point in the right lower   quadrant. Additional single mildly dilated small bowel loop in the   lateral right lower quadrant, as above, for which early closed loop   obstruction not excluded.      < end of copied text >

## 2025-02-06 NOTE — H&P ADULT - NSHPPHYSICALEXAM_GEN_ALL_CORE
Vitals:  T(C): 37 (02-06 @ 05:25), Max: 37 (02-06 @ 05:25)  HR: 64 (02-06 @ 08:02) (62 - 67)  BP: 90/49 (02-06 @ 08:02) (85/50 - 161/81)  RR: 18 (02-06 @ 05:25) (18 - 18)  SpO2: 99% (02-06 @ 05:25) (99% - 100%)      Physical Exam:  General: AAOx3, NAD  Heart: RRR  Res: RA  Abdomen: Soft, ND, TTP mild to moderately RLQ, no R/G  Neuro/Psych: No localized deficits.   Skin: Normal, no rashes, no lesions noted.   Extremities: Warm, well perfused, no edema, Pulses intact
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
## 2025-02-06 NOTE — ED ADULT NURSE REASSESSMENT NOTE - NS ED NURSE REASSESS COMMENT FT1
Pt A/Ox4, finished receiving LR bolus. BP rechecked, see chart for Vital signs. C/o pain, medicated with prn pain medication. NPO at this time. IV fluids started. Discussed plan of care with pt, verbalized understanding.

## 2025-02-06 NOTE — ED PROVIDER NOTE - PHYSICAL EXAMINATION
***GEN - NAD; uncomfortable appearing; A+O x3 ***HEAD - NC/AT ***EYES/NOSE - PERRL, EOMI, mucous membranes moist, no discharge ***THROAT: Oral cavity and pharynx normal. No inflammation, swelling, exudate, or lesions.  ***NECK: Neck supple, non-tender  ***PULMONARY - CTA b/l, symmetric breath sounds. ***CARDIAC -s1s2, RRR, no M,G,R  ***ABDOMEN - +BS, ND, +ttp in right lq, soft, no guarding  ***BACK - no CVA tenderness, Normal  spine ***EXTREMITIES - symmetric pulses, 2+ dp, capillary refill < 2 seconds ***SKIN - no rash or bruising   ***NEUROLOGIC - alert, CN 2-12 intact

## 2025-02-06 NOTE — H&P ADULT - HISTORY OF PRESENT ILLNESS
74-year-old female w PMHx of Emphysema, Breast Ca s/p mastectomy, Melanoma, chronic back pain, Essential Tremors PSHx of  ex lap for Ovarian tumor resection, complaining of right lower quadrant abdominal pain for the past 1 evening.  Patient also reports some nausea.  Last bowel movement was earlier in the day.  Denies any fever.  No dysuria.  No back pain.

## 2025-02-06 NOTE — ED PROVIDER NOTE - OBJECTIVE STATEMENT
74-year-old female complaining of right lower quadrant abdominal pain for the past 1 evening.  Patient also reports some nausea.  Last bowel movement was earlier in the day.  Denies any fever.  No dysuria.  No back pain.

## 2025-02-06 NOTE — PATIENT PROFILE ADULT - FALL HARM RISK - HARM RISK INTERVENTIONS
Assistance with ambulation/Assistance OOB with selected safe patient handling equipment/Communicate Risk of Fall with Harm to all staff/Discuss with provider need for PT consult/Monitor gait and stability/Reinforce activity limits and safety measures with patient and family/Tailored Fall Risk Interventions/Visual Cue: Yellow wristband and red socks/Bed in lowest position, wheels locked, appropriate side rails in place/Call bell, personal items and telephone in reach/Instruct patient to call for assistance before getting out of bed or chair/Non-slip footwear when patient is out of bed/[place] to call system/Physically safe environment - no spills, clutter or unnecessary equipment/Purposeful Proactive Rounding/Room/bathroom lighting operational, light cord in reach Assistance with ambulation/Assistance OOB with selected safe patient handling equipment/Communicate Risk of Fall with Harm to all staff/Reinforce activity limits and safety measures with patient and family/Tailored Fall Risk Interventions/Use of alarms - bed, chair and/or voice tab/Visual Cue: Yellow wristband and red socks/Bed in lowest position, wheels locked, appropriate side rails in place/Call bell, personal items and telephone in reach/Instruct patient to call for assistance before getting out of bed or chair/Non-slip footwear when patient is out of bed/[place] to call system/Physically safe environment - no spills, clutter or unnecessary equipment/Purposeful Proactive Rounding/Room/bathroom lighting operational, light cord in reach

## 2025-02-06 NOTE — ED ADULT NURSE REASSESSMENT NOTE - NS ED NURSE REASSESS COMMENT FT1
Pt recevied from PHIL Ervin at 0530. Pt in NAD, A+Ox4. Pt is asymptomatically hypotensive but otherwise VSS. Surgical Resident is aware of pt's blood pressure. See orders for IV fluid rehydration. Pt endorses abdominal pain; medicated IV with morphine. Pt and family updated on current plan of care. Awaiting bed assignment to inpatient unit and further orders. Care continues as ordered. Denies current n/v, sob, CP. Fall and safety precautions maintained. Call bell within reach. All questions answered.

## 2025-02-06 NOTE — PATIENT PROFILE ADULT - FALL HARM RISK - FALL HARM RISK
Call regarding recent hospitalization.   At time of outreach call the patient feels as if their condition has improved. Patient states he is doing a lot better.  Patient has not yet had follow up with PCP or cardiology but is scheduled to see both next week. Pt denies any questions or concerns at this time.   
Other

## 2025-02-06 NOTE — PATIENT PROFILE ADULT - FUNCTIONAL ASSESSMENT - BASIC MOBILITY 6.
2-calculated by average/Not able to assess (calculate score using [facility] averaging method)  4-calculated by average/Not able to assess (calculate score using [facility] averaging method)

## 2025-02-06 NOTE — ED ADULT NURSE NOTE - OBJECTIVE STATEMENT
pt ambulatory to ED AO x 4 c/o RLQ abd pain x2 hours pta. +nausea.  pmh breast CA, ovary removal, appendix removal. pt states pain radiates down right leg and pain increases on palpation. Respirations unlabored.

## 2025-02-06 NOTE — ED ADULT NURSE NOTE - DOES PATIENT HAVE ADVANCE DIRECTIVE
Patient is here today for hard, sensitive bumps on both sides of labia. Says that she has a hernia in abd, has a lot of pain and is rock hard. Also thinks that she is going thru menopause, getting hot flashes, irritability, anxiety going thru the roof, has no libido. Has a \"mask\" going on in her face, doesn't know who to go to for this.  Her last pap showed: Neg/Neg 04/25/2017    Current form of birth control IUD and is satisfied.  Latex:  Patient reports allergy to latex.  Medications reviewed with patient.  Tobacco use verified.  Allergies verified.    Patient would like communication of their results via:     [facility]     Patient's current [facility] status: Active.        No

## 2025-02-06 NOTE — PROGRESS NOTE ADULT - SUBJECTIVE AND OBJECTIVE BOX
Repeat AXR reviewed by Dr. Villanueva  pt will with pain but not distended, and does admit to passing gas this morning after the CT scan was completed. -N/V.   CLD started   will continue to follow and see if pt tolerates  Repeat AXR reviewed by Dr. Villanueva  pt still c/o pain but not distended, and does admit to passing gas this morning after the CT scan was completed. -N/V.   CLD started   will continue to follow and see if pt tolerates

## 2025-02-06 NOTE — ED PROVIDER NOTE - IV ALTEPLASE EXCL REL HIDDEN
Physical Therapy Visit    Visit Type: Daily Treatment Note  Visit: 7  Referring Provider: Cullen Traylor MD  Medical Diagnosis (from order): Z98.890 - Status post rotator cuff repair     SUBJECTIVE                                                                                                                 Per Dr Traylor's specific orders:    PROM 4/1 to 4/15  AAROM 4/15 to 4/29  AROM 4/29  Strengthening 5/13      4/15/24:   -Pt had follow up  with Dr Traylor today.    -ok to start AAROM.            Pain / Symptoms  - Patient denies pain / symptoms.      OBJECTIVE                                                                                                                                     Treatment     Therapeutic Exercise  Elbow flexion / extn  Pendulums    AAROM *(NEW HEP)*   -press  -OH flexion 2 ways  -table slides flexion  -table slides ER        ASSESSMENT                                                                                                            Progressing nicely.    PROM flexion 135 deg              ER 70 deg              Abd 90 deg   Initiated AAROM without difficulty.  Seems to impinge at 135 deg flexion and discussed prolonged stretches.     Initiate post capsule and reach behind back next session when she returns from vacation.      PLAN                                                                                                                           Suggestions for next session as indicated: Progress per plan of care       Therapy procedure time and total treatment time can be found documented on the Time Entry flowsheet     show

## 2025-02-07 LAB
ANION GAP SERPL CALC-SCNC: 3 MMOL/L — LOW (ref 5–17)
ANION GAP SERPL CALC-SCNC: 3 MMOL/L — LOW (ref 5–17)
BUN SERPL-MCNC: 10 MG/DL — SIGNIFICANT CHANGE UP (ref 7–23)
BUN SERPL-MCNC: 12 MG/DL — SIGNIFICANT CHANGE UP (ref 7–23)
CALCIUM SERPL-MCNC: 7.9 MG/DL — LOW (ref 8.5–10.1)
CALCIUM SERPL-MCNC: 8.4 MG/DL — LOW (ref 8.5–10.1)
CHLORIDE SERPL-SCNC: 113 MMOL/L — HIGH (ref 96–108)
CHLORIDE SERPL-SCNC: 114 MMOL/L — HIGH (ref 96–108)
CO2 SERPL-SCNC: 20 MMOL/L — LOW (ref 22–31)
CO2 SERPL-SCNC: 23 MMOL/L — SIGNIFICANT CHANGE UP (ref 22–31)
CREAT SERPL-MCNC: 0.61 MG/DL — SIGNIFICANT CHANGE UP (ref 0.5–1.3)
CREAT SERPL-MCNC: 1.02 MG/DL — SIGNIFICANT CHANGE UP (ref 0.5–1.3)
EGFR: 58 ML/MIN/1.73M2 — LOW
EGFR: 94 ML/MIN/1.73M2 — SIGNIFICANT CHANGE UP
GLUCOSE SERPL-MCNC: 121 MG/DL — HIGH (ref 70–99)
GLUCOSE SERPL-MCNC: 158 MG/DL — HIGH (ref 70–99)
HCT VFR BLD CALC: 40.9 % — SIGNIFICANT CHANGE UP (ref 34.5–45)
HCT VFR BLD CALC: 45.8 % — HIGH (ref 34.5–45)
HGB BLD-MCNC: 13 G/DL — SIGNIFICANT CHANGE UP (ref 11.5–15.5)
HGB BLD-MCNC: 14.8 G/DL — SIGNIFICANT CHANGE UP (ref 11.5–15.5)
LACTATE SERPL-SCNC: 0.8 MMOL/L — SIGNIFICANT CHANGE UP (ref 0.7–2)
MCHC RBC-ENTMCNC: 30.3 PG — SIGNIFICANT CHANGE UP (ref 27–34)
MCHC RBC-ENTMCNC: 30.5 PG — SIGNIFICANT CHANGE UP (ref 27–34)
MCHC RBC-ENTMCNC: 31.8 G/DL — LOW (ref 32–36)
MCHC RBC-ENTMCNC: 32.3 G/DL — SIGNIFICANT CHANGE UP (ref 32–36)
MCV RBC AUTO: 94.2 FL — SIGNIFICANT CHANGE UP (ref 80–100)
MCV RBC AUTO: 95.3 FL — SIGNIFICANT CHANGE UP (ref 80–100)
PLATELET # BLD AUTO: 170 K/UL — SIGNIFICANT CHANGE UP (ref 150–400)
PLATELET # BLD AUTO: 241 K/UL — SIGNIFICANT CHANGE UP (ref 150–400)
POTASSIUM SERPL-MCNC: 4.2 MMOL/L — SIGNIFICANT CHANGE UP (ref 3.5–5.3)
POTASSIUM SERPL-MCNC: 4.4 MMOL/L — SIGNIFICANT CHANGE UP (ref 3.5–5.3)
POTASSIUM SERPL-SCNC: 4.2 MMOL/L — SIGNIFICANT CHANGE UP (ref 3.5–5.3)
POTASSIUM SERPL-SCNC: 4.4 MMOL/L — SIGNIFICANT CHANGE UP (ref 3.5–5.3)
RBC # BLD: 4.29 M/UL — SIGNIFICANT CHANGE UP (ref 3.8–5.2)
RBC # BLD: 4.86 M/UL — SIGNIFICANT CHANGE UP (ref 3.8–5.2)
RBC # FLD: 13.7 % — SIGNIFICANT CHANGE UP (ref 10.3–14.5)
RBC # FLD: 13.9 % — SIGNIFICANT CHANGE UP (ref 10.3–14.5)
SODIUM SERPL-SCNC: 137 MMOL/L — SIGNIFICANT CHANGE UP (ref 135–145)
SODIUM SERPL-SCNC: 139 MMOL/L — SIGNIFICANT CHANGE UP (ref 135–145)
WBC # BLD: 10.07 K/UL — SIGNIFICANT CHANGE UP (ref 3.8–10.5)
WBC # BLD: 10.46 K/UL — SIGNIFICANT CHANGE UP (ref 3.8–10.5)
WBC # FLD AUTO: 10.07 K/UL — SIGNIFICANT CHANGE UP (ref 3.8–10.5)
WBC # FLD AUTO: 10.46 K/UL — SIGNIFICANT CHANGE UP (ref 3.8–10.5)

## 2025-02-07 PROCEDURE — 71045 X-RAY EXAM CHEST 1 VIEW: CPT | Mod: 26

## 2025-02-07 PROCEDURE — 74019 RADEX ABDOMEN 2 VIEWS: CPT | Mod: 26

## 2025-02-07 PROCEDURE — 74177 CT ABD & PELVIS W/CONTRAST: CPT | Mod: 26

## 2025-02-07 PROCEDURE — 88305 TISSUE EXAM BY PATHOLOGIST: CPT | Mod: 26

## 2025-02-07 RX ORDER — LIDOCAINE HYDROCHLORIDE 30 MG/G
1 CREAM TOPICAL DAILY
Refills: 0 | Status: DISCONTINUED | OUTPATIENT
Start: 2025-02-07 | End: 2025-02-10

## 2025-02-07 RX ORDER — BUPIVACAINE 13.3 MG/ML
20 INJECTION, SUSPENSION, LIPOSOMAL INFILTRATION ONCE
Refills: 0 | Status: DISCONTINUED | OUTPATIENT
Start: 2025-02-07 | End: 2025-02-10

## 2025-02-07 RX ORDER — SODIUM CHLORIDE 9 G/ML
1000 INJECTION, SOLUTION INTRAVENOUS
Refills: 0 | Status: DISCONTINUED | OUTPATIENT
Start: 2025-02-07 | End: 2025-02-07

## 2025-02-07 RX ORDER — SODIUM CHLORIDE 9 G/ML
1000 INJECTION, SOLUTION INTRAVENOUS ONCE
Refills: 0 | Status: COMPLETED | OUTPATIENT
Start: 2025-02-07 | End: 2025-02-07

## 2025-02-07 RX ORDER — HYDROMORPHONE HYDROCHLORIDE 4 MG/ML
0.5 INJECTION, SOLUTION INTRAMUSCULAR; INTRAVENOUS; SUBCUTANEOUS
Refills: 0 | Status: DISCONTINUED | OUTPATIENT
Start: 2025-02-07 | End: 2025-02-07

## 2025-02-07 RX ORDER — PHENOL 1.4 %
1 AEROSOL, SPRAY (ML) MUCOUS MEMBRANE THREE TIMES A DAY
Refills: 0 | Status: DISCONTINUED | OUTPATIENT
Start: 2025-02-07 | End: 2025-02-10

## 2025-02-07 RX ORDER — ANTISEPTIC SURGICAL SCRUB 0.04 MG/ML
1 SOLUTION TOPICAL
Refills: 0 | Status: DISCONTINUED | OUTPATIENT
Start: 2025-02-07 | End: 2025-02-10

## 2025-02-07 RX ORDER — HYDROMORPHONE HYDROCHLORIDE 4 MG/ML
0.5 INJECTION, SOLUTION INTRAMUSCULAR; INTRAVENOUS; SUBCUTANEOUS ONCE
Refills: 0 | Status: DISCONTINUED | OUTPATIENT
Start: 2025-02-07 | End: 2025-02-07

## 2025-02-07 RX ORDER — ONDANSETRON 4 MG/1
4 TABLET, ORALLY DISINTEGRATING ORAL ONCE
Refills: 0 | Status: DISCONTINUED | OUTPATIENT
Start: 2025-02-07 | End: 2025-02-07

## 2025-02-07 RX ORDER — FENTANYL CITRATE 50 UG/ML
50 INJECTION INTRAMUSCULAR; INTRAVENOUS
Refills: 0 | Status: DISCONTINUED | OUTPATIENT
Start: 2025-02-07 | End: 2025-02-07

## 2025-02-07 RX ADMIN — DEXTROSE MONOHYDRATE, SODIUM CHLORIDE, AND POTASSIUM CHLORIDE 100 MILLILITER(S): 50; 2.25; 2.24 INJECTION, SOLUTION INTRAVENOUS at 22:30

## 2025-02-07 RX ADMIN — HYDROMORPHONE HYDROCHLORIDE 0.5 MILLIGRAM(S): 4 INJECTION, SOLUTION INTRAMUSCULAR; INTRAVENOUS; SUBCUTANEOUS at 06:11

## 2025-02-07 RX ADMIN — Medication 5000 UNIT(S): at 22:36

## 2025-02-07 RX ADMIN — FENTANYL CITRATE 50 MICROGRAM(S): 50 INJECTION INTRAMUSCULAR; INTRAVENOUS at 19:12

## 2025-02-07 RX ADMIN — HYDROMORPHONE HYDROCHLORIDE 0.5 MILLIGRAM(S): 4 INJECTION, SOLUTION INTRAMUSCULAR; INTRAVENOUS; SUBCUTANEOUS at 13:15

## 2025-02-07 RX ADMIN — ONDANSETRON 4 MILLIGRAM(S): 4 TABLET, ORALLY DISINTEGRATING ORAL at 10:10

## 2025-02-07 RX ADMIN — ONDANSETRON 4 MILLIGRAM(S): 4 TABLET, ORALLY DISINTEGRATING ORAL at 01:08

## 2025-02-07 RX ADMIN — HYDROMORPHONE HYDROCHLORIDE 0.5 MILLIGRAM(S): 4 INJECTION, SOLUTION INTRAMUSCULAR; INTRAVENOUS; SUBCUTANEOUS at 11:00

## 2025-02-07 RX ADMIN — SODIUM CHLORIDE 1000 MILLILITER(S): 9 INJECTION, SOLUTION INTRAVENOUS at 10:13

## 2025-02-07 RX ADMIN — HYDROMORPHONE HYDROCHLORIDE 0.5 MILLIGRAM(S): 4 INJECTION, SOLUTION INTRAMUSCULAR; INTRAVENOUS; SUBCUTANEOUS at 10:09

## 2025-02-07 RX ADMIN — DEXTROSE MONOHYDRATE, SODIUM CHLORIDE, AND POTASSIUM CHLORIDE 100 MILLILITER(S): 50; 2.25; 2.24 INJECTION, SOLUTION INTRAVENOUS at 00:56

## 2025-02-07 RX ADMIN — SODIUM CHLORIDE 100 MILLILITER(S): 9 INJECTION, SOLUTION INTRAVENOUS at 19:12

## 2025-02-07 RX ADMIN — SODIUM CHLORIDE 75 MILLILITER(S): 9 INJECTION, SOLUTION INTRAVENOUS at 15:34

## 2025-02-07 RX ADMIN — ACETAMINOPHEN 1000 MILLIGRAM(S): 160 SUSPENSION ORAL at 23:00

## 2025-02-07 RX ADMIN — HYDROMORPHONE HYDROCHLORIDE 0.5 MILLIGRAM(S): 4 INJECTION, SOLUTION INTRAMUSCULAR; INTRAVENOUS; SUBCUTANEOUS at 15:39

## 2025-02-07 RX ADMIN — ACETAMINOPHEN 400 MILLIGRAM(S): 160 SUSPENSION ORAL at 22:30

## 2025-02-07 RX ADMIN — Medication 5000 UNIT(S): at 05:14

## 2025-02-07 RX ADMIN — FENTANYL CITRATE 50 MICROGRAM(S): 50 INJECTION INTRAMUSCULAR; INTRAVENOUS at 19:30

## 2025-02-07 RX ADMIN — HYDROMORPHONE HYDROCHLORIDE 0.5 MILLIGRAM(S): 4 INJECTION, SOLUTION INTRAMUSCULAR; INTRAVENOUS; SUBCUTANEOUS at 01:32

## 2025-02-07 RX ADMIN — LIDOCAINE HYDROCHLORIDE 1 PATCH: 30 CREAM TOPICAL at 23:18

## 2025-02-07 RX ADMIN — LIDOCAINE HYDROCHLORIDE 1 PATCH: 30 CREAM TOPICAL at 12:17

## 2025-02-07 NOTE — CONSULT NOTE ADULT - SUBJECTIVE AND OBJECTIVE BOX
Brief Hospital Course:   Patient is a 74-year-old female w PMHx of COPD Emphysema (Not on home O2), Breast malignancy s/p mastectomy, Melanoma, chronic back pain, Essential Tremors PSHx of  ex lap for Ovarian tumor resection presented to ED on 2/6 with abdominal pain, nausea, and vomiting. Found with SBO, underwent laproscopic lysis of adhesions on 2/7. Critical care consulted following extubation with concern for oxygen saturations dropping to mid 80%, placed on Ventimask. Admitted to SICU by surgical team for post op management.     Patient evaluated at bedside after being brought up from PACU, in no acute distress. Patient saturating at 98% on Ventimask, transitioned to NC at 3LPM with saturations >92%. Endorsing sore throat, NGT seen coiled in mouth on CXR with no drainage from NGT. Tube adjusted correctly, with proper drainage and relief of discomfort reported by patient. Denies shortness of breath, chest pain, or dizziness.       PAST MEDICAL & SURGICAL HISTORY:  Emphysema/COPD  History of right mastectomy  S/P breast reconstruction  S/P CATALINO-BSO  History of ear surgery  Melanoma  S/P cataract surgery      Allergies  No Known Allergies    Intolerances      FAMILY HISTORY:      Review of Systems:  All ROS negative except included in HPI.      Medications:        acetaminophen   IVPB .. 1000 milliGRAM(s) IV Intermittent every 6 hours PRN  acetaminophen   IVPB .. 1000 milliGRAM(s) IV Intermittent every 6 hours PRN  HYDROmorphone  Injectable 0.5 milliGRAM(s) IV Push every 4 hours PRN  ondansetron Injectable 4 milliGRAM(s) IV Push every 8 hours PRN      heparin   Injectable 5000 Unit(s) SubCutaneous every 8 hours          dextrose 5% + sodium chloride 0.9% with potassium chloride 20 mEq/L 1000 milliLiter(s) IV Continuous <Continuous>      benzocaine/menthol Lozenge 1 Lozenge Oral three times a day PRN  chlorhexidine 2% Cloths 1 Application(s) Topical <User Schedule>  lidocaine   4% Patch 1 Patch Transdermal daily    BUpivacaine liposome 1.3% Injectable 20 milliLiter(s) Local Injection once          ICU Vital Signs Last 24 Hrs  T(C): 36.7 (07 Feb 2025 21:30), Max: 36.8 (07 Feb 2025 01:25)  T(F): 98.1 (07 Feb 2025 21:30), Max: 98.3 (07 Feb 2025 01:25)  HR: 75 (07 Feb 2025 21:29) (66 - 112)  BP: 104/55 (07 Feb 2025 21:29) (96/55 - 186/109)  BP(mean): 68 (07 Feb 2025 21:29) (68 - 68)  ABP: --  ABP(mean): --  RR: 12 (07 Feb 2025 21:29) (10 - 19)  SpO2: 97% (07 Feb 2025 21:29) (81% - 100%)    O2 Parameters below as of 07 Feb 2025 21:29  Patient On (Oxygen Delivery Method): mask, Venturi          Vital Signs Last 24 Hrs  T(C): 36.7 (07 Feb 2025 21:30), Max: 36.8 (07 Feb 2025 01:25)  T(F): 98.1 (07 Feb 2025 21:30), Max: 98.3 (07 Feb 2025 01:25)  HR: 75 (07 Feb 2025 21:29) (66 - 112)  BP: 104/55 (07 Feb 2025 21:29) (96/55 - 186/109)  BP(mean): 68 (07 Feb 2025 21:29) (68 - 68)  RR: 12 (07 Feb 2025 21:29) (10 - 19)  SpO2: 97% (07 Feb 2025 21:29) (81% - 100%)    Parameters below as of 07 Feb 2025 21:29  Patient On (Oxygen Delivery Method): mask, Venturi            I&O's Detail    07 Feb 2025 07:01  -  07 Feb 2025 22:34  --------------------------------------------------------  IN:    Lactated Ringers: 300 mL    Other (mL): 1300 mL  Total IN: 1600 mL    OUT:  Total OUT: 0 mL    Total NET: 1600 mL            LABS:                        14.8   10.46 )-----------( 241      ( 07 Feb 2025 08:11 )             45.8     02-07    139  |  113[H]  |  10  ----------------------------<  158[H]  4.2   |  23  |  0.61    Ca    8.4[L]      07 Feb 2025 08:11    TPro  7.1  /  Alb  3.6  /  TBili  0.5  /  DBili  x   /  AST  31  /  ALT  31  /  AlkPhos  99  02-06          CAPILLARY BLOOD GLUCOSE        PT/INR - ( 06 Feb 2025 00:03 )   PT: 11.2 sec;   INR: 0.97 ratio         PTT - ( 06 Feb 2025 00:03 )  PTT:37.0 sec  Urinalysis Basic - ( 07 Feb 2025 08:11 )    Color: x / Appearance: x / SG: x / pH: x  Gluc: 158 mg/dL / Ketone: x  / Bili: x / Urobili: x   Blood: x / Protein: x / Nitrite: x   Leuk Esterase: x / RBC: x / WBC x   Sq Epi: x / Non Sq Epi: x / Bacteria: x      CULTURES:  Culture Results:   No growth at 24 hours (02-06 @ 01:00)  Culture Results:   No growth at 24 hours (02-06 @ 01:00)      Physical Examination:    General:  Awake.  Alert, oriented, interactive, nonfocal    HEENT: Pupils equal, reactive to light.  Symmetric. No JVD     PULM: Clear to auscultation bilaterally, no significant sputum production. Negative cyanosis nor accessory muscle usage.     CVS: Regular rate and rhythm.     ABD: Soft, nondistended. Patient    EXT: No edema, nontender    SKIN: Warm and well perfused, no rashes noted.    RADIOLOGY: ***    CRITICAL CARE TIME SPENT: ***    (Assessing presenting problems of acute illness, which pose high probability of life threatening deterioration or end organ damage/dysfunction, as well as medical decision making including initiating plan of care, reviewing data, reviewing radiologic exams, discussing with multidisciplinary team,  discussing goals of care with patient/family, and writing this note.  Non-inclusive of procedures performed)      Date of entry of this note is equal to the date of services rendered. Brief Hospital Course:   Patient is a 74-year-old female w PMHx of COPD Emphysema (Not on home O2), Breast malignancy s/p mastectomy, Melanoma, chronic back pain, Essential Tremors PSHx of  ex lap for Ovarian tumor resection presented to ED on 2/6 with abdominal pain, nausea, and vomiting. Found with SBO, underwent laproscopic lysis of adhesions on 2/7. Critical care consulted following extubation with concern for oxygen saturations dropping to mid 80%, placed on Ventimask. Admitted to SICU by surgical team for post op management.     Patient evaluated at bedside after being brought up from PACU, in no acute distress. Patient saturating at 98% on Ventimask, transitioned to NC at 3LPM with saturations >92%. Endorsing sore throat, NGT seen coiled in mouth on CXR with no drainage from NGT. Tube adjusted correctly, with proper drainage and relief of discomfort reported by patient. Denies shortness of breath, chest pain, or dizziness.       PAST MEDICAL & SURGICAL HISTORY:  Emphysema/COPD  History of right mastectomy  S/P breast reconstruction  S/P CATALINO-BSO  History of ear surgery  Melanoma  S/P cataract surgery      Allergies  No Known Allergies    Intolerances      FAMILY HISTORY:      Review of Systems:  All ROS negative except included in HPI.      Medications:        acetaminophen   IVPB .. 1000 milliGRAM(s) IV Intermittent every 6 hours PRN  acetaminophen   IVPB .. 1000 milliGRAM(s) IV Intermittent every 6 hours PRN  HYDROmorphone  Injectable 0.5 milliGRAM(s) IV Push every 4 hours PRN  ondansetron Injectable 4 milliGRAM(s) IV Push every 8 hours PRN      heparin   Injectable 5000 Unit(s) SubCutaneous every 8 hours          dextrose 5% + sodium chloride 0.9% with potassium chloride 20 mEq/L 1000 milliLiter(s) IV Continuous <Continuous>      benzocaine/menthol Lozenge 1 Lozenge Oral three times a day PRN  chlorhexidine 2% Cloths 1 Application(s) Topical <User Schedule>  lidocaine   4% Patch 1 Patch Transdermal daily    BUpivacaine liposome 1.3% Injectable 20 milliLiter(s) Local Injection once          ICU Vital Signs Last 24 Hrs  T(C): 36.7 (07 Feb 2025 21:30), Max: 36.8 (07 Feb 2025 01:25)  T(F): 98.1 (07 Feb 2025 21:30), Max: 98.3 (07 Feb 2025 01:25)  HR: 75 (07 Feb 2025 21:29) (66 - 112)  BP: 104/55 (07 Feb 2025 21:29) (96/55 - 186/109)  BP(mean): 68 (07 Feb 2025 21:29) (68 - 68)  ABP: --  ABP(mean): --  RR: 12 (07 Feb 2025 21:29) (10 - 19)  SpO2: 97% (07 Feb 2025 21:29) (81% - 100%)    O2 Parameters below as of 07 Feb 2025 21:29  Patient On (Oxygen Delivery Method): mask, Venturi          Vital Signs Last 24 Hrs  T(C): 36.7 (07 Feb 2025 21:30), Max: 36.8 (07 Feb 2025 01:25)  T(F): 98.1 (07 Feb 2025 21:30), Max: 98.3 (07 Feb 2025 01:25)  HR: 75 (07 Feb 2025 21:29) (66 - 112)  BP: 104/55 (07 Feb 2025 21:29) (96/55 - 186/109)  BP(mean): 68 (07 Feb 2025 21:29) (68 - 68)  RR: 12 (07 Feb 2025 21:29) (10 - 19)  SpO2: 97% (07 Feb 2025 21:29) (81% - 100%)    Parameters below as of 07 Feb 2025 21:29  Patient On (Oxygen Delivery Method): mask, Venturi            I&O's Detail    07 Feb 2025 07:01  -  07 Feb 2025 22:34  --------------------------------------------------------  IN:    Lactated Ringers: 300 mL    Other (mL): 1300 mL  Total IN: 1600 mL    OUT:  Total OUT: 0 mL    Total NET: 1600 mL            LABS:                        14.8   10.46 )-----------( 241      ( 07 Feb 2025 08:11 )             45.8     02-07    139  |  113[H]  |  10  ----------------------------<  158[H]  4.2   |  23  |  0.61    Ca    8.4[L]      07 Feb 2025 08:11    TPro  7.1  /  Alb  3.6  /  TBili  0.5  /  DBili  x   /  AST  31  /  ALT  31  /  AlkPhos  99  02-06          CAPILLARY BLOOD GLUCOSE        PT/INR - ( 06 Feb 2025 00:03 )   PT: 11.2 sec;   INR: 0.97 ratio         PTT - ( 06 Feb 2025 00:03 )  PTT:37.0 sec  Urinalysis Basic - ( 07 Feb 2025 08:11 )    Color: x / Appearance: x / SG: x / pH: x  Gluc: 158 mg/dL / Ketone: x  / Bili: x / Urobili: x   Blood: x / Protein: x / Nitrite: x   Leuk Esterase: x / RBC: x / WBC x   Sq Epi: x / Non Sq Epi: x / Bacteria: x      CULTURES:  Culture Results:   No growth at 24 hours (02-06 @ 01:00)  Culture Results:   No growth at 24 hours (02-06 @ 01:00)      Physical Examination:    General:  Awake.  Alert, oriented, interactive, nonfocal    HEENT: Pupils equal, reactive to light.  Symmetric. No JVD     PULM: Clear to auscultation bilaterally, no significant sputum production. Negative cyanosis nor accessory muscle usage.     CVS: Regular rate and rhythm.     ABD: Soft, nondistended. Laparoscopic surgical sites clean, negative erythema/drainage/warmth.     EXT: No edema, nontender    SKIN: Warm and well perfused, no rashes noted.    RADIOLOGY:     Labs, Radiology, Cardiology, and Other Results: 02-06 @ 00:03                    14.2  CBC: 7.37>)-------(<269                     43.3                 139 | 108 | 21    CMP:  ----------------------< 121               5.1 | 27 | 1.10                      Ca:10.2  Phos:-  Mg:-               0.5|      |31        LFTs:  ------|99|-----             -|      |-      Current Inpatient Medications:  acetaminophen   IVPB .. 1000 milliGRAM(s) IV Intermittent every 6 hours PRN  acetaminophen   IVPB .. 1000 milliGRAM(s) IV Intermittent every 6 hours PRN  dextrose 5% + sodium chloride 0.9% with potassium chloride 20 mEq/L 1000 milliLiter(s) (100 mL/Hr) IV Continuous <Continuous>  heparin   Injectable 5000 Unit(s) SubCutaneous every 8 hours  HYDROmorphone  Injectable 0.5 milliGRAM(s) IV Push every 4 hours PRN  ondansetron Injectable 4 milliGRAM(s) IV Push every 8 hours PRN      < from: CT Abdomen and Pelvis w/ Oral Cont and w/ IV Cont (02.06.25 @ 03:15) >      PROCEDURE:  CT of the Abdomen and Pelvis was performed.  Sagittal and coronal reformats were performed.    FINDINGS:  LOWER CHEST: Coronary calcifications. Bilateral breast implants.    LIVER: Within normal limits.  BILE DUCTS: Normal caliber.  GALLBLADDER: Within normal limits.  SPLEEN: Within normal limits.  PANCREAS: Within normal limits.  ADRENALS: Within normal limits.  KIDNEYS/URETERS: Within normal limits.    BLADDER: Within normal limits.  REPRODUCTIVE ORGANS: Hysterectomy.    BOWEL: Streak artifact from multiple surgical clips throughout the   abdomen and pelvis limits evaluation. Dilated fluid-filled small bowel   loops within the anterior pelvis with transition point in the right lower   quadrant (2:84, 601:52). Small bowel loops distal to the transition point   are fluid-filled but normal caliber. Single mildly dilated smallbowel   loop in the lateral right lower quadrant (2:83, 602:26) with proximal and   distal transition points in close proximity to each other (2:88, 2:84).   Stomach is distended. Appendix is absent.  PERITONEUM/RETROPERITONEUM: Mild abdominopelvic ascites.  VESSELS: Atherosclerotic changes.  LYMPH NODES: No lymphadenopathy.  ABDOMINAL WALL: Within normal limits.  BONES: Degenerative changes.    IMPRESSION:  Small bowel obstruction with transition point in the right lower   quadrant. Additional single mildly dilated small bowel loop in the   lateral right lower quadrant, as above, for which early closed loop   obstruction not excluded.      < end of copied text >     Brief Hospital Course:   Patient is a 74-year-old female w PMHx of COPD Emphysema (Not on home O2), Breast malignancy s/p mastectomy, Melanoma, chronic back pain, Essential Tremors PSHx of  ex lap for Ovarian tumor resection presented to ED on 2/6 with abdominal pain, nausea, and vomiting. Found with SBO, underwent laproscopic lysis of adhesions on 2/7. Critical care consulted following extubation with concern for oxygen saturations dropping to mid 80%, placed on Ventimask. Admitted to SICU by surgical team for post op management.     Endorsing sore throat, NGT seen coiled in mouth on CXR with no drainage from NGT. Tube adjusted correctly, with proper drainage and relief of discomfort reported by patient. Denies shortness of breath, chest pain, or dizziness.       PAST MEDICAL & SURGICAL HISTORY:  Emphysema/COPD  History of right mastectomy  S/P breast reconstruction  S/P CATALINO-BSO  History of ear surgery  Melanoma  S/P cataract surgery      Allergies  No Known Allergies    Intolerances      FAMILY HISTORY:      Review of Systems:  All ROS negative except included in HPI.      Medications:        acetaminophen   IVPB .. 1000 milliGRAM(s) IV Intermittent every 6 hours PRN  acetaminophen   IVPB .. 1000 milliGRAM(s) IV Intermittent every 6 hours PRN  HYDROmorphone  Injectable 0.5 milliGRAM(s) IV Push every 4 hours PRN  ondansetron Injectable 4 milliGRAM(s) IV Push every 8 hours PRN      heparin   Injectable 5000 Unit(s) SubCutaneous every 8 hours          dextrose 5% + sodium chloride 0.9% with potassium chloride 20 mEq/L 1000 milliLiter(s) IV Continuous <Continuous>      benzocaine/menthol Lozenge 1 Lozenge Oral three times a day PRN  chlorhexidine 2% Cloths 1 Application(s) Topical <User Schedule>  lidocaine   4% Patch 1 Patch Transdermal daily    BUpivacaine liposome 1.3% Injectable 20 milliLiter(s) Local Injection once          ICU Vital Signs Last 24 Hrs  T(C): 36.7 (07 Feb 2025 21:30), Max: 36.8 (07 Feb 2025 01:25)  T(F): 98.1 (07 Feb 2025 21:30), Max: 98.3 (07 Feb 2025 01:25)  HR: 75 (07 Feb 2025 21:29) (66 - 112)  BP: 104/55 (07 Feb 2025 21:29) (96/55 - 186/109)  BP(mean): 68 (07 Feb 2025 21:29) (68 - 68)  ABP: --  ABP(mean): --  RR: 12 (07 Feb 2025 21:29) (10 - 19)  SpO2: 97% (07 Feb 2025 21:29) (81% - 100%)    O2 Parameters below as of 07 Feb 2025 21:29  Patient On (Oxygen Delivery Method): mask, Venturi          Vital Signs Last 24 Hrs  T(C): 36.7 (07 Feb 2025 21:30), Max: 36.8 (07 Feb 2025 01:25)  T(F): 98.1 (07 Feb 2025 21:30), Max: 98.3 (07 Feb 2025 01:25)  HR: 75 (07 Feb 2025 21:29) (66 - 112)  BP: 104/55 (07 Feb 2025 21:29) (96/55 - 186/109)  BP(mean): 68 (07 Feb 2025 21:29) (68 - 68)  RR: 12 (07 Feb 2025 21:29) (10 - 19)  SpO2: 97% (07 Feb 2025 21:29) (81% - 100%)    Parameters below as of 07 Feb 2025 21:29  Patient On (Oxygen Delivery Method): mask, Venturi            I&O's Detail    07 Feb 2025 07:01  -  07 Feb 2025 22:34  --------------------------------------------------------  IN:    Lactated Ringers: 300 mL    Other (mL): 1300 mL  Total IN: 1600 mL    OUT:  Total OUT: 0 mL    Total NET: 1600 mL            LABS:                        14.8   10.46 )-----------( 241      ( 07 Feb 2025 08:11 )             45.8     02-07    139  |  113[H]  |  10  ----------------------------<  158[H]  4.2   |  23  |  0.61    Ca    8.4[L]      07 Feb 2025 08:11    TPro  7.1  /  Alb  3.6  /  TBili  0.5  /  DBili  x   /  AST  31  /  ALT  31  /  AlkPhos  99  02-06          CAPILLARY BLOOD GLUCOSE        PT/INR - ( 06 Feb 2025 00:03 )   PT: 11.2 sec;   INR: 0.97 ratio         PTT - ( 06 Feb 2025 00:03 )  PTT:37.0 sec  Urinalysis Basic - ( 07 Feb 2025 08:11 )    Color: x / Appearance: x / SG: x / pH: x  Gluc: 158 mg/dL / Ketone: x  / Bili: x / Urobili: x   Blood: x / Protein: x / Nitrite: x   Leuk Esterase: x / RBC: x / WBC x   Sq Epi: x / Non Sq Epi: x / Bacteria: x      CULTURES:  Culture Results:   No growth at 24 hours (02-06 @ 01:00)  Culture Results:   No growth at 24 hours (02-06 @ 01:00)      Physical Examination:    General:  Awake.  Alert, oriented, interactive, nonfocal    HEENT: Pupils equal, reactive to light.  Symmetric. No JVD     PULM: Clear to auscultation bilaterally, no significant sputum production. Negative cyanosis nor accessory muscle usage.     CVS: Regular rate and rhythm.     ABD: Soft, nondistended. Laparoscopic surgical sites clean, negative erythema/drainage/warmth.     EXT: No edema, nontender    SKIN: Warm and well perfused, no rashes noted.    RADIOLOGY:     Labs, Radiology, Cardiology, and Other Results: 02-06 @ 00:03                    14.2  CBC: 7.37>)-------(<269                     43.3                 139 | 108 | 21    CMP:  ----------------------< 121               5.1 | 27 | 1.10                      Ca:10.2  Phos:-  Mg:-               0.5|      |31        LFTs:  ------|99|-----             -|      |-      Current Inpatient Medications:  acetaminophen   IVPB .. 1000 milliGRAM(s) IV Intermittent every 6 hours PRN  acetaminophen   IVPB .. 1000 milliGRAM(s) IV Intermittent every 6 hours PRN  dextrose 5% + sodium chloride 0.9% with potassium chloride 20 mEq/L 1000 milliLiter(s) (100 mL/Hr) IV Continuous <Continuous>  heparin   Injectable 5000 Unit(s) SubCutaneous every 8 hours  HYDROmorphone  Injectable 0.5 milliGRAM(s) IV Push every 4 hours PRN  ondansetron Injectable 4 milliGRAM(s) IV Push every 8 hours PRN      < from: CT Abdomen and Pelvis w/ Oral Cont and w/ IV Cont (02.06.25 @ 03:15) >      PROCEDURE:  CT of the Abdomen and Pelvis was performed.  Sagittal and coronal reformats were performed.    FINDINGS:  LOWER CHEST: Coronary calcifications. Bilateral breast implants.    LIVER: Within normal limits.  BILE DUCTS: Normal caliber.  GALLBLADDER: Within normal limits.  SPLEEN: Within normal limits.  PANCREAS: Within normal limits.  ADRENALS: Within normal limits.  KIDNEYS/URETERS: Within normal limits.    BLADDER: Within normal limits.  REPRODUCTIVE ORGANS: Hysterectomy.    BOWEL: Streak artifact from multiple surgical clips throughout the   abdomen and pelvis limits evaluation. Dilated fluid-filled small bowel   loops within the anterior pelvis with transition point in the right lower   quadrant (2:84, 601:52). Small bowel loops distal to the transition point   are fluid-filled but normal caliber. Single mildly dilated smallbowel   loop in the lateral right lower quadrant (2:83, 602:26) with proximal and   distal transition points in close proximity to each other (2:88, 2:84).   Stomach is distended. Appendix is absent.  PERITONEUM/RETROPERITONEUM: Mild abdominopelvic ascites.  VESSELS: Atherosclerotic changes.  LYMPH NODES: No lymphadenopathy.  ABDOMINAL WALL: Within normal limits.  BONES: Degenerative changes.    IMPRESSION:  Small bowel obstruction with transition point in the right lower   quadrant. Additional single mildly dilated small bowel loop in the   lateral right lower quadrant, as above, for which early closed loop   obstruction not excluded.      < end of copied text >

## 2025-02-07 NOTE — CHART NOTE - NSCHARTNOTEFT_GEN_A_CORE
Discussed w Dr. Villanueva- no need for hospital medicine consult at this time. Surgical team to call back if in need of hospital medicine consult/clearance.

## 2025-02-07 NOTE — CONSULT NOTE ADULT - ASSESSMENT
Assessment:   Patient is a 74-year-old female w PMHx of COPD Emphysema (Not on home O2), Breast malignancy s/p mastectomy, Melanoma, chronic back pain, Essential Tremors PSHx of  ex lap for Ovarian tumor resection presented to ED on 2/6 with abdominal pain, nausea, and vomiting. Found with SBO, underwent laproscopic lysis of adhesions on 2/7. Critical care consulted following extubation with concern for oxygen saturations dropping to mid 80%, placed on Ventimask. Admitted to SICU by surgical team for post op management.     1. SBO   2. COPD    Plan:   Neuro- Post op pain management with multimodal pain regiment.   Respiratory- Initially hypoxic following extubation with O2 sats reported to be in 80s, placed on Ventimask in PACU. Transitioned to NC in SICU with negative SOB. Will titrate to maintain SpO2 88-92% given context of COPD. C/w incentive spirometry. CXR in PACU with question of RLL haziness, likely 2/2 to overlying breast implant. Negative crackles/rhonchi on exam, repeat CXR in AM to evaluate for evidence of aspiration PNA.   Cardiovascular- Negative pressor requirements, maintain MAP >65.   GI- NPO, NGT to low continuous suction. Surgical team following s/p SBO w/ laparoscopic lysis of adhesions.   Renal- Monitor electrolytes and replete PRN.   Heme- SubQ Heparin for DVT prophylaxis.   ID- Afebrile, negative leukocytosis. No evidence of infection at this time.  Endo- Maintain -180 in critically ill.     CRITICAL CARE TIME SPENT: ***    (Assessing presenting problems of acute illness, which pose high probability of life threatening deterioration or end organ damage/dysfunction, as well as medical decision making including initiating plan of care, reviewing data, reviewing radiologic exams, discussing with multidisciplinary team,  discussing goals of care with patient/family, and writing this note.  Non-inclusive of procedures performed)      Date of entry of this note is equal to the date of services rendered. Assessment:   Patient is a 74-year-old female w PMHx of COPD Emphysema (Not on home O2), Breast malignancy s/p mastectomy, Melanoma, chronic back pain, Essential Tremors PSHx of  ex lap for Ovarian tumor resection presented to ED on 2/6 with abdominal pain, nausea, and vomiting. Found with SBO, underwent laproscopic lysis of adhesions on 2/7. Critical care consulted following extubation with concern for oxygen saturations dropping to mid 80%, placed on Ventimask. Admitted to SICU by surgical team for post op management.     1. SBO   2. COPD    Plan:   Neuro- Post op pain management with multimodal pain regiment.   Respiratory- Initially hypoxic following extubation with O2 sats reported to be in 80s, placed on Ventimask in PACU. Transitioned to NC in SICU with negative SOB. Will titrate to maintain SpO2 88-92% given context of COPD. C/w incentive spirometry.  Cardiovascular- Negative pressor requirements, maintain MAP >65.   GI- NPO, NGT to low continuous suction.    Surgical team following s/p SBO w/ laparoscopic lysis of adhesions.   Renal- Monitor electrolytes and replete PRN.   Heme- Sub Q Heparin for DVT prophylaxis.   ID- Afebrile, negative leukocytosis. No evidence of infection at this time.  Endo- Maintain -180 in critically ill.     TIME SPENT: 55 minutes     (Assessing presenting problems of acute illness, which pose high probability of life threatening deterioration or end organ damage/dysfunction, as well as medical decision making including initiating plan of care, reviewing data, reviewing radiologic exams, discussing with multidisciplinary team,  discussing goals of care with patient/family, and writing this note.  Non-inclusive of procedures performed)      Date of entry of this note is equal to the date of services rendered.

## 2025-02-07 NOTE — BRIEF OPERATIVE NOTE - NSICDXBRIEFPROCEDURE_GEN_ALL_CORE_FT
PROCEDURES:  Diagnostic laparoscopy 07-Feb-2025 19:00:57 lysis of adhesions, b/L TAP block Christina Vazquez

## 2025-02-07 NOTE — PROGRESS NOTE ADULT - SUBJECTIVE AND OBJECTIVE BOX
Pt seen and examined with  at bedside. Pt " does not feel well" ,+ RLQ Abd pain unchanged, + nausea. Pt vomited x 1 last PM,  + flatus , no BM . No CP, dyspnea.   Started on clears yesterday, only taking sips overnight.     Vital Signs Last 24 Hrs  T(C): 36.8 (07 Feb 2025 08:17), Max: 36.9 (06 Feb 2025 16:04)  T(F): 98.2 (07 Feb 2025 08:17), Max: 98.5 (06 Feb 2025 16:04)  HR: 81 (07 Feb 2025 10:09) (65 - 81)  BP: 134/77 (07 Feb 2025 10:09) (81/44 - 134/77)  BP(mean): 66 (06 Feb 2025 16:04) (56 - 74)  RR: 18 (07 Feb 2025 10:09) (17 - 18)  SpO2: 97% (07 Feb 2025 10:09) (92% - 100%)    Parameters below as of 07 Feb 2025 10:09  Patient On (Oxygen Delivery Method): nasal cannula  O2 Flow (L/min): 2    PE:    Gen: NAD    Heart: S1S2 RRR    Lungs: CTA B/L    Abdomen: + mild distension, + BS, soft, + tenderness RLW, no rebound or guarding    Extrem: No edema, NT                            14.8   10.46 )-----------( 241      ( 07 Feb 2025 08:11 )             45.8     02-07    139  |  113[H]  |  10  ----------------------------<  158[H]  4.2   |  23  |  0.61    Ca    8.4[L]      07 Feb 2025 08:11    TPro  7.1  /  Alb  3.6  /  TBili  0.5  /  DBili  x   /  AST  31  /  ALT  31  /  AlkPhos  99  02-06        LIVER FUNCTIONS - ( 06 Feb 2025 00:03 )  Alb: 3.6 g/dL / Pro: 7.1 gm/dL / ALK PHOS: 99 U/L / ALT: 31 U/L / AST: 31 U/L / GGT: x           PT/INR - ( 06 Feb 2025 00:03 )   PT: 11.2 sec;   INR: 0.97 ratio         PTT - ( 06 Feb 2025 00:03 )  PTT:37.0 sec    Urinalysis Basic - ( 07 Feb 2025 08:11 )    Color: x / Appearance: x / SG: x / pH: x  Gluc: 158 mg/dL / Ketone: x  / Bili: x / Urobili: x   Blood: x / Protein: x / Nitrite: x   Leuk Esterase: x / RBC: x / WBC x   Sq Epi: x / Non Sq Epi: x / Bacteria: x        Lactate, Blood: 0.8 mmol/L (02-07 @ 08:11)    Blood, Urine: Negative (02-06 @ 15:18)    Troponin Trend: Lactate, Blood: 0.8 mmol/L (02-07 @ 08:11)    A/P:  SBO  Pt did not tolerate clears  RLQ pain unchanged   NS bolus  repeat CT ordered, F/U results   Above as discussed with Dr Villanueva        A/P      Pt seen and examined with  at bedside. Pt " does not feel well" ,+ RLQ Abd pain unchanged, + nausea. Pt vomited x 1 last PM,  + flatus , no BM . No CP, dyspnea.   Started on clears yesterday, only taking sips overnight.     Vital Signs Last 24 Hrs  T(C): 36.8 (07 Feb 2025 08:17), Max: 36.9 (06 Feb 2025 16:04)  T(F): 98.2 (07 Feb 2025 08:17), Max: 98.5 (06 Feb 2025 16:04)  HR: 81 (07 Feb 2025 10:09) (65 - 81)  BP: 134/77 (07 Feb 2025 10:09) (81/44 - 134/77)  BP(mean): 66 (06 Feb 2025 16:04) (56 - 74)  RR: 18 (07 Feb 2025 10:09) (17 - 18)  SpO2: 97% (07 Feb 2025 10:09) (92% - 100%)    Parameters below as of 07 Feb 2025 10:09  Patient On (Oxygen Delivery Method): nasal cannula  O2 Flow (L/min): 2    PE:    Gen: NAD    Heart: S1S2 RRR    Lungs: CTA B/L    Abdomen: + mild distension, + BS, soft, + tenderness RLW, no rebound or guarding    Extrem: No edema, NT                            14.8   10.46 )-----------( 241      ( 07 Feb 2025 08:11 )             45.8     02-07    139  |  113[H]  |  10  ----------------------------<  158[H]  4.2   |  23  |  0.61    Ca    8.4[L]      07 Feb 2025 08:11    TPro  7.1  /  Alb  3.6  /  TBili  0.5  /  DBili  x   /  AST  31  /  ALT  31  /  AlkPhos  99  02-06        LIVER FUNCTIONS - ( 06 Feb 2025 00:03 )  Alb: 3.6 g/dL / Pro: 7.1 gm/dL / ALK PHOS: 99 U/L / ALT: 31 U/L / AST: 31 U/L / GGT: x           PT/INR - ( 06 Feb 2025 00:03 )   PT: 11.2 sec;   INR: 0.97 ratio         PTT - ( 06 Feb 2025 00:03 )  PTT:37.0 sec    Urinalysis Basic - ( 07 Feb 2025 08:11 )    Color: x / Appearance: x / SG: x / pH: x  Gluc: 158 mg/dL / Ketone: x  / Bili: x / Urobili: x   Blood: x / Protein: x / Nitrite: x   Leuk Esterase: x / RBC: x / WBC x   Sq Epi: x / Non Sq Epi: x / Bacteria: x        Lactate, Blood: 0.8 mmol/L (02-07 @ 08:11)    Blood, Urine: Negative (02-06 @ 15:18)    Troponin Trend: Lactate, Blood: 0.8 mmol/L (02-07 @ 08:11)    A/P:  SBO  Pt did not tolerate clears  RLQ pain unchanged   LR bolus  repeat CT ordered, F/U results   Above as discussed with Dr Villanueva              Pt seen and examined with  at bedside. Pt " does not feel well" ,+ RLQ Abd pain unchanged, + nausea. Pt vomited x 1 last PM,  + flatus , no BM . No CP, dyspnea.   Started on clears yesterday, only taking sips overnight.     Vital Signs Last 24 Hrs  T(C): 36.8 (07 Feb 2025 08:17), Max: 36.9 (06 Feb 2025 16:04)  T(F): 98.2 (07 Feb 2025 08:17), Max: 98.5 (06 Feb 2025 16:04)  HR: 81 (07 Feb 2025 10:09) (65 - 81)  BP: 134/77 (07 Feb 2025 10:09) (81/44 - 134/77)  BP(mean): 66 (06 Feb 2025 16:04) (56 - 74)  RR: 18 (07 Feb 2025 10:09) (17 - 18)  SpO2: 97% (07 Feb 2025 10:09) (92% - 100%)    Parameters below as of 07 Feb 2025 10:09  Patient On (Oxygen Delivery Method): nasal cannula  O2 Flow (L/min): 2    PE:    Gen: NAD    Heart: S1S2 RRR    Lungs: CTA B/L    Abdomen: + mild distension, + BS, soft, + tenderness RLQ, no rebound or guarding    Extrem: No edema, NT                            14.8   10.46 )-----------( 241      ( 07 Feb 2025 08:11 )             45.8     02-07    139  |  113[H]  |  10  ----------------------------<  158[H]  4.2   |  23  |  0.61    Ca    8.4[L]      07 Feb 2025 08:11    TPro  7.1  /  Alb  3.6  /  TBili  0.5  /  DBili  x   /  AST  31  /  ALT  31  /  AlkPhos  99  02-06        LIVER FUNCTIONS - ( 06 Feb 2025 00:03 )  Alb: 3.6 g/dL / Pro: 7.1 gm/dL / ALK PHOS: 99 U/L / ALT: 31 U/L / AST: 31 U/L / GGT: x           PT/INR - ( 06 Feb 2025 00:03 )   PT: 11.2 sec;   INR: 0.97 ratio         PTT - ( 06 Feb 2025 00:03 )  PTT:37.0 sec    Urinalysis Basic - ( 07 Feb 2025 08:11 )    Color: x / Appearance: x / SG: x / pH: x  Gluc: 158 mg/dL / Ketone: x  / Bili: x / Urobili: x   Blood: x / Protein: x / Nitrite: x   Leuk Esterase: x / RBC: x / WBC x   Sq Epi: x / Non Sq Epi: x / Bacteria: x        Lactate, Blood: 0.8 mmol/L (02-07 @ 08:11)    Blood, Urine: Negative (02-06 @ 15:18)    Troponin Trend: Lactate, Blood: 0.8 mmol/L (02-07 @ 08:11)    A/P:  SBO  Pt did not tolerate clears  RLQ pain unchanged   LR bolus  repeat CT ordered, F/U results   Above as discussed with Dr Villanueva

## 2025-02-08 DIAGNOSIS — K56.609 UNSPECIFIED INTESTINAL OBSTRUCTION, UNSPECIFIED AS TO PARTIAL VERSUS COMPLETE OBSTRUCTION: ICD-10-CM

## 2025-02-08 LAB
ANION GAP SERPL CALC-SCNC: 3 MMOL/L — LOW (ref 5–17)
BUN SERPL-MCNC: 12 MG/DL — SIGNIFICANT CHANGE UP (ref 7–23)
CALCIUM SERPL-MCNC: 7.9 MG/DL — LOW (ref 8.5–10.1)
CHLORIDE SERPL-SCNC: 113 MMOL/L — HIGH (ref 96–108)
CO2 SERPL-SCNC: 24 MMOL/L — SIGNIFICANT CHANGE UP (ref 22–31)
CREAT SERPL-MCNC: 0.78 MG/DL — SIGNIFICANT CHANGE UP (ref 0.5–1.3)
EGFR: 80 ML/MIN/1.73M2 — SIGNIFICANT CHANGE UP
GLUCOSE SERPL-MCNC: 106 MG/DL — HIGH (ref 70–99)
HCT VFR BLD CALC: 38.9 % — SIGNIFICANT CHANGE UP (ref 34.5–45)
HGB BLD-MCNC: 12.5 G/DL — SIGNIFICANT CHANGE UP (ref 11.5–15.5)
MAGNESIUM SERPL-MCNC: 2 MG/DL — SIGNIFICANT CHANGE UP (ref 1.6–2.6)
MCHC RBC-ENTMCNC: 30.6 PG — SIGNIFICANT CHANGE UP (ref 27–34)
MCHC RBC-ENTMCNC: 32.1 G/DL — SIGNIFICANT CHANGE UP (ref 32–36)
MCV RBC AUTO: 95.1 FL — SIGNIFICANT CHANGE UP (ref 80–100)
MRSA PCR RESULT.: SIGNIFICANT CHANGE UP
PHOSPHATE SERPL-MCNC: 2.3 MG/DL — LOW (ref 2.5–4.5)
PLATELET # BLD AUTO: 213 K/UL — SIGNIFICANT CHANGE UP (ref 150–400)
POTASSIUM SERPL-MCNC: 4.2 MMOL/L — SIGNIFICANT CHANGE UP (ref 3.5–5.3)
POTASSIUM SERPL-SCNC: 4.2 MMOL/L — SIGNIFICANT CHANGE UP (ref 3.5–5.3)
RBC # BLD: 4.09 M/UL — SIGNIFICANT CHANGE UP (ref 3.8–5.2)
RBC # FLD: 13.9 % — SIGNIFICANT CHANGE UP (ref 10.3–14.5)
S AUREUS DNA NOSE QL NAA+PROBE: DETECTED
SODIUM SERPL-SCNC: 140 MMOL/L — SIGNIFICANT CHANGE UP (ref 135–145)
WBC # BLD: 8.31 K/UL — SIGNIFICANT CHANGE UP (ref 3.8–10.5)
WBC # FLD AUTO: 8.31 K/UL — SIGNIFICANT CHANGE UP (ref 3.8–10.5)

## 2025-02-08 PROCEDURE — 99232 SBSQ HOSP IP/OBS MODERATE 35: CPT

## 2025-02-08 PROCEDURE — 71045 X-RAY EXAM CHEST 1 VIEW: CPT | Mod: 26

## 2025-02-08 RX ORDER — SOD PHOSPHATE,MONOBASIC-DIBAS 3MMOL/ML
15 VIAL (ML) INTRAVENOUS ONCE
Refills: 0 | Status: COMPLETED | OUTPATIENT
Start: 2025-02-08 | End: 2025-02-08

## 2025-02-08 RX ADMIN — HYDROMORPHONE HYDROCHLORIDE 0.5 MILLIGRAM(S): 4 INJECTION, SOLUTION INTRAMUSCULAR; INTRAVENOUS; SUBCUTANEOUS at 06:22

## 2025-02-08 RX ADMIN — Medication 5000 UNIT(S): at 05:27

## 2025-02-08 RX ADMIN — HYDROMORPHONE HYDROCHLORIDE 0.5 MILLIGRAM(S): 4 INJECTION, SOLUTION INTRAMUSCULAR; INTRAVENOUS; SUBCUTANEOUS at 16:17

## 2025-02-08 RX ADMIN — Medication 5000 UNIT(S): at 15:19

## 2025-02-08 RX ADMIN — HYDROMORPHONE HYDROCHLORIDE 0.5 MILLIGRAM(S): 4 INJECTION, SOLUTION INTRAMUSCULAR; INTRAVENOUS; SUBCUTANEOUS at 16:02

## 2025-02-08 RX ADMIN — DEXTROSE MONOHYDRATE, SODIUM CHLORIDE, AND POTASSIUM CHLORIDE 100 MILLILITER(S): 50; 2.25; 2.24 INJECTION, SOLUTION INTRAVENOUS at 11:37

## 2025-02-08 RX ADMIN — HYDROMORPHONE HYDROCHLORIDE 0.5 MILLIGRAM(S): 4 INJECTION, SOLUTION INTRAMUSCULAR; INTRAVENOUS; SUBCUTANEOUS at 21:14

## 2025-02-08 RX ADMIN — HYDROMORPHONE HYDROCHLORIDE 0.5 MILLIGRAM(S): 4 INJECTION, SOLUTION INTRAMUSCULAR; INTRAVENOUS; SUBCUTANEOUS at 05:50

## 2025-02-08 RX ADMIN — LIDOCAINE HYDROCHLORIDE 1 PATCH: 30 CREAM TOPICAL at 05:31

## 2025-02-08 RX ADMIN — Medication 5000 UNIT(S): at 21:14

## 2025-02-08 RX ADMIN — HYDROMORPHONE HYDROCHLORIDE 0.5 MILLIGRAM(S): 4 INJECTION, SOLUTION INTRAMUSCULAR; INTRAVENOUS; SUBCUTANEOUS at 11:36

## 2025-02-08 RX ADMIN — LIDOCAINE HYDROCHLORIDE 1 PATCH: 30 CREAM TOPICAL at 07:21

## 2025-02-08 RX ADMIN — Medication 1 LOZENGE: at 02:12

## 2025-02-08 RX ADMIN — HYDROMORPHONE HYDROCHLORIDE 0.5 MILLIGRAM(S): 4 INJECTION, SOLUTION INTRAMUSCULAR; INTRAVENOUS; SUBCUTANEOUS at 21:29

## 2025-02-08 RX ADMIN — Medication 62.5 MILLIMOLE(S): at 11:36

## 2025-02-08 RX ADMIN — LIDOCAINE HYDROCHLORIDE 1 PATCH: 30 CREAM TOPICAL at 18:39

## 2025-02-08 RX ADMIN — HYDROMORPHONE HYDROCHLORIDE 0.5 MILLIGRAM(S): 4 INJECTION, SOLUTION INTRAMUSCULAR; INTRAVENOUS; SUBCUTANEOUS at 12:00

## 2025-02-08 NOTE — PROGRESS NOTE ADULT - SUBJECTIVE AND OBJECTIVE BOX
CC:    HPI:  74-year-old female w PMHx of Emphysema, Breast Ca s/p mastectomy, Melanoma, chronic back pain, Essential Tremors PSHx of  ex lap for Ovarian tumor resection, complaining of right lower quadrant abdominal pain for the past 1 evening.  Patient also reports some nausea.  Last bowel movement was earlier in the day.  Denies any fever.      2/8: No events over night.  S/P Lysis of adhesions for a SBO       PAST MEDICAL & SURGICAL HISTORY:  Emphysema/COPD      History of right mastectomy      S/P breast reconstruction      S/P CATALINO-BSO      History of ear surgery      Melanoma      S/P cataract surgery          FAMILY HISTORY:      Social Hx:    Allergies    No Known Allergies    Intolerances            ICU Vital Signs Last 24 Hrs  T(C): 36.6 (08 Feb 2025 06:06), Max: 36.8 (07 Feb 2025 13:10)  T(F): 97.8 (08 Feb 2025 06:06), Max: 98.3 (07 Feb 2025 15:16)  HR: 90 (08 Feb 2025 10:53) (72 - 112)  BP: 119/62 (08 Feb 2025 10:53) (96/55 - 186/109)  BP(mean): 78 (08 Feb 2025 10:53) (67 - 89)  ABP: --  ABP(mean): --  RR: 20 (08 Feb 2025 10:53) (10 - 20)  SpO2: 89% (08 Feb 2025 11:20) (81% - 97%)    O2 Parameters below as of 08 Feb 2025 11:20  Patient On (Oxygen Delivery Method): room air                I&O's Summary    07 Feb 2025 07:01  -  08 Feb 2025 07:00  --------------------------------------------------------  IN: 2455 mL / OUT: 725 mL / NET: 1730 mL                              12.5   8.31  )-----------( 213      ( 08 Feb 2025 06:14 )             38.9       02-08    140  |  113[H]  |  12  ----------------------------<  106[H]  4.2   |  24  |  0.78    Ca    7.9[L]      08 Feb 2025 06:14  Phos  2.3     02-08  Mg     2.0     02-08                  Urinalysis Basic - ( 08 Feb 2025 06:14 )    Color: x / Appearance: x / SG: x / pH: x  Gluc: 106 mg/dL / Ketone: x  / Bili: x / Urobili: x   Blood: x / Protein: x / Nitrite: x   Leuk Esterase: x / RBC: x / WBC x   Sq Epi: x / Non Sq Epi: x / Bacteria: x        MEDICATIONS  (STANDING):  BUpivacaine liposome 1.3% Injectable 20 milliLiter(s) Local Injection once  chlorhexidine 2% Cloths 1 Application(s) Topical <User Schedule>  dextrose 5% + sodium chloride 0.9% with potassium chloride 20 mEq/L 1000 milliLiter(s) (100 mL/Hr) IV Continuous <Continuous>  heparin   Injectable 5000 Unit(s) SubCutaneous every 8 hours  lidocaine   4% Patch 1 Patch Transdermal daily    MEDICATIONS  (PRN):  acetaminophen   IVPB .. 1000 milliGRAM(s) IV Intermittent every 6 hours PRN Temp greater or equal to 38C (100.4F), Mild Pain (1 - 3)  benzocaine/menthol Lozenge 1 Lozenge Oral three times a day PRN Sore Throat  HYDROmorphone  Injectable 0.5 milliGRAM(s) IV Push every 4 hours PRN Severe Pain (7 - 10)  ondansetron Injectable 4 milliGRAM(s) IV Push every 8 hours PRN Nausea and/or Vomiting      DVT Prophylaxis: [facility]    Advanced Directives:  Discussed with:    Visit Information:    ** Time is exclusive of billed procedures and/or teaching and/or routine family updates.

## 2025-02-08 NOTE — PROGRESS NOTE ADULT - PROBLEM SELECTOR PLAN 1
- NGT clamp trial, after 6hrs output<200 d/c NGT adv diet to sips/ice chips  - Pain control  - GI ppx  - DVT ppx  - Transfer to med/sx   Case d/w Dr. Musa

## 2025-02-08 NOTE — INPATIENT CERTIFICATION FOR MEDICARE PATIENTS - NS ICMP CERT SIG IND
"Oncology Rooming Note    March 5, 2019 11:09 AM   Lilibeth Pena is a 64 year old female who presents for:    Chief Complaint   Patient presents with     Oncology Clinic Visit     [facility]     Initial Vitals: /84   Pulse 70   Temp 98.4  F (36.9  C) (Oral)   Resp 16   Wt 68.4 kg (150 lb 12.7 oz)   LMP 05/08/1997   SpO2 96%   BMI 25.09 kg/m   Estimated body mass index is 25.09 kg/m  as calculated from the following:    Height as of 10/18/18: 1.651 m (5' 5\").    Weight as of this encounter: 68.4 kg (150 lb 12.7 oz). Body surface area is 1.77 meters squared.  No Pain (0) Comment: Data Unavailable   Patient's last menstrual period was 05/08/1997.  Allergies reviewed: Yes  Medications reviewed: Yes    Medications: Medication refills not needed today.  Pharmacy name entered into [facility]:    [address] [facility] [address] [place] [address]    Clinical concerns: X ray, lab results  Petrona was notified.      Esther Rodriguez MA              "
I certify as stated above.

## 2025-02-08 NOTE — PROGRESS NOTE ADULT - SUBJECTIVE AND OBJECTIVE BOX
Post Op Day#: 1  Procedure:  Diagnostic laparoscopy with ANGELA    The patient is doing well without complaints, afebrile, having flatus, NGT: 225cc bilious overnight, hemodynamically stable.     Vital Signs Last 24 Hrs  T(C): 36.6 (08 Feb 2025 06:06), Max: 36.8 (07 Feb 2025 08:17)  T(F): 97.8 (08 Feb 2025 06:06), Max: 98.3 (07 Feb 2025 15:16)  HR: 75 (08 Feb 2025 08:00) (72 - 112)  BP: 102/60 (08 Feb 2025 08:00) (96/55 - 186/109)  BP(mean): 72 (08 Feb 2025 08:00) (67 - 89)  RR: 18 (08 Feb 2025 08:00) (10 - 19)  SpO2: 96% (08 Feb 2025 06:19) (81% - 97%)    Parameters below as of 08 Feb 2025 06:19  Patient On (Oxygen Delivery Method): nasal cannula  O2 Flow (L/min): 3      PHYSICAL EXAM:  General: NAD.  HEENT: no JVD, no jaundice.  LUNGS: CTAB.  Heart: S1 S2 RRR  Abd: soft, mild distention, appropriately tender    Wounds: clean dry and intact                          12.5   8.31  )-----------( 213      ( 08 Feb 2025 06:14 )             38.9       02-08    140  |  113[H]  |  12  ----------------------------<  106[H]  4.2   |  24  |  0.78    Ca    7.9[L]      08 Feb 2025 06:14  Phos  2.3     02-08  Mg     2.0     02-08

## 2025-02-09 LAB
ANION GAP SERPL CALC-SCNC: 3 MMOL/L — LOW (ref 5–17)
BUN SERPL-MCNC: 9 MG/DL — SIGNIFICANT CHANGE UP (ref 7–23)
CALCIUM SERPL-MCNC: 7.8 MG/DL — LOW (ref 8.5–10.1)
CHLORIDE SERPL-SCNC: 115 MMOL/L — HIGH (ref 96–108)
CO2 SERPL-SCNC: 25 MMOL/L — SIGNIFICANT CHANGE UP (ref 22–31)
CREAT SERPL-MCNC: 0.48 MG/DL — LOW (ref 0.5–1.3)
EGFR: 100 ML/MIN/1.73M2 — SIGNIFICANT CHANGE UP
GLUCOSE SERPL-MCNC: 112 MG/DL — HIGH (ref 70–99)
HCT VFR BLD CALC: 35.2 % — SIGNIFICANT CHANGE UP (ref 34.5–45)
HGB BLD-MCNC: 11.3 G/DL — LOW (ref 11.5–15.5)
MAGNESIUM SERPL-MCNC: 2.2 MG/DL — SIGNIFICANT CHANGE UP (ref 1.6–2.6)
MCHC RBC-ENTMCNC: 30.2 PG — SIGNIFICANT CHANGE UP (ref 27–34)
MCHC RBC-ENTMCNC: 32.1 G/DL — SIGNIFICANT CHANGE UP (ref 32–36)
MCV RBC AUTO: 94.1 FL — SIGNIFICANT CHANGE UP (ref 80–100)
PHOSPHATE SERPL-MCNC: 1.7 MG/DL — LOW (ref 2.5–4.5)
PLATELET # BLD AUTO: 179 K/UL — SIGNIFICANT CHANGE UP (ref 150–400)
POTASSIUM SERPL-MCNC: 4 MMOL/L — SIGNIFICANT CHANGE UP (ref 3.5–5.3)
POTASSIUM SERPL-SCNC: 4 MMOL/L — SIGNIFICANT CHANGE UP (ref 3.5–5.3)
RBC # BLD: 3.74 M/UL — LOW (ref 3.8–5.2)
RBC # FLD: 14 % — SIGNIFICANT CHANGE UP (ref 10.3–14.5)
SODIUM SERPL-SCNC: 143 MMOL/L — SIGNIFICANT CHANGE UP (ref 135–145)
WBC # BLD: 5.64 K/UL — SIGNIFICANT CHANGE UP (ref 3.8–10.5)
WBC # FLD AUTO: 5.64 K/UL — SIGNIFICANT CHANGE UP (ref 3.8–10.5)

## 2025-02-09 RX ADMIN — Medication 5000 UNIT(S): at 14:25

## 2025-02-09 RX ADMIN — HYDROMORPHONE HYDROCHLORIDE 0.5 MILLIGRAM(S): 4 INJECTION, SOLUTION INTRAMUSCULAR; INTRAVENOUS; SUBCUTANEOUS at 12:45

## 2025-02-09 RX ADMIN — HYDROMORPHONE HYDROCHLORIDE 0.5 MILLIGRAM(S): 4 INJECTION, SOLUTION INTRAMUSCULAR; INTRAVENOUS; SUBCUTANEOUS at 21:57

## 2025-02-09 RX ADMIN — Medication 5000 UNIT(S): at 21:42

## 2025-02-09 RX ADMIN — LIDOCAINE HYDROCHLORIDE 1 PATCH: 30 CREAM TOPICAL at 19:30

## 2025-02-09 RX ADMIN — LIDOCAINE HYDROCHLORIDE 1 PATCH: 30 CREAM TOPICAL at 12:34

## 2025-02-09 RX ADMIN — HYDROMORPHONE HYDROCHLORIDE 0.5 MILLIGRAM(S): 4 INJECTION, SOLUTION INTRAMUSCULAR; INTRAVENOUS; SUBCUTANEOUS at 01:29

## 2025-02-09 RX ADMIN — HYDROMORPHONE HYDROCHLORIDE 0.5 MILLIGRAM(S): 4 INJECTION, SOLUTION INTRAMUSCULAR; INTRAVENOUS; SUBCUTANEOUS at 21:42

## 2025-02-09 RX ADMIN — HYDROMORPHONE HYDROCHLORIDE 0.5 MILLIGRAM(S): 4 INJECTION, SOLUTION INTRAMUSCULAR; INTRAVENOUS; SUBCUTANEOUS at 16:54

## 2025-02-09 RX ADMIN — Medication 5000 UNIT(S): at 05:32

## 2025-02-09 RX ADMIN — HYDROMORPHONE HYDROCHLORIDE 0.5 MILLIGRAM(S): 4 INJECTION, SOLUTION INTRAMUSCULAR; INTRAVENOUS; SUBCUTANEOUS at 01:14

## 2025-02-09 RX ADMIN — HYDROMORPHONE HYDROCHLORIDE 0.5 MILLIGRAM(S): 4 INJECTION, SOLUTION INTRAMUSCULAR; INTRAVENOUS; SUBCUTANEOUS at 08:04

## 2025-02-09 NOTE — PROGRESS NOTE ADULT - SUBJECTIVE AND OBJECTIVE BOX
SURGERY DAILY PROGRESS NOTE:     Subjective:  Patient seen and examined at bedside during am rounds. AVSS. Post Op Day#: 2 s/p Diagnostic laparoscopy with ANGELA    The patient is doing well without complaints, afebrile, having flatus, NGT: removed after clamp trail, hemodynamically stable.  Tolerating clears     Objective:    MEDICATIONS  (STANDING):  BUpivacaine liposome 1.3% Injectable 20 milliLiter(s) Local Injection once  chlorhexidine 2% Cloths 1 Application(s) Topical <User Schedule>  dextrose 5% + sodium chloride 0.9% with potassium chloride 20 mEq/L 1000 milliLiter(s) (100 mL/Hr) IV Continuous <Continuous>  heparin   Injectable 5000 Unit(s) SubCutaneous every 8 hours  lidocaine   4% Patch 1 Patch Transdermal daily    MEDICATIONS  (PRN):  acetaminophen   IVPB .. 1000 milliGRAM(s) IV Intermittent every 6 hours PRN Temp greater or equal to 38C (100.4F), Mild Pain (1 - 3)  benzocaine/menthol Lozenge 1 Lozenge Oral three times a day PRN Sore Throat  HYDROmorphone  Injectable 0.5 milliGRAM(s) IV Push every 4 hours PRN Severe Pain (7 - 10)  ondansetron Injectable 4 milliGRAM(s) IV Push every 8 hours PRN Nausea and/or Vomiting      Vital Signs Last 24 Hrs  T(C): 37 (08 Feb 2025 23:56), Max: 37.2 (08 Feb 2025 20:00)  T(F): 98.6 (08 Feb 2025 23:56), Max: 99 (08 Feb 2025 20:00)  HR: 77 (08 Feb 2025 23:56) (74 - 90)  BP: 105/50 (08 Feb 2025 23:56) (102/60 - 119/62)  BP(mean): 63 (08 Feb 2025 23:56) (63 - 78)  RR: 17 (08 Feb 2025 23:56) (15 - 20)  SpO2: 95% (08 Feb 2025 23:56) (89% - 97%)    Parameters below as of 08 Feb 2025 23:56  Patient On (Oxygen Delivery Method): nasal cannula  O2 Flow (L/min): 3      PHYSICAL EXAM:  General: NAD.  HEENT: no JVD, no jaundice.  LUNGS: CTAB.  Heart: S1 S2 RRR  Abd: soft, mild distention, appropriately tender    Wounds: clean dry and intact      I&O's Detail    07 Feb 2025 07:01  -  08 Feb 2025 07:00  --------------------------------------------------------  IN:    dextrose 5% + sodium chloride 0.9% w/ Additives: 755 mL    IV PiggyBack: 100 mL    Lactated Ringers: 300 mL    Other (mL): 1300 mL  Total IN: 2455 mL    OUT:    Nasogastric/Oral tube (mL): 225 mL    Voided (mL): 500 mL  Total OUT: 725 mL    Total NET: 1730 mL      08 Feb 2025 07:01  -  09 Feb 2025 03:28  --------------------------------------------------------  IN:  Total IN: 0 mL    OUT:    Voided (mL): 50 mL  Total OUT: 50 mL    Total NET: -50 mL          Daily     Daily     LABS:                        12.5   8.31  )-----------( 213      ( 08 Feb 2025 06:14 )             38.9     02-08    140  |  113[H]  |  12  ----------------------------<  106[H]  4.2   |  24  |  0.78    Ca    7.9[L]      08 Feb 2025 06:14  Phos  2.3     02-08  Mg     2.0     02-08        Urinalysis Basic - ( 08 Feb 2025 06:14 )    Color: x / Appearance: x / SG: x / pH: x  Gluc: 106 mg/dL / Ketone: x  / Bili: x / Urobili: x   Blood: x / Protein: x / Nitrite: x   Leuk Esterase: x / RBC: x / WBC x   Sq Epi: x / Non Sq Epi: x / Bacteria: x        RADIOLOGY & ADDITIONAL STUDIES:    ASSESSMENT/PLAN:

## 2025-02-10 ENCOUNTER — TRANSCRIPTION ENCOUNTER (OUTPATIENT)
Age: 75
End: 2025-02-10

## 2025-02-10 VITALS
HEART RATE: 61 BPM | TEMPERATURE: 98 F | OXYGEN SATURATION: 96 % | DIASTOLIC BLOOD PRESSURE: 69 MMHG | RESPIRATION RATE: 16 BRPM | SYSTOLIC BLOOD PRESSURE: 136 MMHG

## 2025-02-10 LAB
ANION GAP SERPL CALC-SCNC: 2 MMOL/L — LOW (ref 5–17)
BASOPHILS # BLD AUTO: 0.02 K/UL — SIGNIFICANT CHANGE UP (ref 0–0.2)
BASOPHILS NFR BLD AUTO: 0.5 % — SIGNIFICANT CHANGE UP (ref 0–2)
BUN SERPL-MCNC: 4 MG/DL — LOW (ref 7–23)
CALCIUM SERPL-MCNC: 8.6 MG/DL — SIGNIFICANT CHANGE UP (ref 8.5–10.1)
CHLORIDE SERPL-SCNC: 112 MMOL/L — HIGH (ref 96–108)
CO2 SERPL-SCNC: 23 MMOL/L — SIGNIFICANT CHANGE UP (ref 22–31)
CREAT SERPL-MCNC: 0.54 MG/DL — SIGNIFICANT CHANGE UP (ref 0.5–1.3)
EGFR: 97 ML/MIN/1.73M2 — SIGNIFICANT CHANGE UP
EOSINOPHIL # BLD AUTO: 0.21 K/UL — SIGNIFICANT CHANGE UP (ref 0–0.5)
EOSINOPHIL NFR BLD AUTO: 4.8 % — SIGNIFICANT CHANGE UP (ref 0–6)
GLUCOSE SERPL-MCNC: 93 MG/DL — SIGNIFICANT CHANGE UP (ref 70–99)
HCT VFR BLD CALC: 40.2 % — SIGNIFICANT CHANGE UP (ref 34.5–45)
HGB BLD-MCNC: 13 G/DL — SIGNIFICANT CHANGE UP (ref 11.5–15.5)
IMM GRANULOCYTES NFR BLD AUTO: 0.2 % — SIGNIFICANT CHANGE UP (ref 0–0.9)
LYMPHOCYTES # BLD AUTO: 1.44 K/UL — SIGNIFICANT CHANGE UP (ref 1–3.3)
LYMPHOCYTES # BLD AUTO: 33 % — SIGNIFICANT CHANGE UP (ref 13–44)
MAGNESIUM SERPL-MCNC: 2.1 MG/DL — SIGNIFICANT CHANGE UP (ref 1.6–2.6)
MCHC RBC-ENTMCNC: 30.7 PG — SIGNIFICANT CHANGE UP (ref 27–34)
MCHC RBC-ENTMCNC: 32.3 G/DL — SIGNIFICANT CHANGE UP (ref 32–36)
MCV RBC AUTO: 95 FL — SIGNIFICANT CHANGE UP (ref 80–100)
MONOCYTES # BLD AUTO: 0.4 K/UL — SIGNIFICANT CHANGE UP (ref 0–0.9)
MONOCYTES NFR BLD AUTO: 9.2 % — SIGNIFICANT CHANGE UP (ref 2–14)
NEUTROPHILS # BLD AUTO: 2.28 K/UL — SIGNIFICANT CHANGE UP (ref 1.8–7.4)
NEUTROPHILS NFR BLD AUTO: 52.3 % — SIGNIFICANT CHANGE UP (ref 43–77)
PHOSPHATE SERPL-MCNC: 2.4 MG/DL — LOW (ref 2.5–4.5)
PLATELET # BLD AUTO: 178 K/UL — SIGNIFICANT CHANGE UP (ref 150–400)
POTASSIUM SERPL-MCNC: 4.5 MMOL/L — SIGNIFICANT CHANGE UP (ref 3.5–5.3)
POTASSIUM SERPL-SCNC: 4.5 MMOL/L — SIGNIFICANT CHANGE UP (ref 3.5–5.3)
RBC # BLD: 4.23 M/UL — SIGNIFICANT CHANGE UP (ref 3.8–5.2)
RBC # FLD: 13.9 % — SIGNIFICANT CHANGE UP (ref 10.3–14.5)
SODIUM SERPL-SCNC: 137 MMOL/L — SIGNIFICANT CHANGE UP (ref 135–145)
WBC # BLD: 4.36 K/UL — SIGNIFICANT CHANGE UP (ref 3.8–10.5)
WBC # FLD AUTO: 4.36 K/UL — SIGNIFICANT CHANGE UP (ref 3.8–10.5)

## 2025-02-10 PROCEDURE — 99232 SBSQ HOSP IP/OBS MODERATE 35: CPT

## 2025-02-10 RX ORDER — OXYCODONE HYDROCHLORIDE 30 MG/1
5 TABLET ORAL EVERY 6 HOURS
Refills: 0 | Status: DISCONTINUED | OUTPATIENT
Start: 2025-02-10 | End: 2025-02-10

## 2025-02-10 RX ORDER — ACETAMINOPHEN 160 MG/5ML
650 SUSPENSION ORAL EVERY 6 HOURS
Refills: 0 | Status: DISCONTINUED | OUTPATIENT
Start: 2025-02-10 | End: 2025-02-10

## 2025-02-10 RX ORDER — ONDANSETRON 4 MG/1
4 TABLET, ORALLY DISINTEGRATING ORAL EVERY 6 HOURS
Refills: 0 | Status: DISCONTINUED | OUTPATIENT
Start: 2025-02-10 | End: 2025-02-10

## 2025-02-10 RX ORDER — PANTOPRAZOLE 20 MG/1
40 TABLET, DELAYED RELEASE ORAL
Refills: 0 | Status: DISCONTINUED | OUTPATIENT
Start: 2025-02-10 | End: 2025-02-10

## 2025-02-10 RX ADMIN — Medication 5000 UNIT(S): at 05:40

## 2025-02-10 RX ADMIN — LIDOCAINE HYDROCHLORIDE 1 PATCH: 30 CREAM TOPICAL at 00:30

## 2025-02-10 RX ADMIN — HYDROMORPHONE HYDROCHLORIDE 0.5 MILLIGRAM(S): 4 INJECTION, SOLUTION INTRAMUSCULAR; INTRAVENOUS; SUBCUTANEOUS at 03:07

## 2025-02-10 RX ADMIN — HYDROMORPHONE HYDROCHLORIDE 0.5 MILLIGRAM(S): 4 INJECTION, SOLUTION INTRAMUSCULAR; INTRAVENOUS; SUBCUTANEOUS at 03:37

## 2025-02-10 RX ADMIN — OXYCODONE HYDROCHLORIDE 5 MILLIGRAM(S): 30 TABLET ORAL at 11:30

## 2025-02-10 RX ADMIN — OXYCODONE HYDROCHLORIDE 5 MILLIGRAM(S): 30 TABLET ORAL at 10:47

## 2025-02-10 NOTE — DISCHARGE NOTE NURSING/CASE MANAGEMENT/SOCIAL WORK - NSDCPEFALRISK_GEN_ALL_CORE
For information on Fall & Injury Prevention, visit: [email or web address] OR  [email or web address] OR  https://www.cdc.gov/steadi/patient.html

## 2025-02-10 NOTE — DISCHARGE NOTE PROVIDER - NSDCACTIVITY_GEN_ALL_CORE
Bathing allowed/Walking - Indoors allowed/No heavy lifting/straining/Walking - Outdoors allowed/Follow Instructions Provided by your Surgical Team/Activity as tolerated

## 2025-02-10 NOTE — DISCHARGE NOTE PROVIDER - CARE PROVIDER_API CALL
Karthik Villanueva  Surgery  [address] [place] [address], [address]  [place] [address]  Phone: [phone]  Fax: [phone]  Follow Up Time: 2 weeks

## 2025-02-10 NOTE — DISCHARGE NOTE PROVIDER - NSDCCPCAREPLAN_GEN_ALL_CORE_FT
PRINCIPAL DISCHARGE DIAGNOSIS  Diagnosis: Small bowel obstruction  Assessment and Plan of Treatment: s/p Diagnostic lap with ANGELA

## 2025-02-10 NOTE — DISCHARGE NOTE PROVIDER - NSDCMRMEDTOKEN_GEN_ALL_CORE_FT
Albuterol (Eqv-ProAir HFA) 90 mcg/inh inhalation aerosol: 2 puff(s) inhaled every 6 hours, As Needed  Anoro Ellipta 62.5 mcg-25 mcg/inh inhalation powder: 1 puff(s) inhaled once a day  Aspir 81 oral delayed release tablet: 1 tab(s) orally once a day  atorvastatin 10 mg oral tablet: 1 tab(s) orally once a day  cycloSPORINE 0.05% ophthalmic emulsion: 1 drop(s) in each affected eye 2 times a day  DULoxetine 60 mg oral delayed release capsule: 1 cap(s) orally once a day  gabapentin 300 mg oral capsule: 3 cap(s) orally once (at bedtime)  rOPINIRole 0.25 mg oral tablet: 3 tab(s) orally once a day (at bedtime)  traMADol 50 mg oral tablet: 1 tab(s) orally every 6 hours, As Needed  Vitamin D3 25 mcg (1000 intl units) oral tablet: 1 tab(s) orally once a day  zolpidem 10 mg oral tablet: 1 tab(s) orally once a day (at bedtime)

## 2025-02-10 NOTE — DISCHARGE NOTE PROVIDER - NSDCCPTREATMENT_GEN_ALL_CORE_FT
PRINCIPAL PROCEDURE  Procedure: Diagnostic laparoscopy  Findings and Treatment: lysis of adhesions, b/L TAP block

## 2025-02-10 NOTE — PHYSICAL THERAPY INITIAL EVALUATION ADULT - PERTINENT HX OF CURRENT PROBLEM, REHAB EVAL
74-year-old female w PMH of Emphysema, Breast Ca s/p mastectomy, Melanoma, chronic back pain, Essential Tremors PSH of  ex lap for Ovarian tumor resection, complaining of right lower quadrant abdominal pain found to have closed loop SBO.She is now  POD#4 s/p diagnostic lap with lysis of adhesions.     Interval events: Tolerating full liquid diet. Passing flatus. No nausea or vomiting. 74-year-old female w PMH of Emphysema, Breast Ca s/p mastectomy, Melanoma, chronic back pain, Essential Tremors PSH of  ex lap for Ovarian tumor resection, complaining of right lower quadrant abdominal pain found to have closed loop SBO. She is now  POD#4 s/p diagnostic lap with lysis of adhesions.     Interval events: Tolerating full liquid diet. Passing flatus. No nausea or vomiting.

## 2025-02-10 NOTE — DISCHARGE NOTE PROVIDER - NSDCFUSCHEDAPPT_GEN_ALL_CORE_FT
Liliana Lopez  [facility]  [facility] [address]  Scheduled Appointment: 02/24/2025

## 2025-02-10 NOTE — DISCHARGE NOTE PROVIDER - NSDCCAREPROVSEEN_GEN_ALL_CORE_FT
Juliette, Vanna Musa, Kristian Gonzalez, Brianna Villanueva, Karthik Skinner, Britany Viveros, Eduardo Vazquez, Christina Kwon, Flakito Fermin, Jaiden Nelson, Lori Gaspar, Luis Felipe Andre, Kimi Palmer, Jordana

## 2025-02-10 NOTE — PHYSICAL THERAPY INITIAL EVALUATION ADULT - GENERAL OBSERVATIONS, REHAB EVAL
Pt discharged from hospital before Initial Evaluation completed, ambulatory without assistance per PHIL Gold

## 2025-02-10 NOTE — PHYSICAL THERAPY INITIAL EVALUATION ADULT - DIAGNOSIS, PT EVAL
RLQ abdominal pain, +closed loop SBO s/p exp.lap & ANGELA 2/6/25, prior h/o ovarian CA s/p exploratory lap RLQ abdominal pain, +closed loop SBO s/p exp.lap & ANGELA 2/6/25, prior h/o ovarian CA s/p exploratory lap,CATALINO/BSO

## 2025-02-10 NOTE — PROGRESS NOTE ADULT - SUBJECTIVE AND OBJECTIVE BOX
74-year-old female w PMHx of Emphysema, Breast Ca s/p mastectomy, Melanoma, chronic back pain, Essential Tremors PSHx of  ex lap for Ovarian tumor resection, complaining of right lower quadrant abdominal pain and was found to have closed loop SBO. Paitent is s/p diagnostic lap with ANGELA.     2/10- patient was seen and examined. VSS. tolerating diet. Passing flatulence. Denies CP or SOB.     Vital Signs Last 24 Hrs  T(C): 36.4 (10 Feb 2025 07:36), Max: 36.8 (09 Feb 2025 16:00)  T(F): 97.5 (10 Feb 2025 07:36), Max: 98.3 (09 Feb 2025 16:00)  HR: 61 (10 Feb 2025 07:36) (61 - 74)  BP: 136/69 (10 Feb 2025 07:36) (116/57 - 136/69)  BP(mean): 84 (10 Feb 2025 00:00) (70 - 84)  RR: 16 (10 Feb 2025 07:36) (16 - 18)  SpO2: 96% (10 Feb 2025 07:36) (95% - 97%)    Parameters below as of 10 Feb 2025 07:36  Patient On (Oxygen Delivery Method): nasal cannula  O2 Flow (L/min): 2.5      ROS:   All 10 systems reviewed and found to be negative with the exception of what has been described above.     PE:  Constitutional: NAD, laying in bed  HEENT: NC/AT  Back: no tenderness  Respiratory: respirations even and non labored, LCTA  Cardiovascular: S1S2 regular, no murmurs  Abdomen: soft, not tender, not distended, positive BS  Genitourinary: voiding  Musculoskeletal: no muscle tenderness, no joint swelling or tenderness  Extremities: no pedal edema   Neurological: no focal deficits     MEDICATIONS  (STANDING):  BUpivacaine liposome 1.3% Injectable 20 milliLiter(s) Local Injection once  chlorhexidine 2% Cloths 1 Application(s) Topical <User Schedule>  dextrose 5% + sodium chloride 0.9% with potassium chloride 20 mEq/L 1000 milliLiter(s) (100 mL/Hr) IV Continuous <Continuous>  heparin   Injectable 5000 Unit(s) SubCutaneous every 8 hours  lidocaine   4% Patch 1 Patch Transdermal daily  pantoprazole    Tablet 40 milliGRAM(s) Oral before breakfast    MEDICATIONS  (PRN):  acetaminophen     Tablet .. 650 milliGRAM(s) Oral every 6 hours PRN Mild Pain (1 - 3)  benzocaine/menthol Lozenge 1 Lozenge Oral three times a day PRN Sore Throat  ondansetron   Disintegrating Tablet 4 milliGRAM(s) Oral every 6 hours PRN Nausea  oxyCODONE    IR 5 milliGRAM(s) Oral every 6 hours PRN Severe Pain (7 - 10)      02-10    137  |  112[H]  |  4[L]  ----------------------------<  93  4.5   |  23  |  0.54    Ca    8.6      10 Feb 2025 08:28  Phos  2.4     02-10  Mg     2.1     02-10                          13.0   4.36  )-----------( 178      ( 10 Feb 2025 08:28 )             40.2       CT abdomen/pelvis    IMPRESSION:  Findings compatible with closed loop small bowel obstruction, with   worsening small boweldilatation compared with February 06, 2025.    Hypoenhancing segment of ileum suspicious for ischemic bowel.    Moderate volume ascites, increased compared to prior exam.

## 2025-02-10 NOTE — DISCHARGE NOTE PROVIDER - NSDCFUADDINST_GEN_ALL_CORE_FT
May shower and pat incisions dry. Do not submerge incisions in water for at least 4-6 weeks. No heavy lifting greater than 5lbs for 4-6 weeks. Follow up with Dr. Villanueva in the office in 1-2 weeks. If symptoms recur, such as vomiting, nausea, fever, chills, report to the ED.

## 2025-02-10 NOTE — PROGRESS NOTE ADULT - SUBJECTIVE AND OBJECTIVE BOX
HPI: 74-year-old female w PMHx of Emphysema, Breast Ca s/p mastectomy, Melanoma, chronic back pain, Essential Tremors PSHx of  ex lap for Ovarian tumor resection, complaining of right lower quadrant abdominal pain found to have closed loop SBO. POD#4 s/p diagnostic lap with lysis of adhesions.     Interval events: Tolerating full liquid diet. Passing flatus. No nausea or vomiting.     Vital Signs Last 24 Hrs  T(C): 36.4 (10 Feb 2025 07:36), Max: 36.8 (09 Feb 2025 16:00)  T(F): 97.5 (10 Feb 2025 07:36), Max: 98.3 (09 Feb 2025 16:00)  HR: 61 (10 Feb 2025 07:36) (61 - 74)  BP: 136/69 (10 Feb 2025 07:36) (116/57 - 136/69)  BP(mean): 84 (10 Feb 2025 00:00) (70 - 84)  RR: 16 (10 Feb 2025 07:36) (16 - 18)  SpO2: 96% (10 Feb 2025 07:36) (95% - 97%)    Parameters below as of 10 Feb 2025 07:36  Patient On (Oxygen Delivery Method): nasal cannula  O2 Flow (L/min): 2.5    MEDICATIONS  (STANDING):  BUpivacaine liposome 1.3% Injectable 20 milliLiter(s) Local Injection once  chlorhexidine 2% Cloths 1 Application(s) Topical <User Schedule>  dextrose 5% + sodium chloride 0.9% with potassium chloride 20 mEq/L 1000 milliLiter(s) (100 mL/Hr) IV Continuous <Continuous>  heparin   Injectable 5000 Unit(s) SubCutaneous every 8 hours  lidocaine   4% Patch 1 Patch Transdermal daily    CBC:            13.0   4.36  )-----------( 178      ( 02-10-25 @ 08:28 )             40.2     Chem:         ( 02-10-25 @ 08:28 )    137  |  112[H]  |  4[L]  ----------------------------<  93  4.5   |  23  |  0.54    Physical exam:   Awake, comfortable appearing   S1, S2   Lungs clear   Abdomen moderate distention, soft, nontender, +active bowel sounds   Skin warm and dry

## 2025-02-10 NOTE — DISCHARGE NOTE NURSING/CASE MANAGEMENT/SOCIAL WORK - FINANCIAL ASSISTANCE
[facility] provides services at a reduced cost to those who are determined to be eligible through [facility]’s financial assistance program. Information regarding [facility]’s financial assistance program can be found by going to [email or web address] or by calling [phone].

## 2025-02-10 NOTE — PROGRESS NOTE ADULT - ASSESSMENT
A/P: 75 yo F with closed loop SBO s/p diagnostic lap for ANGELA. Has return of bowel function.     - Advance diet to regular today  - If tolerates lunch home later today   - Ambulate   - SQH 
Patient is an 75 yo F s/p diagnostic laparoscopy with ANGELA        Problem/Plan - 2:  ·  Problem: SBO (small bowel obstruction).   ·  Plan:   - ADAT  - Pain control  - GI ppx  - DVT ppx  - Transfer to med/sx   
75 yo F with closed loop SBO s/p diagnostic lap for ANGELA.     - managment as per surger   - Advance diet to regular   - diet advanced to regulas  - VSS  - encourage IS   - Ambulate   - SQH       *hx of COPD  - no acute exacerbation  - c/w Albuterol   - on anoro/ellipta at home    *HLD- statin    Dispo as per surgery/ No medical contraindication for dc.       
Patient is an 75 yo F s/p diagnostic laparoscopy with ANGELA 
A/P: 74 Female who P/W an SBP and is S/P lysis of adhesions     Plan:  SICU  NPO  NGT Clamp trial  D5 NS 20 KCL  Pain control  SQH DVT Prophylaxis  Transfer to med surg  D/W Surgery

## 2025-02-10 NOTE — DISCHARGE NOTE PROVIDER - HOSPITAL COURSE
74-year-old female w PMHx of Emphysema, Breast Ca s/p mastectomy, Melanoma, chronic back pain, Essential Tremors PSHx of  ex lap for Ovarian tumor resection, complaining of right lower quadrant abdominal pain and was found to have closed loop SBO. [place] is s/p diagnostic lap with ANGELA, she is now POD#4. She has return of bowel function and her diet has been slowly advanced. At this time she is tolerating a regular diet without issue. Her pain is well controlled. She is stable for discharge home.

## 2025-02-10 NOTE — DISCHARGE NOTE NURSING/CASE MANAGEMENT/SOCIAL WORK - PATIENT PORTAL LINK FT
You can access the FollowMyHealth Patient Portal offered by [facility] by registering at the following website: [email or web address]. By joining [email or web address]’s FollowMyHealth portal, you will also be able to view your health information using other applications (apps) compatible with our system.

## 2025-02-11 ENCOUNTER — APPOINTMENT (OUTPATIENT)
Dept: INTERNAL MEDICINE | Facility: CLINIC | Age: 75
End: 2025-02-11

## 2025-02-11 ENCOUNTER — NON-APPOINTMENT (OUTPATIENT)
Age: 75
End: 2025-02-11

## 2025-02-11 LAB
CULTURE RESULTS: SIGNIFICANT CHANGE UP
CULTURE RESULTS: SIGNIFICANT CHANGE UP
SPECIMEN SOURCE: SIGNIFICANT CHANGE UP
SPECIMEN SOURCE: SIGNIFICANT CHANGE UP
SURGICAL PATHOLOGY STUDY: SIGNIFICANT CHANGE UP

## 2025-02-15 DIAGNOSIS — Z90.722 ACQUIRED ABSENCE OF OVARIES, BILATERAL: ICD-10-CM

## 2025-02-15 DIAGNOSIS — I95.9 HYPOTENSION, UNSPECIFIED: ICD-10-CM

## 2025-02-15 DIAGNOSIS — M54.9 DORSALGIA, UNSPECIFIED: ICD-10-CM

## 2025-02-15 DIAGNOSIS — Z85.3 PERSONAL HISTORY OF MALIGNANT NEOPLASM OF BREAST: ICD-10-CM

## 2025-02-15 DIAGNOSIS — K56.699 OTHER INTESTINAL OBSTRUCTION UNSPECIFIED AS TO PARTIAL VERSUS COMPLETE OBSTRUCTION: ICD-10-CM

## 2025-02-15 DIAGNOSIS — R18.8 OTHER ASCITES: ICD-10-CM

## 2025-02-15 DIAGNOSIS — G25.0 ESSENTIAL TREMOR: ICD-10-CM

## 2025-02-15 DIAGNOSIS — Z90.11 ACQUIRED ABSENCE OF RIGHT BREAST AND NIPPLE: ICD-10-CM

## 2025-02-15 DIAGNOSIS — K55.9 VASCULAR DISORDER OF INTESTINE, UNSPECIFIED: ICD-10-CM

## 2025-02-15 DIAGNOSIS — Z79.82 LONG TERM (CURRENT) USE OF ASPIRIN: ICD-10-CM

## 2025-02-15 DIAGNOSIS — G89.29 OTHER CHRONIC PAIN: ICD-10-CM

## 2025-02-15 DIAGNOSIS — Z85.820 PERSONAL HISTORY OF MALIGNANT MELANOMA OF SKIN: ICD-10-CM

## 2025-02-15 DIAGNOSIS — Z90.79 ACQUIRED ABSENCE OF OTHER GENITAL ORGAN(S): ICD-10-CM

## 2025-02-15 DIAGNOSIS — J43.9 EMPHYSEMA, UNSPECIFIED: ICD-10-CM

## 2025-02-15 DIAGNOSIS — K56.50 INTESTINAL ADHESIONS [BANDS], UNSPECIFIED AS TO PARTIAL VERSUS COMPLETE OBSTRUCTION: ICD-10-CM

## 2025-02-15 DIAGNOSIS — Z90.710 ACQUIRED ABSENCE OF BOTH CERVIX AND UTERUS: ICD-10-CM

## 2025-02-19 ENCOUNTER — RX RENEWAL (OUTPATIENT)
Age: 75
End: 2025-02-19

## 2025-02-19 ENCOUNTER — APPOINTMENT (OUTPATIENT)
Dept: INTERNAL MEDICINE | Facility: CLINIC | Age: 75
End: 2025-02-19
Payer: MEDICARE

## 2025-02-19 VITALS
OXYGEN SATURATION: 89 % | DIASTOLIC BLOOD PRESSURE: 60 MMHG | TEMPERATURE: 98 F | SYSTOLIC BLOOD PRESSURE: 122 MMHG | HEART RATE: 84 BPM | HEIGHT: 64 IN | WEIGHT: 115 LBS | BODY MASS INDEX: 19.63 KG/M2 | RESPIRATION RATE: 16 BRPM

## 2025-02-19 DIAGNOSIS — Z87.891 PERSONAL HISTORY OF NICOTINE DEPENDENCE: ICD-10-CM

## 2025-02-19 PROBLEM — R06.09 DYSPNEA ON EXERTION: Status: ACTIVE | Noted: 2025-02-19

## 2025-02-19 PROBLEM — J98.11 ATELECTASIS OF LEFT LUNG: Status: ACTIVE | Noted: 2025-02-19

## 2025-02-19 PROBLEM — J90 PLEURAL EFFUSION, BILATERAL: Status: ACTIVE | Noted: 2025-02-19

## 2025-02-19 PROCEDURE — G2211 COMPLEX E/M VISIT ADD ON: CPT

## 2025-02-19 PROCEDURE — 99215 OFFICE O/P EST HI 40 MIN: CPT

## 2025-02-24 ENCOUNTER — APPOINTMENT (OUTPATIENT)
Dept: INTERNAL MEDICINE | Facility: CLINIC | Age: 75
End: 2025-02-24
Payer: MEDICARE

## 2025-02-24 VITALS
BODY MASS INDEX: 19.63 KG/M2 | TEMPERATURE: 98.4 F | OXYGEN SATURATION: 95 % | WEIGHT: 115 LBS | HEIGHT: 64 IN | HEART RATE: 87 BPM | SYSTOLIC BLOOD PRESSURE: 90 MMHG | DIASTOLIC BLOOD PRESSURE: 50 MMHG

## 2025-02-24 DIAGNOSIS — R79.89 OTHER SPECIFIED ABNORMAL FINDINGS OF BLOOD CHEMISTRY: ICD-10-CM

## 2025-02-24 DIAGNOSIS — M54.12 RADICULOPATHY, CERVICAL REGION: ICD-10-CM

## 2025-02-24 DIAGNOSIS — Z00.00 ENCOUNTER FOR GENERAL ADULT MEDICAL EXAMINATION W/OUT ABNORMAL FINDINGS: ICD-10-CM

## 2025-02-24 DIAGNOSIS — G47.00 INSOMNIA, UNSPECIFIED: ICD-10-CM

## 2025-02-24 DIAGNOSIS — D47.2 MONOCLONAL GAMMOPATHY: ICD-10-CM

## 2025-02-24 DIAGNOSIS — G25.0 ESSENTIAL TREMOR: ICD-10-CM

## 2025-02-24 DIAGNOSIS — J98.11 ATELECTASIS: ICD-10-CM

## 2025-02-24 DIAGNOSIS — E55.9 VITAMIN D DEFICIENCY, UNSPECIFIED: ICD-10-CM

## 2025-02-24 DIAGNOSIS — E04.1 NONTOXIC SINGLE THYROID NODULE: ICD-10-CM

## 2025-02-24 DIAGNOSIS — J44.9 CHRONIC OBSTRUCTIVE PULMONARY DISEASE, UNSPECIFIED: ICD-10-CM

## 2025-02-24 DIAGNOSIS — C43.9 MALIGNANT MELANOMA OF SKIN, UNSPECIFIED: ICD-10-CM

## 2025-02-24 DIAGNOSIS — R06.09 OTHER FORMS OF DYSPNEA: ICD-10-CM

## 2025-02-24 DIAGNOSIS — F32.A DEPRESSION, UNSPECIFIED: ICD-10-CM

## 2025-02-24 DIAGNOSIS — R19.5 OTHER FECAL ABNORMALITIES: ICD-10-CM

## 2025-02-24 DIAGNOSIS — K63.5 POLYP OF COLON: ICD-10-CM

## 2025-02-24 DIAGNOSIS — J90 PLEURAL EFFUSION, NOT ELSEWHERE CLASSIFIED: ICD-10-CM

## 2025-02-24 DIAGNOSIS — M81.0 AGE-RELATED OSTEOPOROSIS W/OUT CURRENT PATHOLOGICAL FRACTURE: ICD-10-CM

## 2025-02-24 DIAGNOSIS — M48.02 SPINAL STENOSIS, CERVICAL REGION: ICD-10-CM

## 2025-02-24 DIAGNOSIS — E78.00 PURE HYPERCHOLESTEROLEMIA, UNSPECIFIED: ICD-10-CM

## 2025-02-24 DIAGNOSIS — I65.22 OCCLUSION AND STENOSIS OF LEFT CAROTID ARTERY: ICD-10-CM

## 2025-02-24 DIAGNOSIS — C50.919 MALIGNANT NEOPLASM OF UNSPECIFIED SITE OF UNSPECIFIED FEMALE BREAST: ICD-10-CM

## 2025-02-24 PROCEDURE — G0439: CPT

## 2025-02-24 PROCEDURE — 90677 PCV20 VACCINE IM: CPT

## 2025-02-24 PROCEDURE — G0009: CPT

## 2025-02-24 RX ORDER — DENOSUMAB 60 MG/ML
60 INJECTION SUBCUTANEOUS
Refills: 0 | Status: ACTIVE | COMMUNITY

## 2025-03-02 RX ORDER — FLUTICASONE FUROATE, UMECLIDINIUM BROMIDE AND VILANTEROL TRIFENATATE 200; 62.5; 25 UG/1; UG/1; UG/1
200-62.5-25 POWDER RESPIRATORY (INHALATION)
Qty: 1 | Refills: 5 | Status: ACTIVE | COMMUNITY
Start: 2025-03-02 | End: 1900-01-01

## 2025-03-03 ENCOUNTER — NON-APPOINTMENT (OUTPATIENT)
Age: 75
End: 2025-03-03

## 2025-03-12 ENCOUNTER — NON-APPOINTMENT (OUTPATIENT)
Age: 75
End: 2025-03-12

## 2025-03-12 ENCOUNTER — APPOINTMENT (OUTPATIENT)
Dept: CARDIOLOGY | Facility: CLINIC | Age: 75
End: 2025-03-12
Payer: MEDICARE

## 2025-03-12 VITALS
WEIGHT: 120 LBS | OXYGEN SATURATION: 94 % | HEART RATE: 81 BPM | BODY MASS INDEX: 20.49 KG/M2 | HEIGHT: 64 IN | SYSTOLIC BLOOD PRESSURE: 113 MMHG | DIASTOLIC BLOOD PRESSURE: 72 MMHG

## 2025-03-12 PROCEDURE — 93000 ELECTROCARDIOGRAM COMPLETE: CPT

## 2025-03-12 PROCEDURE — 99204 OFFICE O/P NEW MOD 45 MIN: CPT

## 2025-03-25 ENCOUNTER — RESULT REVIEW (OUTPATIENT)
Age: 75
End: 2025-03-25

## 2025-03-25 ENCOUNTER — OUTPATIENT (OUTPATIENT)
Dept: OUTPATIENT SERVICES | Facility: HOSPITAL | Age: 75
LOS: 1 days | End: 2025-03-25
Payer: MEDICARE

## 2025-03-25 DIAGNOSIS — Z90.710 ACQUIRED ABSENCE OF BOTH CERVIX AND UTERUS: Chronic | ICD-10-CM

## 2025-03-25 DIAGNOSIS — Z98.890 OTHER SPECIFIED POSTPROCEDURAL STATES: Chronic | ICD-10-CM

## 2025-03-25 DIAGNOSIS — Z98.49 CATARACT EXTRACTION STATUS, UNSPECIFIED EYE: Chronic | ICD-10-CM

## 2025-03-25 DIAGNOSIS — R06.09 OTHER FORMS OF DYSPNEA: ICD-10-CM

## 2025-03-25 DIAGNOSIS — Z90.11 ACQUIRED ABSENCE OF RIGHT BREAST AND NIPPLE: Chronic | ICD-10-CM

## 2025-03-25 DIAGNOSIS — C43.9 MALIGNANT MELANOMA OF SKIN, UNSPECIFIED: Chronic | ICD-10-CM

## 2025-03-25 PROCEDURE — 93018 CV STRESS TEST I&R ONLY: CPT

## 2025-03-25 PROCEDURE — A9500: CPT

## 2025-03-25 PROCEDURE — 78452 HT MUSCLE IMAGE SPECT MULT: CPT

## 2025-03-25 PROCEDURE — 93016 CV STRESS TEST SUPVJ ONLY: CPT

## 2025-03-25 PROCEDURE — 78452 HT MUSCLE IMAGE SPECT MULT: CPT | Mod: 26

## 2025-03-25 PROCEDURE — 93017 CV STRESS TEST TRACING ONLY: CPT

## 2025-03-26 ENCOUNTER — OUTPATIENT (OUTPATIENT)
Dept: OUTPATIENT SERVICES | Facility: HOSPITAL | Age: 75
LOS: 1 days | End: 2025-03-26
Payer: MEDICARE

## 2025-03-26 ENCOUNTER — RESULT REVIEW (OUTPATIENT)
Age: 75
End: 2025-03-26

## 2025-03-26 DIAGNOSIS — Z98.890 OTHER SPECIFIED POSTPROCEDURAL STATES: Chronic | ICD-10-CM

## 2025-03-26 DIAGNOSIS — R06.09 OTHER FORMS OF DYSPNEA: ICD-10-CM

## 2025-03-26 DIAGNOSIS — Z90.710 ACQUIRED ABSENCE OF BOTH CERVIX AND UTERUS: Chronic | ICD-10-CM

## 2025-03-26 DIAGNOSIS — C43.9 MALIGNANT MELANOMA OF SKIN, UNSPECIFIED: Chronic | ICD-10-CM

## 2025-03-26 DIAGNOSIS — Z98.49 CATARACT EXTRACTION STATUS, UNSPECIFIED EYE: Chronic | ICD-10-CM

## 2025-03-26 DIAGNOSIS — Z90.11 ACQUIRED ABSENCE OF RIGHT BREAST AND NIPPLE: Chronic | ICD-10-CM

## 2025-03-26 PROCEDURE — 93306 TTE W/DOPPLER COMPLETE: CPT | Mod: 26

## 2025-03-26 PROCEDURE — 93306 TTE W/DOPPLER COMPLETE: CPT

## 2025-03-27 DIAGNOSIS — R06.09 OTHER FORMS OF DYSPNEA: ICD-10-CM

## 2025-04-17 ENCOUNTER — APPOINTMENT (OUTPATIENT)
Dept: CARDIOLOGY | Facility: CLINIC | Age: 75
End: 2025-04-17

## 2025-04-17 VITALS
DIASTOLIC BLOOD PRESSURE: 70 MMHG | WEIGHT: 115 LBS | OXYGEN SATURATION: 95 % | BODY MASS INDEX: 19.63 KG/M2 | HEIGHT: 64 IN | HEART RATE: 79 BPM | SYSTOLIC BLOOD PRESSURE: 113 MMHG | TEMPERATURE: 97.9 F

## 2025-04-17 PROCEDURE — G2211 COMPLEX E/M VISIT ADD ON: CPT

## 2025-04-17 PROCEDURE — 99214 OFFICE O/P EST MOD 30 MIN: CPT

## 2025-04-28 ENCOUNTER — TRANSCRIPTION ENCOUNTER (OUTPATIENT)
Age: 75
End: 2025-04-28

## 2025-05-14 ENCOUNTER — APPOINTMENT (OUTPATIENT)
Dept: INTERNAL MEDICINE | Facility: CLINIC | Age: 75
End: 2025-05-14
Payer: MEDICARE

## 2025-05-14 VITALS
DIASTOLIC BLOOD PRESSURE: 72 MMHG | OXYGEN SATURATION: 95 % | BODY MASS INDEX: 20.14 KG/M2 | HEIGHT: 64 IN | SYSTOLIC BLOOD PRESSURE: 102 MMHG | WEIGHT: 118 LBS | HEART RATE: 71 BPM | TEMPERATURE: 97.7 F

## 2025-05-14 DIAGNOSIS — G47.00 INSOMNIA, UNSPECIFIED: ICD-10-CM

## 2025-05-14 DIAGNOSIS — E78.00 PURE HYPERCHOLESTEROLEMIA, UNSPECIFIED: ICD-10-CM

## 2025-05-14 DIAGNOSIS — I65.22 OCCLUSION AND STENOSIS OF LEFT CAROTID ARTERY: ICD-10-CM

## 2025-05-14 DIAGNOSIS — C50.919 MALIGNANT NEOPLASM OF UNSPECIFIED SITE OF UNSPECIFIED FEMALE BREAST: ICD-10-CM

## 2025-05-14 DIAGNOSIS — G25.81 RESTLESS LEGS SYNDROME: ICD-10-CM

## 2025-05-14 DIAGNOSIS — D47.2 MONOCLONAL GAMMOPATHY: ICD-10-CM

## 2025-05-14 DIAGNOSIS — J44.9 CHRONIC OBSTRUCTIVE PULMONARY DISEASE, UNSPECIFIED: ICD-10-CM

## 2025-05-14 DIAGNOSIS — M81.0 AGE-RELATED OSTEOPOROSIS W/OUT CURRENT PATHOLOGICAL FRACTURE: ICD-10-CM

## 2025-05-14 DIAGNOSIS — F32.A DEPRESSION, UNSPECIFIED: ICD-10-CM

## 2025-05-14 PROCEDURE — G2211 COMPLEX E/M VISIT ADD ON: CPT

## 2025-05-14 PROCEDURE — 99214 OFFICE O/P EST MOD 30 MIN: CPT

## 2025-06-04 ENCOUNTER — TRANSCRIPTION ENCOUNTER (OUTPATIENT)
Age: 75
End: 2025-06-04

## 2025-06-17 ENCOUNTER — APPOINTMENT (OUTPATIENT)
Dept: INTERNAL MEDICINE | Facility: CLINIC | Age: 75
End: 2025-06-17

## 2025-07-14 ENCOUNTER — TRANSCRIPTION ENCOUNTER (OUTPATIENT)
Age: 75
End: 2025-07-14

## 2025-08-11 ENCOUNTER — APPOINTMENT (OUTPATIENT)
Dept: INTERNAL MEDICINE | Facility: CLINIC | Age: 75
End: 2025-08-11
Payer: MEDICARE

## 2025-08-11 VITALS
HEIGHT: 64 IN | TEMPERATURE: 98 F | DIASTOLIC BLOOD PRESSURE: 69 MMHG | SYSTOLIC BLOOD PRESSURE: 108 MMHG | WEIGHT: 115 LBS | RESPIRATION RATE: 16 BRPM | OXYGEN SATURATION: 93 % | BODY MASS INDEX: 19.63 KG/M2 | HEART RATE: 78 BPM

## 2025-08-11 DIAGNOSIS — R06.09 OTHER FORMS OF DYSPNEA: ICD-10-CM

## 2025-08-11 DIAGNOSIS — J44.9 CHRONIC OBSTRUCTIVE PULMONARY DISEASE, UNSPECIFIED: ICD-10-CM

## 2025-08-11 DIAGNOSIS — Z87.891 PERSONAL HISTORY OF NICOTINE DEPENDENCE: ICD-10-CM

## 2025-08-11 PROCEDURE — 94727 GAS DIL/WSHOT DETER LNG VOL: CPT

## 2025-08-11 PROCEDURE — 94729 DIFFUSING CAPACITY: CPT

## 2025-08-11 PROCEDURE — 99214 OFFICE O/P EST MOD 30 MIN: CPT | Mod: 25

## 2025-08-11 PROCEDURE — ZZZZZ: CPT

## 2025-08-11 PROCEDURE — 94060 EVALUATION OF WHEEZING: CPT

## 2025-08-19 ENCOUNTER — APPOINTMENT (OUTPATIENT)
Dept: INTERNAL MEDICINE | Facility: CLINIC | Age: 75
End: 2025-08-19
Payer: MEDICARE

## 2025-08-19 VITALS
TEMPERATURE: 98.5 F | SYSTOLIC BLOOD PRESSURE: 100 MMHG | HEART RATE: 76 BPM | OXYGEN SATURATION: 95 % | DIASTOLIC BLOOD PRESSURE: 58 MMHG | BODY MASS INDEX: 19.81 KG/M2 | WEIGHT: 116 LBS | HEIGHT: 64 IN

## 2025-08-19 DIAGNOSIS — I65.22 OCCLUSION AND STENOSIS OF LEFT CAROTID ARTERY: ICD-10-CM

## 2025-08-19 DIAGNOSIS — F32.A DEPRESSION, UNSPECIFIED: ICD-10-CM

## 2025-08-19 DIAGNOSIS — R79.89 OTHER SPECIFIED ABNORMAL FINDINGS OF BLOOD CHEMISTRY: ICD-10-CM

## 2025-08-19 DIAGNOSIS — G47.00 INSOMNIA, UNSPECIFIED: ICD-10-CM

## 2025-08-19 DIAGNOSIS — E78.00 PURE HYPERCHOLESTEROLEMIA, UNSPECIFIED: ICD-10-CM

## 2025-08-19 PROCEDURE — G2211 COMPLEX E/M VISIT ADD ON: CPT

## 2025-08-19 PROCEDURE — 99214 OFFICE O/P EST MOD 30 MIN: CPT

## 2025-09-02 ENCOUNTER — TRANSCRIPTION ENCOUNTER (OUTPATIENT)
Age: 75
End: 2025-09-02